# Patient Record
Sex: FEMALE | Race: WHITE | Employment: FULL TIME | URBAN - METROPOLITAN AREA
[De-identification: names, ages, dates, MRNs, and addresses within clinical notes are randomized per-mention and may not be internally consistent; named-entity substitution may affect disease eponyms.]

---

## 2017-01-03 ENCOUNTER — OFFICE VISIT (OUTPATIENT)
Dept: FAMILY MEDICINE | Facility: CLINIC | Age: 52
End: 2017-01-03
Payer: COMMERCIAL

## 2017-01-03 VITALS
TEMPERATURE: 97.9 F | HEIGHT: 66 IN | BODY MASS INDEX: 35.68 KG/M2 | DIASTOLIC BLOOD PRESSURE: 86 MMHG | RESPIRATION RATE: 16 BRPM | HEART RATE: 68 BPM | SYSTOLIC BLOOD PRESSURE: 135 MMHG | WEIGHT: 222 LBS

## 2017-01-03 DIAGNOSIS — M54.50 ACUTE LEFT-SIDED LOW BACK PAIN WITHOUT SCIATICA: Primary | ICD-10-CM

## 2017-01-03 PROCEDURE — 99214 OFFICE O/P EST MOD 30 MIN: CPT | Performed by: FAMILY MEDICINE

## 2017-01-03 RX ORDER — METHYLPREDNISOLONE 4 MG
TABLET, DOSE PACK ORAL
Qty: 21 TABLET | Refills: 0 | Status: SHIPPED | OUTPATIENT
Start: 2017-01-03 | End: 2018-02-20

## 2017-01-03 RX ORDER — NABUMETONE 500 MG/1
500-1000 TABLET, FILM COATED ORAL 2 TIMES DAILY PRN
Qty: 30 TABLET | Refills: 1 | Status: SHIPPED | OUTPATIENT
Start: 2017-01-03 | End: 2018-02-20

## 2017-01-03 NOTE — NURSING NOTE
"Chief Complaint   Patient presents with     Back Pain     lower left back pain x1 week, fell on ice today and now radiates down left leg     Initial /86 mmHg  Pulse 68  Temp(Src) 97.9  F (36.6  C) (Oral)  Resp 16  Ht 5' 5.5\" (1.664 m)  Wt 222 lb (100.699 kg)  BMI 36.37 kg/m2  LMP 09/09/2010  Breastfeeding? No Estimated body mass index is 36.37 kg/(m^2) as calculated from the following:    Height as of this encounter: 5' 5.5\" (1.664 m).    Weight as of this encounter: 222 lb (100.699 kg)..  BP completed using cuff size large.            Venessa Quigley/MARCIA    "

## 2017-01-03 NOTE — PROGRESS NOTES
SUBJECTIVE:                                                    Alison Barfield is a 51 year old female who presents to clinic today for the following health issues:      Back Pain      Duration: x1 week           Description:   Location of pain: low back left,  hip left  Character of pain: sharp  Pain radiation:radiates into the left knee cap after this morning's fall  New numbness or weakness in legs, not attributed to pain:  no     Intensity: Currently 7/10, moderate    History:   Pain interferes with job: yes  History of back problems: no prior back problems  Any previous MRI or X-rays: None  Sees a specialist for back pain:  No  Therapies tried without relief: acetaminophen (Tylenol), chiropractor, cold and heat    Alleviating factors:   Improved by: chiropractor      Precipitating factors:  Worsened by: Lifting, Bending, Standing, Sitting, Lying Flat and Walking    Functional and Psychosocial Screen (Trudy STarT Back):      Not performed today       Accompanying Signs & Symptoms:  Risk of Fracture:  None  Risk of Cauda Equina:  None  Risk of Infection:  None  Risk of Cancer:  None  Risk of Ankylosing Spondylitis:  Onset at age <35, male, AND morning back stiffness. no            Per patient pain initially started last Wednesday and was seen by Chiropractor on Friday for adjustment with helped some with her pain.   Unable to sleep because she can't roll on her side.   Slipped on ice this morning on her way to work which has made pain even worse.   Pain worse with being in one place for too long.   Has been using ice, ibuprofen/aleve, hot soaks without improvement.         Problem list and histories reviewed & adjusted, as indicated.  Additional history: as documented    Problem list, Medication list, Allergies, and Medical/Social/Surgical histories reviewed in EPIC and updated as appropriate.    ROS:  Constitutional, msk, neuro systems are negative, except as otherwise noted.    OBJECTIVE:                        "                             /86 mmHg  Pulse 68  Temp(Src) 97.9  F (36.6  C) (Oral)  Resp 16  Ht 5' 5.5\" (1.664 m)  Wt 222 lb (100.699 kg)  BMI 36.37 kg/m2  LMP 09/09/2010  Breastfeeding? No  Body mass index is 36.37 kg/(m^2).  GENERAL: healthy, alert and no distress  MS: back- no asymmetry, limited flexion and extension, left paralumbar muscles TTP, normal foot plantar flexion, negative pelvic compression   NEURO: sensory exam grossly normal, antalgic gait     Diagnostic Test Results:  none      ASSESSMENT/PLAN:                                                        1. Acute left-sided low back pain without sciatica  - will start on medrol dose pack to help with acute inflammation. Supportive care reviewed.   - methylPREDNISolone (MEDROL DOSEPAK) 4 MG tablet; Follow package instructions  Dispense: 21 tablet; Refill: 0  - tiZANidine (ZANAFLEX) 4 MG tablet; Take 1 tablet (4 mg) by mouth 3 times daily as needed for muscle spasms  Dispense: 60 tablet; Refill: 0  - nabumetone (RELAFEN) 500 MG tablet; Take 1-2 tablets (500-1,000 mg) by mouth 2 times daily as needed for moderate pain  Dispense: 30 tablet; Refill: 1  - [facility], [facility] CHIR[facility]RACT[facility] REFERRAL    See Patient Instructions    Iza Quinn MD  [facility]    "

## 2017-01-03 NOTE — PATIENT INSTRUCTIONS
Follow up in 2 weeks or sooner if needed  Back Contusion    You have a contusion to your back. A contusion is also called a bruise. There is swelling and some bleeding under the skin. The skin may be purplish. You may have muscle aching and stiffness in the area of the bruise. There are no broken bones.  Contusions heal on their own, without further treatment. However, pain and skin discoloration may take weeks to months to go away.   Home care    Rest. Avoid heavy lifting, strenuous exertion, or any activity that causes pain.    Ice the area to reduce pain and swelling. Put ice cubes in a plastic bag or use a cold pack. (Wrap the cold source in a thin towel. Do not place it directly on your skin.) Ice the injured area for 20 minutes every 1-2 hours the first day. Continue with ice packs 3-4 times a day for the next two days, then as needed for the relief of pain and swelling.    Take any prescribed pain medication. If none was prescribed, take acetaminophen, ibuprofen, or naproxen to control pain.  Follow-up care  Follow up with your healthcare provider, or as directed. Call if you are not better in 1-2 weeks.  When to seek medical advice  Call your healthcare provider for any of the following:    New or worsening pain    Increased swelling around the bruise    Pain spreads to one or both legs    Weakness or numbness in one or both legs     Loss of bowel or bladder control    Numbness in the groin or genital area    Fever of 100.4 F (38 C) or higher, or as directed by your healthcare provider    8190-2604 [employer]. [address] [place] [address], [place] [address]. All rights reserved. This information is not intended as a substitute for professional medical care. Always follow your healthcare professional's instructions.        Exercises To Strengthen Your Lower Back  Strong lower-back and abdominal muscles work together to support your spine. The exercises below will help strengthen the muscles of the  lower back. It is important that you begin exercising slowly and increase levels gradually.  Always begin any exercise program with stretching. If you feel pain while doing any of these exercises, stop and talk to your doctor about a more specific exercise program that better suits your condition.   Low back stretch  The point of stretching is to make you more flexible and increase your range of motion. Stretch only as much as you are able. Stretch slowly. Do not push your stretch to the limit. If at any point you feel pain while stretching, this is your (temporary) limit.    Lie on your back with your knees bent and both feet on the ground.    Slowly raise your left knee to your chest as you flatten your lower back against the floor. Hold for 5 seconds.    Relax and repeat the exercise with your right knee.    Do 10 of these exercises for each leg.    Repeat hugging both knees to your chest at the same time.  Building lower back strength  Start your exercise routine with 10 to 30 minutes a day, 1 to 3 times a day.  Initial exercises  Lying on your back:    Ankle pumps: Move your foot up and down, towards your head, and then away. Repeat 10 times with each foot.    Heel slides: Slowly bend your knee, drawing the heel of your foot towards you. Then slide your heel/foot from you, straightening your knee. Do not lift your foot off the floor (this is not a leg lift).    Abdominal contraction: Bend your knees and put your hands on your stomach. Tighten your stomach muscles. Hold for 5 seconds, then relax. Repeat 10 times.    Straight leg raise: Bend one leg at the knee and keep the other leg straight. Tighten your stomach muscles. Slowly lift your straight leg 6 to 12 inches off the floor and hold for up to 5 seconds. Repeat 10 times on each side.  Standing:    Wall squats: Stand with your back against the wall. Move your feet about 12 inches away from the wall. Tighten your stomach muscles, and slowly bend your knees  until they are at about a 45 degree angle. Do not go down too far. Hold about 5 seconds. Then slowly return to your starting position. Repeat 10 times.    Heel raises: Stand facing the wall. Slowly raise the heels of your feet up and down, while keeping your toes on the floor. If you have trouble balancing, you can touch the wall with your hands. Repeat 10 times.  More advanced exercises  When you feel comfortable enough, try these exercises.    Kneeling lumbar extension: Begin on your hands and knees. At the same time, raise and straighten your right arm and left leg until they are parallel to the ground. Hold for 2 seconds and come back slowly to a starting position. Repeat with left arm and right leg, alternating 10 times.    Prone lumbar extension: Lie face down, arms extended overhead, palms on the floor. At the same time, raise your right arm and left leg as high as comfortably possible. Hold for 10 seconds and slowly return to start. Repeat with left arm and right leg, alternating 10 times. Gradually build up to 20 times. (Advanced: Repeat this exercise raising both arms and both legs a few inches off the floor at the same time. Hold for 5 seconds and release.)    Pelvic tilt: Lie on the floor on your back with your knees bent at 90 degrees. Your feet should be flat on the floor. Inhale, exhale, then slowly contract your abdominal muscles bringing your navel toward your spine. Let your pelvis rock back until your lower back is flat on the floor. Hold for 10 seconds while breathing smoothly.    Abdominal crunch: Perform a pelvic tilt (above) flattening your lower back against the floor. Holding the tension in your abdominal muscles, take another breath and raise your shoulder blades off the ground (this is not a full sit-up). Keep your head in line with your body (don t bend your neck forward). Hold for 2 seconds, then slowly lower.    5560-2357 [employer]. [address] [place] [address], [place]  [address]. All rights reserved. This information is not intended as a substitute for professional medical care. Always follow your healthcare professional's instructions.        Back Care Tips    Caring for your back  These are things you can do to prevent a recurrence of acute back pain and to reduce symptoms from chronic back pain:    Maintain a healthy weight. If you are overweight, losing weight will help most types of back pain.    Exercise is an important part of recovery from most types of back pain. The back is supported by the muscles behind and in front of the spine. This means both the back muscles and the abdominal muscles must be strengthened to provide better support for your spine.     Swimming and brisk walking are good overall exercises to improve your fitness level.    Practice safe lifting methods (below).    Practice good posture when sitting, standing and walking. Avoid prolonged sitting. This puts more stress on the lower back than standing or walking.    Wear quality shoes with sufficient arch support. Foot and ankle alignment can affect back symptoms. Women should avoid high heels.    Therapeutic massage can help  relax the back muscles without stretching them.    During the first 24 to 72 hours after an acute injury or flare-up of chronic back pain, apply an ice pack to the painful area for 20 minutes and then remove it for 20 minutes over a period of 60 to 90 minutes or several times a day. As a safety precaution, do not use a heating pad at bedtime. Sleeping on a heating pad can lead to skin burns or tissue damage.    Ice and heat therapies can be alternated.  Medications  Talk to your doctor before using medications, especially if you have other medical problems or are taking other medicines.    You may use acetaminophen or ibuprofen to control pain, unless other pain medicine was prescribed. If you have chronic conditions like diabetes, liver or kidney disease, stomach ulcers or  gastrointestinal bleeding, or are taking blood thinners, talk with your doctor before taking any meidcations.    Be careful if you are given prescription pain medicines, narcotics, or medication for muscle spasm. They can cause drowsiness, affect your coordination, reflexes and judgment. Do not drive or operate heavy machinery.  Lumbar stretch  Here is a simple stretching exercise that will help relax muscle spasm and keep your back more limber. If exercise makes your back pain worse, don t do it.    Lie on your back with your knees bent and both feet on the ground.    Slowly raise your left knee to your chest as you flatten your lower back against the floor. Hold for 5 seconds.    Relax and repeat the exercise with your right knee.    Do 10 of these exercises for each leg.  Safe lifting method    Don t bend over at the waist to lift an object off the floor.  Instead, bend your knees and hips in a squat.     Keep your back and head upright    Hold the object close to your body, directly in front of you.    Straighten your legs to lift the object.     Lower the object to the floor in the reverse fashion.    If you must slide something across the floor, push it.  Posture tips  Sitting  Sit in chairs with straight backs or low-back support. rKeep your k nees lower than your hip, with your feet flat on the floor.  When driving, sit up straight. Adjust the seat forward so you are not leaning toward the steering wheel.  A small pillow or rolled towel behind your lower back may help if you are driving long distances.   Standing  When standing for long periods, shift most of your weight to one leg at a time. Alternate legs every few minutes.   Sleeping  The best way to sleep is on your side with your knees bent. Put a low pillow under your head to support your neck in a neutral spine position. Avoid thick pillows that bend your neck to one side. Put a pillow between your legs to further relax your lower back. If you sleep  on your back, put pillows under your knees to support your legs in a slightly flexed position. Use a firm mattress. If your mattress sags, replace it, or use a 1/2-inch plywood board under the mattress to add support.  Follow-up care  Follow up with your health care provider or as directed by our staff.  If X-rays, a CT scan or an MRI scan were taken, they will be reviewed by a radiologist. You will be notified of any new findings that may affect your care.  Call 911  Seek emergency medical care if any of the following occur:    Trouble breathing    Confusion    Very drowsy or trouble breathing    Fainting or loss of consciousness    Rapid or very slow heart rate    Loss of  bwel or bladder control  When to seek medical care  Call your health care provider if any of the following occur:    Pain becomes worse or spreads to your arms or legs    Weakness or numbness in one or both arms or legs    Numbness in the groin area    8011-4612 [employer]. [address] [place] [address], [place] [address]. All rights reserved. This information is not intended as a substitute for professional medical care. Always follow your healthcare professional's instructions.

## 2017-01-03 NOTE — MR AVS SNAPSHOT
After Visit Summary   1/3/2017    Alison Barfield    MRN: [id number]           Patient Information     Date Of Birth          1965        Visit Information        Provider Department      1/3/2017 3:45 PM Iza Quinn MD [facility]        Today's Diagnoses     Acute left-sided low back pain without sciatica    -  1       Care Instructions      Follow up in 2 weeks or sooner if needed  Back Contusion    You have a contusion to your back. A contusion is also called a bruise. There is swelling and some bleeding under the skin. The skin may be purplish. You may have muscle aching and stiffness in the area of the bruise. There are no broken bones.  Contusions heal on their own, without further treatment. However, pain and skin discoloration may take weeks to months to go away.   Home care    Rest. Avoid heavy lifting, strenuous exertion, or any activity that causes pain.    Ice the area to reduce pain and swelling. Put ice cubes in a plastic bag or use a cold pack. (Wrap the cold source in a thin towel. Do not place it directly on your skin.) Ice the injured area for 20 minutes every 1-2 hours the first day. Continue with ice packs 3-4 times a day for the next two days, then as needed for the relief of pain and swelling.    Take any prescribed pain medication. If none was prescribed, take acetaminophen, ibuprofen, or naproxen to control pain.  Follow-up care  Follow up with your healthcare provider, or as directed. Call if you are not better in 1-2 weeks.  When to seek medical advice  Call your healthcare provider for any of the following:    New or worsening pain    Increased swelling around the bruise    Pain spreads to one or both legs    Weakness or numbness in one or both legs     Loss of bowel or bladder control    Numbness in the groin or genital area    Fever of 100.4 F (38 C) or higher, or as directed by your healthcare provider    3751-6626 [employer]. [address] [place] [address], [place] [address]. All rights reserved. This information is not intended as a substitute for professional medical care. Always follow your healthcare professional's instructions.        Exercises To Strengthen Your Lower Back  Strong lower-back and abdominal muscles work together to support your spine. The exercises below will help strengthen the muscles of the lower back. It is important that you begin exercising slowly and increase levels gradually.  Always begin any exercise program with stretching. If you feel pain while doing any of these exercises, stop and talk to your doctor about a more specific exercise program that better suits your condition.   Low back stretch  The point of stretching is to make you more flexible and increase your range of motion. Stretch only as much as you are able. Stretch slowly. Do not push your stretch to the limit. If at any point you feel pain while stretching, this is your (temporary) limit.    Lie on your back with your knees bent and both feet on the ground.    Slowly raise your left knee to your chest as you flatten your lower back against the floor. Hold for 5 seconds.    Relax and repeat the exercise with your right knee.    Do 10 of these exercises for each leg.    Repeat hugging both knees to your chest at the same time.  Building lower back strength  Start your exercise routine with 10 to 30 minutes a day, 1 to 3 times a day.  Initial exercises  Lying on your back:    Ankle pumps: Move your foot up and down, towards your head, and then away. Repeat 10 times with each foot.    Heel slides: Slowly bend your knee, drawing the heel of your foot towards you. Then slide your heel/foot from you, straightening your knee. Do not lift your foot off the floor (this is not a leg lift).    Abdominal contraction: Bend your knees and put your hands on your stomach. Tighten your stomach muscles. Hold for 5 seconds, then relax. Repeat 10  times.    Straight leg raise: Bend one leg at the knee and keep the other leg straight. Tighten your stomach muscles. Slowly lift your straight leg 6 to 12 inches off the floor and hold for up to 5 seconds. Repeat 10 times on each side.  Standing:    Wall squats: Stand with your back against the wall. Move your feet about 12 inches away from the wall. Tighten your stomach muscles, and slowly bend your knees until they are at about a 45 degree angle. Do not go down too far. Hold about 5 seconds. Then slowly return to your starting position. Repeat 10 times.    Heel raises: Stand facing the wall. Slowly raise the heels of your feet up and down, while keeping your toes on the floor. If you have trouble balancing, you can touch the wall with your hands. Repeat 10 times.  More advanced exercises  When you feel comfortable enough, try these exercises.    Kneeling lumbar extension: Begin on your hands and knees. At the same time, raise and straighten your right arm and left leg until they are parallel to the ground. Hold for 2 seconds and come back slowly to a starting position. Repeat with left arm and right leg, alternating 10 times.    Prone lumbar extension: Lie face down, arms extended overhead, palms on the floor. At the same time, raise your right arm and left leg as high as comfortably possible. Hold for 10 seconds and slowly return to start. Repeat with left arm and right leg, alternating 10 times. Gradually build up to 20 times. (Advanced: Repeat this exercise raising both arms and both legs a few inches off the floor at the same time. Hold for 5 seconds and release.)    Pelvic tilt: Lie on the floor on your back with your knees bent at 90 degrees. Your feet should be flat on the floor. Inhale, exhale, then slowly contract your abdominal muscles bringing your navel toward your spine. Let your pelvis rock back until your lower back is flat on the floor. Hold for 10 seconds while breathing smoothly.    Abdominal  crunch: Perform a pelvic tilt (above) flattening your lower back against the floor. Holding the tension in your abdominal muscles, take another breath and raise your shoulder blades off the ground (this is not a full sit-up). Keep your head in line with your body (don t bend your neck forward). Hold for 2 seconds, then slowly lower.    3059-6483 [employer]. [address] [place] [address], [place] [address]. All rights reserved. This information is not intended as a substitute for professional medical care. Always follow your healthcare professional's instructions.        Back Care Tips    Caring for your back  These are things you can do to prevent a recurrence of acute back pain and to reduce symptoms from chronic back pain:    Maintain a healthy weight. If you are overweight, losing weight will help most types of back pain.    Exercise is an important part of recovery from most types of back pain. The back is supported by the muscles behind and in front of the spine. This means both the back muscles and the abdominal muscles must be strengthened to provide better support for your spine.     Swimming and brisk walking are good overall exercises to improve your fitness level.    Practice safe lifting methods (below).    Practice good posture when sitting, standing and walking. Avoid prolonged sitting. This puts more stress on the lower back than standing or walking.    Wear quality shoes with sufficient arch support. Foot and ankle alignment can affect back symptoms. Women should avoid high heels.    Therapeutic massage can help  relax the back muscles without stretching them.    During the first 24 to 72 hours after an acute injury or flare-up of chronic back pain, apply an ice pack to the painful area for 20 minutes and then remove it for 20 minutes over a period of 60 to 90 minutes or several times a day. As a safety precaution, do not use a heating pad at bedtime. Sleeping on a heating pad can lead to  skin burns or tissue damage.    Ice and heat therapies can be alternated.  Medications  Talk to your doctor before using medications, especially if you have other medical problems or are taking other medicines.    You may use acetaminophen or ibuprofen to control pain, unless other pain medicine was prescribed. If you have chronic conditions like diabetes, liver or kidney disease, stomach ulcers or gastrointestinal bleeding, or are taking blood thinners, talk with your doctor before taking any meidcations.    Be careful if you are given prescription pain medicines, narcotics, or medication for muscle spasm. They can cause drowsiness, affect your coordination, reflexes and judgment. Do not drive or operate heavy machinery.  Lumbar stretch  Here is a simple stretching exercise that will help relax muscle spasm and keep your back more limber. If exercise makes your back pain worse, don t do it.    Lie on your back with your knees bent and both feet on the ground.    Slowly raise your left knee to your chest as you flatten your lower back against the floor. Hold for 5 seconds.    Relax and repeat the exercise with your right knee.    Do 10 of these exercises for each leg.  Safe lifting method    Don t bend over at the waist to lift an object off the floor.  Instead, bend your knees and hips in a squat.     Keep your back and head upright    Hold the object close to your body, directly in front of you.    Straighten your legs to lift the object.     Lower the object to the floor in the reverse fashion.    If you must slide something across the floor, push it.  Posture tips  Sitting  Sit in chairs with straight backs or low-back support. rKeep your k nees lower than your hip, with your feet flat on the floor.  When driving, sit up straight. Adjust the seat forward so you are not leaning toward the steering wheel.  A small pillow or rolled towel behind your lower back may help if you are driving long  distances.   Standing  When standing for long periods, shift most of your weight to one leg at a time. Alternate legs every few minutes.   Sleeping  The best way to sleep is on your side with your knees bent. Put a low pillow under your head to support your neck in a neutral spine position. Avoid thick pillows that bend your neck to one side. Put a pillow between your legs to further relax your lower back. If you sleep on your back, put pillows under your knees to support your legs in a slightly flexed position. Use a firm mattress. If your mattress sags, replace it, or use a 1/2-inch plywood board under the mattress to add support.  Follow-up care  Follow up with your health care provider or as directed by our staff.  If X-rays, a CT scan or an MRI scan were taken, they will be reviewed by a radiologist. You will be notified of any new findings that may affect your care.  Call 911  Seek emergency medical care if any of the following occur:    Trouble breathing    Confusion    Very drowsy or trouble breathing    Fainting or loss of consciousness    Rapid or very slow heart rate    Loss of  bwel or bladder control  When to seek medical care  Call your health care provider if any of the following occur:    Pain becomes worse or spreads to your arms or legs    Weakness or numbness in one or both arms or legs    Numbness in the groin area    1366-3202 [employer]. [address] [place], [place] [address]. All rights reserved. This information is not intended as a substitute for professional medical care. Always follow your healthcare professional's instructions.              Follow-ups after your visit        Additional Services     [facility] PT, HAND, AND CHIROPRACTIC REFERRAL       **This order will print in the [facility] Scheduling Office**    Physical Therapy, Hand Therapy and Chiropractic Care are available through:    *[facility]  *[facility]  *[facility]    Call one number to schedule at any of the above locations: [phone].    Your provider has referred you to: Physical Therapy at [facility] or [facility]    Indication/Reason for Referral: Low Back Pain  Onset of Illness: a week ago and then fall today   Therapy Orders: Evaluate and Treat  Special Programs: None  Special Request: None    Trudy Yates      Additional Comments for the Therapist or Chiropractor:     Please be aware that coverage of these services is subject to the terms and limitations of your health insurance plan.  Call member services at your health plan with any benefit or coverage questions.      Please bring the following to your appointment:    *Your personal calendar for scheduling future appointments  *Comfortable clothing                  Who to contact     If you have questions or need follow up information about today's clinic visit or your schedule please contact [facility] directly at [phone].  Normal or non-critical lab and imaging results will be communicated to you by [employer], letter or phone within 4 business days after the clinic has received the results. If you do not hear from us within 7 days, please contact the clinic through [employer] or phone. If you have a critical or abnormal lab result, we will notify you by phone as soon as possible.  Submit refill requests through [employer] or call your pharmacy and they will forward the refill request to us. Please allow 3 business days for your refill to be completed.          Additional Information About Your Visit        [employer] Information     [employer] gives you secure access to your electronic health record. If you see a primary care provider, you can also send messages to your care team and make appointments. If you have questions, please call your primary care clinic.  If you do not have a primary care provider, please call [phone] and they will assist you.        Care EveryWhere ID     This is your Care  "EveryWhere ID. This could be used by other organizations to access your [facility] medical records  [id number]        Your Vitals Were     Pulse Temperature Respirations Height BMI (Body Mass Index) Last Period    68 97.9  F (36.6  C) (Oral) 16 5' 5.5\" (1.664 m) 36.37 kg/m2 09/09/2010    Breastfeeding?                   No            Blood Pressure from Last 3 Encounters:   01/03/17 135/86   11/18/16 114/66   11/07/16 114/78    Weight from Last 3 Encounters:   01/03/17 222 lb (100.699 kg)   11/18/16 221 lb (100.245 kg)   11/07/16 220 lb (99.791 kg)              We Performed the Following     [facility] PT, HAND, AND CH[facility]RACTIC REFERRAL          Today's Medication Changes          These changes are accurate as of: 1/3/17  4:28 PM.  If you have any questions, ask your nurse or doctor.               Start taking these medicines.        Dose/Directions    methylPREDNISolone 4 MG tablet   Commonly known as:  MEDROL DOSEPAK   Used for:  Acute left-sided low back pain without sciatica   Started by:  Iza Quinn MD        Follow package instructions   Quantity:  21 tablet   Refills:  0       nabumetone 500 MG tablet   Commonly known as:  RELAFEN   Used for:  Acute left-sided low back pain without sciatica   Started by:  Iza Quinn MD        Dose:  500-1000 mg   Take 1-2 tablets (500-1,000 mg) by mouth 2 times daily as needed for moderate pain   Quantity:  30 tablet   Refills:  1       tiZANidine 4 MG tablet   Commonly known as:  ZANAFLEX   Used for:  Acute left-sided low back pain without sciatica   Started by:  Iza Quinn MD        Dose:  4 mg   Take 1 tablet (4 mg) by mouth 3 times daily as needed for muscle spasms   Quantity:  60 tablet   Refills:  0            Where to get your medicines      These medications were sent to [facility] [address]  [address] [facility] [address]     Phone:  [phone]    - " methylPREDNISolone 4 MG tablet  - nabumetone 500 MG tablet  - tiZANidine 4 MG tablet             Primary Care Provider Office Phone # Fax #    Susan Rachele Haase, APRN [phone]       [facility] [address]  [facility] [address]        Thank you!     Thank you for choosing [facility]  for your care. Our goal is always to provide you with excellent care. Hearing back from our patients is one way we can continue to improve our services. Please take a few minutes to complete the written survey that you may receive in the mail after your visit with us. Thank you!             Your Updated Medication List - Protect others around you: Learn how to safely use, store and throw away your medicines at www.disposemymeds.org.          This list is accurate as of: 1/3/17  4:28 PM.  Always use your most recent med list.                   Brand Name Dispense Instructions for use    albuterol 108 (90 BASE) MCG/ACT Inhaler    PROAIR HFA/PROVENTIL HFA/VENTOLIN HFA    1 Inhaler    Inhale 2 puffs into the lungs every 4 hours as needed for shortness of breath / dyspnea or wheezing       atenolol 50 MG tablet    TENORMIN    90 tablet    Take 1 tablet (50 mg) by mouth daily       losartan-hydrochlorothiazide 100-25 MG per tablet    HYZAAR    90 tablet    Take 1 tablet by mouth daily       methylPREDNISolone 4 MG tablet    MEDROL DOSEPAK    21 tablet    Follow package instructions       nabumetone 500 MG tablet    RELAFEN    30 tablet    Take 1-2 tablets (500-1,000 mg) by mouth 2 times daily as needed for moderate pain       order for DME     1 each    Equipment being ordered: Blood pressure kit for home monitoring       SUMAtriptan 100 MG tablet    IMITREX    6 tablet    Take 1 tablet by mouth. May repeat in 2 hours if needed: max 2/day; average number of headaches monthly 1       tiZANidine 4 MG tablet    ZANAFLEX    60 tablet    Take 1 tablet (4 mg) by mouth 3 times daily as  needed for muscle spasms       TYLENOL PO      Take 1,000 mg by mouth as needed.

## 2017-01-25 DIAGNOSIS — Z20.828 EXPOSURE TO INFLUENZA: Primary | ICD-10-CM

## 2017-01-25 RX ORDER — OSELTAMIVIR PHOSPHATE 75 MG/1
75 CAPSULE ORAL DAILY
Qty: 10 CAPSULE | Refills: 0 | Status: SHIPPED | OUTPATIENT
Start: 2017-01-25 | End: 2018-02-20

## 2017-03-07 ENCOUNTER — TELEPHONE (OUTPATIENT)
Dept: FAMILY MEDICINE | Facility: CLINIC | Age: 52
End: 2017-03-07

## 2017-03-07 NOTE — TELEPHONE ENCOUNTER
Panel Management Review      Patient has the following on her problem list:     Depression / Dysthymia review  PHQ-9 SCORE 1/11/2016 7/1/2016 11/18/2016   Total Score - - -   Total Score 1 2 2      Patient is due for:  None    Hypertension   Last three blood pressure readings:  BP Readings from Last 3 Encounters:   01/03/17 135/86   11/18/16 114/66   11/07/16 114/78     Blood pressure: Passed    HTN Guidelines:  Age 18-59 BP range:  Less than 140/90  Age 60-85 with Diabetes:  Less than 140/90  Age 60-85 without Diabetes:  less than 150/90      Composite cancer screening  Chart review shows that this patient is due/due soon for the following Colonoscopy  Summary:    Patient is due/failing the following:   COLONOSCOPY    Action needed:   Patient needs referral/order: colonoscopy    Type of outreach:    Sent [employer] message.    Questions for provider review:    None                                                                                                                                    Lilibeth Johnston [facility]       Chart routed to Care Team .

## 2017-03-07 NOTE — LETTER
[facility]  [address] [facility] [id number] [phone]  March 21, 2017    Alison Barfield  [address] [facility] [address]    Dear Alison,    I care about your health and have reviewed your health plan. I have reviewed your medical conditions, medication list, and lab results and am making recommendations based on this review, to better manage your health.    You are in particular need of attention regarding:  -Colon Cancer Screening    I am recommending that you:  :-schedule a COLONOSCOPY to look for colon cancer (due every 10 years or 5 years in higher risk situations.)   Colon cancer is now the second leading cause of death in the [place] for both men and women and there are over 130,000 new cases and 50,000 deaths per year from colon cancer.  Colonoscopies can prevent 90-95% of these deaths.  Problem lesions can be removed before they ever become cancer.  This test is not only looking for cancer, but also getting rid of precancerious lesions.  If you do not wish to do a colonoscopy or cannot afford to do one, at this time, there is another option. It is called a FIT test or Fecal Immunochemical Occult Blood Test (take home stool sample kit).  It does not replace the colonoscopy for colorectal cancer screening, but it can detect hidden bleeding in the lower colon.  It does need to be repeated every year and if a positive result is obtained, you would be referred for a colonoscopy.  If you have completed either one of these tests at another facility, please have the records sent to our clinic so that we can best coordinate your care.    Here is a list of Health Maintenance topics that are due now or due soon:  Health Maintenance Due   Topic Date Due     HEPATITIS C SCREENING  09/30/1983     COLON CANCER SCREEN (SYSTEM ASSIGNED)  09/30/2015       Please call us at [phone] (or use [employer]) to address the above recommendations.      Thank you for trusting [facility] and we appreciate the opportunity to serve you.  We look forward to supporting your healthcare needs in the future.    Healthy Regards,    Susan Haase CNP

## 2017-04-01 ENCOUNTER — TELEPHONE (OUTPATIENT)
Dept: FAMILY MEDICINE | Facility: CLINIC | Age: 52
End: 2017-04-01

## 2017-04-01 NOTE — TELEPHONE ENCOUNTER
Pt calling her granddaughter testing positive for influenza A    She did take care of granddaughter this past wednesday.     Pt c/o of sore throat, body aches, and fever.      Advised as was treated in Jan prophylactic with Tamiflu would recommend testing this time to see if positive for influenza.      Pt to be seen at [facility]-  Will don mask when enters clinic.     Pt expressed understanding and acceptance of the plan.  Pt had no further questions at this time.  Advised can call back to clinic at any time with concerns.       Lilliam Schwarz RN

## 2017-04-03 DIAGNOSIS — I10 ESSENTIAL HYPERTENSION, BENIGN: ICD-10-CM

## 2017-04-03 DIAGNOSIS — J10.1 INFLUENZA B: Primary | ICD-10-CM

## 2017-04-03 RX ORDER — OSELTAMIVIR PHOSPHATE 75 MG/1
75 CAPSULE ORAL DAILY
Qty: 10 CAPSULE | Refills: 0 | Status: CANCELLED | OUTPATIENT
Start: 2017-04-03

## 2017-04-03 RX ORDER — OSELTAMIVIR PHOSPHATE 75 MG/1
75 CAPSULE ORAL 2 TIMES DAILY
Qty: 10 CAPSULE | Refills: 0 | Status: SHIPPED | OUTPATIENT
Start: 2017-04-03 | End: 2018-02-20

## 2017-04-03 NOTE — TELEPHONE ENCOUNTER
atenolol (TENORMIN) 50 MG tablet    Last Written Prescription Date: 06/10/16  Last Fill Quantity: 90, # refills: 2    Last Office Visit with FMJUDI, YEMI or [facility] prescribing provider:  01/03/17 with BAIRON and 11/18/16 with Soila Stahl Office Visit:        BP Readings from Last 3 Encounters:   01/03/17 135/86   11/18/16 114/66   11/07/16 114/78

## 2017-04-03 NOTE — TELEPHONE ENCOUNTER
Can you let Alison know that a prescription for Tamiflu was sent in, will prescribe the the dosage to treat.  Thanks,  Susan Haase, CNP

## 2017-04-03 NOTE — TELEPHONE ENCOUNTER
"Pt calls,  and grandchild has influenza B,  went to ER yesterday and diagnosed with influenza, told to call pcp for tamiflu, pt does have \"cold sxs-nasal congestion\" x 1 days, denies fever, cough or body aches, (t'd up both, select one) routed to , please advise, route to inform pt when sent on cell, may LM    Telephone Information:   Mobile [phone]     Jenae Silva RN, BSN  Message handled by Nurse Triage.    "

## 2017-04-04 RX ORDER — ATENOLOL 50 MG/1
50 TABLET ORAL DAILY
Qty: 90 TABLET | Refills: 1 | Status: SHIPPED | OUTPATIENT
Start: 2017-04-04 | End: 2017-07-31

## 2017-06-13 ENCOUNTER — TELEPHONE (OUTPATIENT)
Dept: FAMILY MEDICINE | Facility: CLINIC | Age: 52
End: 2017-06-13

## 2017-06-13 DIAGNOSIS — R91.8 PULMONARY NODULES: Primary | ICD-10-CM

## 2017-06-13 NOTE — TELEPHONE ENCOUNTER
Can you please call Alison and let her know it is time to have her CT of her chest repeated, the one in 1/2016 was abnormal and suggested follow up in 1 year to ensure the area is stable.  An order has been placed, please ask her to call radiology (give her the #) to set this up.  Thanks,  Susan Haase, CNP

## 2017-06-29 DIAGNOSIS — I10 ESSENTIAL HYPERTENSION, BENIGN: ICD-10-CM

## 2017-06-29 RX ORDER — LOSARTAN POTASSIUM AND HYDROCHLOROTHIAZIDE 25; 100 MG/1; MG/1
1 TABLET ORAL DAILY
Qty: 90 TABLET | Refills: 0 | Status: SHIPPED | OUTPATIENT
Start: 2017-06-29 | End: 2017-09-28

## 2017-06-29 NOTE — TELEPHONE ENCOUNTER
Losartan 100/25mg     Last Written Prescription Date: 06/03/2016  Last Fill Quantity: 90,04/02/2017 # refills: 3  Last Office Visit with G, P or [facility] prescribing provider: 01/03/2017-Dr Deysi Villagran       Potassium   Date Value Ref Range Status   11/18/2016 3.4 3.4 - 5.3 mmol/L Final     Creatinine   Date Value Ref Range Status   11/18/2016 0.65 0.52 - 1.04 mg/dL Final     BP Readings from Last 3 Encounters:   01/03/17 135/86   11/18/16 114/66   11/07/16 114/78

## 2017-06-29 NOTE — TELEPHONE ENCOUNTER
Prescription approved per [facility] Refill Protocol.  Jenae Silva RN, BSN

## 2017-07-31 ENCOUNTER — TELEPHONE (OUTPATIENT)
Dept: NURSING | Facility: CLINIC | Age: 52
End: 2017-07-31

## 2017-07-31 DIAGNOSIS — I10 ESSENTIAL HYPERTENSION WITH GOAL BLOOD PRESSURE LESS THAN 140/90: Primary | ICD-10-CM

## 2017-07-31 RX ORDER — METOPROLOL SUCCINATE 25 MG/1
25 TABLET, EXTENDED RELEASE ORAL DAILY
Qty: 90 TABLET | Refills: 1 | Status: SHIPPED | OUTPATIENT
Start: 2017-07-31 | End: 2018-02-12

## 2017-07-31 NOTE — TELEPHONE ENCOUNTER
Fax from [facility], ATENOLOL ON BACK ORDER, requesting rx change to metoprolol or another alternative, LM for pt to call, fax says last refill 7/2 for #90, back order temporary, confirm that pt needs rx replacement or if has supply    Jenae Silva RN, BSN  Message handled by Nurse Triage.

## 2017-07-31 NOTE — TELEPHONE ENCOUNTER
The last 3 times I went to pharmacy I have had to fight to get #30.   They would not dispense #90.   Has 4 days atenolol left on hand.   We checked with our pharmacy, they are out now too.   Please advise alternate for atenolol 50 mg one tablet daily?   We will call pt back with Soila's recommendation.   Gustabo Machado RN

## 2017-08-01 NOTE — TELEPHONE ENCOUNTER
left message informing below, call for certified letter returned, MARCIA Abdalla sent letter on 6/20/17, inform pt of CT scan f/u recommendations, routed to Lilibeth Stanley CMA        12:12 PM   Note      Certified letter sent to pt with scheduling information to inform pt         Melody 15, 2017     Jenae Silva RN

## 2017-08-01 NOTE — TELEPHONE ENCOUNTER
1. Pt informed and agrees to metoprolol as alternate for back ordered atenolol.   2. Pt informed and call transferred to  radiology scheduling  line for CT chest - this is follow up to 1/29/16 CT.    Message handled by Nurse Triage.  Gustabo Machado RN

## 2017-08-04 ENCOUNTER — HOSPITAL ENCOUNTER (OUTPATIENT)
Dept: CT IMAGING | Facility: CLINIC | Age: 52
Discharge: HOME | End: 2017-08-04
Attending: NURSE PRACTITIONER | Admitting: NURSE PRACTITIONER
Payer: COMMERCIAL

## 2017-08-04 DIAGNOSIS — R91.8 PULMONARY NODULES: ICD-10-CM

## 2017-08-04 PROCEDURE — 71250 CT THORAX DX C-: CPT

## 2017-08-05 DIAGNOSIS — R91.8 PULMONARY NODULES: Primary | ICD-10-CM

## 2017-08-06 NOTE — PROGRESS NOTES
Candelario Shane,  Your CT showed the following:    The two indeterminate right lung nodules are stable. No new or  enlarging lung lesions are appreciated. Lungs are otherwise clear.  additional 6 month interval follow-up to document one year of  stability.  I will place the order for you to have another CT in 6 months (2/2018).  Sincerely,     Susan Haase, CNP

## 2017-09-28 DIAGNOSIS — I10 ESSENTIAL HYPERTENSION, BENIGN: ICD-10-CM

## 2017-09-29 RX ORDER — LOSARTAN POTASSIUM AND HYDROCHLOROTHIAZIDE 25; 100 MG/1; MG/1
TABLET ORAL
Qty: 90 TABLET | Refills: 0 | Status: SHIPPED | OUTPATIENT
Start: 2017-09-29 | End: 2018-02-12

## 2017-09-29 NOTE — TELEPHONE ENCOUNTER
LOSARTAN/HCTZ 100/25MG TABLETS        Last Written Prescription Date: 06/29/17  Last Fill Quantity: 90, # refills: 0  Last Office Visit with G, P or [facility] prescribing provider: 11/18/16 Susan Haase       Potassium   Date Value Ref Range Status   11/18/2016 3.4 3.4 - 5.3 mmol/L Final     Creatinine   Date Value Ref Range Status   11/18/2016 0.65 0.52 - 1.04 mg/dL Final     BP Readings from Last 3 Encounters:   01/03/17 135/86   11/18/16 114/66   11/07/16 114/78

## 2017-09-29 NOTE — TELEPHONE ENCOUNTER
Medication is being filled for 1 time refill only due to:  Patient needs to be seen because due for annual physical with labs.     Prescription approved per [facility] Refill Protocol.    Wilma COLLIER RN, BSN, PHN  [place] Flex RN

## 2017-12-14 ENCOUNTER — TELEPHONE (OUTPATIENT)
Dept: FAMILY MEDICINE | Facility: CLINIC | Age: 52
End: 2017-12-14

## 2017-12-14 NOTE — TELEPHONE ENCOUNTER
Panel Management Review      Patient has the following on her problem list:     Depression / Dysthymia review    Measure:  Needs PHQ-9 score of 4 or less during index window.  Administer PHQ-9 and if score is 5 or more, send encounter to provider for next steps.    5 - 7 month window range:     PHQ-9 SCORE 1/11/2016 7/1/2016 11/18/2016   Total Score - - -   Total Score 1 2 2       If PHQ-9 recheck is 5 or more, route to provider for next steps.    Patient is due for:  PHQ9        Composite cancer screening  Chart review shows that this patient is due/due soon for the following Mammogram and Colonoscopy  Summary:    Patient is due/failing the following:   COLONOSCOPY and MAMMOGRAM    Action needed:   mammo and colonoscopy    Type of outreach:    Phone, left message for patient to call back.     Questions for provider review:    None                                                                                                                                    Venessa Quigley/MARCIA  [place]---[facility]       Chart routed to care team .

## 2018-02-12 DIAGNOSIS — I10 ESSENTIAL HYPERTENSION WITH GOAL BLOOD PRESSURE LESS THAN 140/90: ICD-10-CM

## 2018-02-12 DIAGNOSIS — I10 ESSENTIAL HYPERTENSION, BENIGN: ICD-10-CM

## 2018-02-12 NOTE — LETTER
[facility]  [address] [address] [id number] [phone]          February 15, 2018    Alison Barfield                                                                                                                     [address] [address] [address]            Dear Alison,    We recently received a call from your pharmacy requesting a refill of your medications.    A review of your chart indicates that an appointment is required.  Please contact our office at [phone] to schedule your ANNUAL FASTING doctor's appointment.    We have authorized one refill of your medication to allow time for you to schedule your appointment.    Taking care of your health is important to us and ongoing visits with your provider are vital to your care.  We look forward to seeing you in the near future.    Sincerely,    Susan Haase, CNP/Jenae GUDINO

## 2018-02-13 NOTE — TELEPHONE ENCOUNTER
"Requested Prescriptions   Pending Prescriptions Disp Refills     metoprolol succinate (TOPROL-XL) 25 MG 24 hr tablet [Pharmacy Med Name: METOPROLOL ER SUCCINATE 25MG TABS]  Last Written Prescription Date:  7/31/2017  Last Fill Quantity: 90 tablet,  # refills: 1   Last office visit: 1/3/2017 with prescribing provider:  Kai    90 tablet 0     Sig: TAKE 1 TABLET(25 MG) BY MOUTH DAILY    Beta-Blockers Protocol Failed    2/12/2018  6:41 PM       Failed - Blood pressure under 140/90    BP Readings from Last 3 Encounters:   01/03/17 135/86   11/18/16 114/66   11/07/16 114/78            Failed - Recent or future visit with authorizing provider's specialty    Patient had office visit in the last year or has a visit in the next 30 days with authorizing provider.  See \"Patient Info\" tab in inbasket, or \"Choose Columns\" in Meds & Orders section of the refill encounter.          Passed - Patient is age 6 or older   ________________________________________________________________________________________________________________________________________________________     losartan-hydrochlorothiazide (HYZAAR) 100-25 MG per tablet [Pharmacy Med Name: LOSARTAN/HCTZ 100/25MG TABLETS]  Last Written Prescription Date: 9/29/2017  Last Fill Quantity: 90 tablet,  # refills: 0   Last office visit: 1/3/2017 with prescribing provider:  Kai    90 tablet 0     Sig: TAKE 1 TABLET BY MOUTH DAILY    Angiotensin-II Receptors Failed    2/12/2018  6:41 PM       Failed - Blood pressure under 140/90 in past 12 months.    BP Readings from Last 3 Encounters:   01/03/17 135/86   11/18/16 114/66   11/07/16 114/78          Failed - Recent or future visit with authorizing provider's specialty    Patient had office visit in the last year or has a visit in the next 30 days with authorizing provider.  See \"Patient Info\" tab in inbasket, or \"Choose Columns\" in Meds & Orders section of the refill encounter.          Failed - Normal serum " creatinine on file in past 12 months    Recent Labs   Lab Test  11/18/16   1402   CR  0.65            Failed - Normal serum potassium on file in past 12 months    Recent Labs   Lab Test  11/18/16   1402   POTASSIUM  3.4          Passed - Patient is age 18 or older       Passed - No active pregnancy on record       Passed - No positive pregnancy test in past 12 months

## 2018-02-15 RX ORDER — METOPROLOL SUCCINATE 25 MG/1
TABLET, EXTENDED RELEASE ORAL
Qty: 30 TABLET | Refills: 0 | Status: SHIPPED | OUTPATIENT
Start: 2018-02-15 | End: 2018-05-02

## 2018-02-15 RX ORDER — LOSARTAN POTASSIUM AND HYDROCHLOROTHIAZIDE 25; 100 MG/1; MG/1
TABLET ORAL
Qty: 30 TABLET | Refills: 0 | Status: SHIPPED | OUTPATIENT
Start: 2018-02-15 | End: 2018-05-02

## 2018-02-15 NOTE — TELEPHONE ENCOUNTER
Needs visit, appointment letter sent, one month extended  Prescription approved per [facility] Refill Protocol.  Jenae Silva RN, BSN

## 2018-02-20 ENCOUNTER — OFFICE VISIT (OUTPATIENT)
Dept: FAMILY MEDICINE | Facility: CLINIC | Age: 53
End: 2018-02-20
Payer: COMMERCIAL

## 2018-02-20 VITALS
RESPIRATION RATE: 16 BRPM | DIASTOLIC BLOOD PRESSURE: 90 MMHG | HEIGHT: 66 IN | SYSTOLIC BLOOD PRESSURE: 150 MMHG | BODY MASS INDEX: 35.84 KG/M2 | HEART RATE: 84 BPM | TEMPERATURE: 98.7 F | WEIGHT: 223 LBS

## 2018-02-20 DIAGNOSIS — D05.10 DUCTAL CARCINOMA IN SITU (DCIS) OF BREAST, UNSPECIFIED LATERALITY: ICD-10-CM

## 2018-02-20 DIAGNOSIS — M54.42 ACUTE RIGHT-SIDED LOW BACK PAIN WITH LEFT-SIDED SCIATICA: Primary | ICD-10-CM

## 2018-02-20 PROCEDURE — 99213 OFFICE O/P EST LOW 20 MIN: CPT | Performed by: PHYSICIAN ASSISTANT

## 2018-02-20 RX ORDER — NABUMETONE 500 MG/1
500-1000 TABLET, FILM COATED ORAL 2 TIMES DAILY PRN
Qty: 60 TABLET | Refills: 1 | Status: SHIPPED | OUTPATIENT
Start: 2018-02-20 | End: 2018-06-06

## 2018-02-20 RX ORDER — CYCLOBENZAPRINE HCL 10 MG
5-10 TABLET ORAL 3 TIMES DAILY PRN
Qty: 30 TABLET | Refills: 1 | Status: SHIPPED | OUTPATIENT
Start: 2018-02-20 | End: 2018-06-06

## 2018-02-20 RX ORDER — METHYLPREDNISOLONE 4 MG
TABLET, DOSE PACK ORAL
Qty: 21 TABLET | Refills: 0 | Status: SHIPPED | OUTPATIENT
Start: 2018-02-20 | End: 2018-07-02

## 2018-02-20 NOTE — MR AVS SNAPSHOT
After Visit Summary   2/20/2018    Alison Barfield    MRN: [id number]           Patient Information     Date Of Birth          1965        Visit Information        Provider Department      2/20/2018 2:15 PM Antwan Wood PA-C [facility]        Today's Diagnoses     Acute right-sided low back pain with left-sided sciatica    -  1    Ductal carcinoma in situ (DCIS) of breast, unspecified laterality          Care Instructions      Exercises to Strengthen Your Lower Back  Strong lower back and abdominal muscles work together to support your spine. The exercises below will help strengthen the lower back. It is important that you begin exercising slowly and increase levels gradually.  Always begin any exercise program with stretching. If you feel pain while doing any of these exercises, stop and talk to your doctor about a more specific exercise program that better suits your condition.   Low back stretch  The point of stretching is to make you more flexible and increase your range of motion. Stretch only as much as you are able. Stretch slowly. Do not push your stretch to the limit. If at any point you feel pain while stretching, this is your (temporary) limit.    Lie on your back with your knees bent and both feet on the ground.    Slowly raise your left knee to your chest as you flatten your lower back against the floor. Hold for 5 seconds.    Relax and repeat the exercise with your right knee.    Do 10 of these exercises for each leg.    Repeat hugging both knees to your chest at the same time.  Building lower back strength  Start your exercise routine with 10 to 30 minutes a day, 1 to 3 times a day.  Initial exercises  Lying on your back:  1. Ankle pumps: Move your foot up and down, towards your head, and then away. Repeat 10 times with each foot.  2. Heel slides: Slowly bend your knee, drawing the heel of your foot towards you. Then slide your heel/foot from you,  straightening your knee. Do not lift your foot off the floor (this is not a leg lift).  3. Abdominal contraction: Bend your knees and put your hands on your stomach. Tighten your stomach muscles. Hold for 5 seconds, then relax. Repeat 10 times.  4. Straight leg raise: Bend one leg at the knee and keep the other leg straight. Tighten your stomach muscles. Slowly lift your straight leg 6 to 12 inches off the floor and hold for up to 5 seconds. Repeat 10 times on each side.  Standin. Wall squats: Stand with your back against the wall. Move your feet about 12 inches away from the wall. Tighten your stomach muscles, and slowly bend your knees until they are at about a 45 degree angle. Do not go down too far. Hold about 5 seconds. Then slowly return to your starting position. Repeat 10 times.  2. Heel raises: Stand facing the wall. Slowly raise the heels of your feet up and down, while keeping your toes on the floor. If you have trouble balancing, you can touch the wall with your hands. Repeat 10 times.  More advanced exercises  When you feel comfortable enough, try these exercises.  1. Kneeling lumbar extension: Begin on your hands and knees. At the same time, raise and straighten your right arm and left leg until they are parallel to the ground. Hold for 2 seconds and come back slowly to a starting position. Repeat with left arm and right leg, alternating 10 times.  2. Prone lumbar extension: Lie face down, arms extended overhead, palms on the floor. At the same time, raise your right arm and left leg as high as comfortably possible. Hold for 10 seconds and slowly return to start. Repeat with left arm and right leg, alternating 10 times. Gradually build up to 20 times. (Advanced: Repeat this exercise raising both arms and both legs a few inches off the floor at the same time. Hold for 5 seconds and release.)  3. Pelvic tilt: Lie on the floor on your back with your knees bent at 90 degrees. Your feet should be flat  "on the floor. Inhale, exhale, then slowly contract your abdominal muscles bringing your navel toward your spine. Let your pelvis rock back until your lower back is flat on the floor. Hold for 10 seconds while breathing smoothly.  4. Abdominal crunch: Perform a pelvic tilt (above) flattening your lower back against the floor. Holding the tension in your abdominal muscles, take another breath and raise your shoulder blades off the ground (this is not a full sit-up). Keep your head in line with your body (don t bend your neck forward). Hold for 2 seconds, then slowly lower.  Date Last Reviewed: 6/1/2016 2000-2017 [employer] [address] [place] [address], [place] [address]. All rights reserved. This information is not intended as a substitute for professional medical care. Always follow your healthcare professional's instructions.          * Sciatica    Sciatica (\"Lumbar Radiculopathy\") causes a pain that spreads from the lower back down into the buttock, hip and leg. Sometimes leg pain can occur without any back pain. Sciatica is due to irritation or pressure on a spinal nerve as it comes out of the spinal canal. This is most often due to a bulge or rupture of a nearby spinal disk (the cartilage cushion between each spinal bone), which presses on a nearby nerve. Other causes include spinal stenosis (narrowing of the spinal canal) and spasm of the piriformis muscle (a muscle in the buttocks that the sciatic nerve passes through).  Sciatica may begin after a sudden twisting/bending force (such as in a car accident), or sometimes after a simple awkward movement. In either case, muscle spasm is commonly present and contributes to the pain.  The diagnosis of sciatica is made from the symptoms and physical exam. Unless you had a physical injury (such as a car accident or fall), X-rays are usually not ordered for the initial evaluation of sciatica because the nerves and disks cannot be seen on an X-ray. Most " sciatica (80-90%) gets better with time.  What can I do about my low back pain?  There are three main things you can do to ease low back pain and help it go away.    Use heat or cold packs.    Take medicine as directed.    Use positions, movements and exercises. Stay active! Too much rest can make your symptoms worse.  Using heat or cold packs  Try cold packs or gentle heat to ease your pain. Use whichever gives the most relief. Apply the cold pack or heat for 15 minutes at a time, as often as needed.  Taking medicine  If taking over-the-counter medicine:    Take ibuprofen (Advil, Motrin) 600 mg. three times a day as needed for pain.  OR    Take Aleve (naproxen sodium) 220 to 440 mg. two times a day as needed for pain  If your doctor prescribed a muscle relaxant (cyclobenzapine 10 mg.):    Take one half ( ) to 1 tablet at bedtime    Do not drive when taking this medicine. This drug may make you sleepy.  Using positions, movements and exercises  Research tells us that moving your joints and muscles can help you recover from back pain. Such activity should be simple and gentle.  Use the positions below as well as walking to help relieve your discomfort. Try taking a short walk every 3 to 4 hours during the day. Walk for a few minutes inside your home or take longer walks outside, on a treadmill or at a mall. Slowly increase the amount of time you walk. Expect discomfort when you begin, but it should lessen as your back starts to recover.  Finding a position that is comfortable  When your back pain is new, you may find that certain positions will ease your pain. Gently try each of the following positions until you find one that eases your pain. Once you find a position of comfort, use it as often as you like while you recover. Return to your daily routine as soon as possible.     Lie on your back with your legs bent. You can do this by placing a pillow under your knees or lie on the floor and rest your lower legs on  the seat of a chair.    Lie on your side with your knees bent and place a pillow between your knees.    Lie on your stomach over pillows.  When should I call my doctor?  Your back pain should improve over the first couple of weeks. As it improves, you should be able to return to your normal activities. But call your doctor if:    You have a sudden change in your ability to control? your bladder or bowels.    You begin to feel tingling in your groin or legs.    The pain spreads down your leg and into your foot.    Your toes, feet or leg muscles begin to feel weak.    You feel generally unwell or sick.    Your pain gets worse.    8620-9362 [employer]. [address] [place] [address], [place] [address]. All rights reserved. This information is not intended as a substitute for professional medical care. Always follow your healthcare professional's instructions.  This information has been modified by your health care provider with permission from the publisher.                Follow-ups after your visit        Additional Services     ONC/HEME ADULT REFERRAL       Your provider has referred you to: [facility]: Cancer Care/Hematology (All Cancer Related Services) [place] [phone]   [email or web address]    Please be aware that coverage of these services is subject to the terms and limitations of your health insurance plan.  Call member services at your health plan with any benefit or coverage questions.      Please bring the following with you to your appointment:    (1) Any X-Rays, CTs or MRIs which have been performed.  Contact the facility where they were done to arrange for  prior to your scheduled appointment.   (2) List of current medications  (3) This referral request   (4) Any documents/labs given to you for this referral                  Who to contact     If you have questions or need follow up information about today's clinic visit or your schedule  "please contact [facility] directly at [phone].  Normal or non-critical lab and imaging results will be communicated to you by MyChart, letter or phone within 4 business days after the clinic has received the results. If you do not hear from us within 7 days, please contact the clinic through [employer] or phone. If you have a critical or abnormal lab result, we will notify you by phone as soon as possible.  Submit refill requests through [employer] or call your pharmacy and they will forward the refill request to us. Please allow 3 business days for your refill to be completed.          Additional Information About Your Visit        [employer] Information     [employer] gives you secure access to your electronic health record. If you see a primary care provider, you can also send messages to your care team and make appointments. If you have questions, please call your primary care clinic.  If you do not have a primary care provider, please call [phone] and they will assist you.        Care EveryWhere ID     This is your Care EveryWhere ID. This could be used by other organizations to access your [facility] medical records  [id number]        Your Vitals Were     Pulse Temperature Respirations Height Last Period BMI (Body Mass Index)    84 98.7  F (37.1  C) (Oral) 16 5' 5.5\" (1.664 m) 09/09/2010 36.54 kg/m2       Blood Pressure from Last 3 Encounters:   02/20/18 150/90   01/03/17 135/86   11/18/16 114/66    Weight from Last 3 Encounters:   02/20/18 223 lb (101.2 kg)   01/03/17 222 lb (100.7 kg)   11/18/16 221 lb (100.2 kg)              We Performed the Following     ONC/HEME ADULT REFERRAL          Today's Medication Changes          These changes are accurate as of 2/20/18  2:54 PM.  If you have any questions, ask your nurse or doctor.               Start taking these medicines.        Dose/Directions    cyclobenzaprine 10 MG tablet   Commonly known as:  FLEXERIL   Used for:  Acute right-sided low " back pain with left-sided sciatica   Started by:  Antwan Wood PA-C        Dose:  5-10 mg   Take 0.5-1 tablets (5-10 mg) by mouth 3 times daily as needed for muscle spasms   Quantity:  30 tablet   Refills:  1       nabumetone 500 MG tablet   Commonly known as:  RELAFEN   Used for:  Acute right-sided low back pain with left-sided sciatica   Started by:  Antwan Wood PA-C        Dose:  500-1000 mg   Take 1-2 tablets (500-1,000 mg) by mouth 2 times daily as needed for moderate pain   Quantity:  60 tablet   Refills:  1         Stop taking these medicines if you haven't already. Please contact your care team if you have questions.     oseltamivir 75 MG capsule   Commonly known as:  TAMIFLU   Stopped by:  Antwan Wood PA-C           tiZANidine 4 MG tablet   Commonly known as:  ZANAFLEX   Stopped by:  Antwan Wood PA-C                Where to get your medicines      These medications were sent to [facility] [id number] [place] [address]  [address] [facility] [address]     Phone:  [phone]     cyclobenzaprine 10 MG tablet    methylPREDNISolone 4 MG tablet    nabumetone 500 MG tablet                Primary Care Provider Office Phone # Fax #    Susan Rachele Haase, APRN -[phone] [address] [facility] [address]        Equal Access to Services     [facility] CenterHO[facility] : Hadii aad ku hadasho Soomaali, waaxda luqadaha, qaybta kaalmada adeegyada, darrian swanson haysangeeta mesa . So [facility] [phone].    ATENCIÓN: Si habla español, tiene a mehta disposición servicios gratuitos de asistencia lingüística. Siri al [phone].    We comply with applicable federal civil rights laws and [place] laws. We do not discriminate on the basis of race, color, national origin, age, disability, sex, sexual orientation, or gender identity.            Thank you!     Thank you for choosing [facility]  for your care.  Our goal is always to provide you with excellent care. Hearing back from our patients is one way we can continue to improve our services. Please take a few minutes to complete the written survey that you may receive in the mail after your visit with us. Thank you!             Your Updated Medication List - Protect others around you: Learn how to safely use, store and throw away your medicines at www.disposemymeds.org.          This list is accurate as of 2/20/18  2:54 PM.  Always use your most recent med list.                   Brand Name Dispense Instructions for use Diagnosis    albuterol 108 (90 BASE) MCG/ACT Inhaler    PROAIR HFA/PROVENTIL HFA/VENTOLIN HFA    1 Inhaler    Inhale 2 puffs into the lungs every 4 hours as needed for shortness of breath / dyspnea or wheezing    CAP (community acquired pneumonia)       cyclobenzaprine 10 MG tablet    FLEXERIL    30 tablet    Take 0.5-1 tablets (5-10 mg) by mouth 3 times daily as needed for muscle spasms    Acute right-sided low back pain with left-sided sciatica       losartan-hydrochlorothiazide 100-25 MG per tablet    HYZAAR    30 tablet    TAKE 1 TABLET BY MOUTH DAILY    Essential hypertension, benign       methylPREDNISolone 4 MG tablet    MEDROL DOSEPAK    21 tablet    Follow package instructions    Acute right-sided low back pain with left-sided sciatica       metoprolol succinate 25 MG 24 hr tablet    TOPROL-XL    30 tablet    TAKE 1 TABLET(25 MG) BY MOUTH DAILY    Essential hypertension with goal blood pressure less than 140/90       nabumetone 500 MG tablet    RELAFEN    60 tablet    Take 1-2 tablets (500-1,000 mg) by mouth 2 times daily as needed for moderate pain    Acute right-sided low back pain with left-sided sciatica       SUMAtriptan 100 MG tablet    IMITREX    6 tablet    Take 1 tablet by mouth. May repeat in 2 hours if needed: max 2/day; average number of headaches monthly 1    Cervicalgia, Migraine       TYLENOL PO      Take 1,000 mg by mouth as  needed.

## 2018-02-20 NOTE — PROGRESS NOTES
SUBJECTIVE:   Alison Barfield is a 52 year old female who presents to clinic today for the following health issues:      Back Pain       Duration: Saturday        Specific cause: slipped on ice    Description:   Location of pain: low back-worse on right  Character of pain: constant; cramping, shooting at times-legs will give out  Pain radiation:radiates right leg  New numbness or weakness in legs, not attributed to pain:  YES    Intensity: Currently 5-6/10, At its worst 8-9/10    History:   Pain interferes with job: YES  History of back problems: no prior back problems  Any previous MRI or X-rays: None  Sees a specialist for back pain:  No  Therapies tried without relief: aleve    Alleviating factors:   Improved by: ice      Precipitating factors:  Worsened by: bending, sitting,     Functional and Psychosocial Screen (Trudy STarT Back):      Not performed today      Accompanying Signs & Symptoms:  Risk of Fracture:  Recent history of trauma or blunt force  Risk of Cauda Equina:  None  Risk of Infection:  None  Risk of Cancer:  None  Risk of Ankylosing Spondylitis:  Onset at age <35, male, AND morning back stiffness. no      History of DCIS s/p mastectomy. She is not aware of her follow up plan. States dicussed with pcp in past about this and it was brought up establishing with [facility] oncology for further consultation. However does not believe anything was pursued with this.     Problem list and histories reviewed & adjusted, as indicated.  Additional history: as documented    Patient Active Problem List   Diagnosis     Malaise and fatigue     Mild major depression (H)     Breast cyst     High triglycerides     Ductal carcinoma in situ of breast     CARDIOVASCULAR SCREENING; LDL GOAL LESS THAN 130     Breast cancer (H)     Health Care Home     Elevated rheumatoid factor     Migraine     S/P abdominal supracervical subtotal hysterectomy     Pain of left thumb     Pulmonary nodules     Essential hypertension with  goal blood pressure less than 140/90     Past Surgical History:   Procedure Laterality Date     BREAST SURGERY      masectomy-double     C NONSPECIFIC PROCEDURE      Child birth x 3     EXCISE MASS NECK  2012    Procedure: EXCISE MASS NECK;  Excision of Left Deep Neck Cyst;  Surgeon: Elmer Hernandez MD;  Location: [facility] OR     HYSTERECTOMY, PAP NO LONGER INDICATED       kidney stone extraction       and      LAPAROSCOPIC ASSISTED HYSTERECTOMY VAGINAL, BILATERAL SALPINGO-OOPHORECTOMY, COMBINED      hysterectomy - 2010 - [facility]       Social History   Substance Use Topics     Smoking status: Former Smoker     Types: Cigarettes     Smokeless tobacco: Never Used      Comment: Quit in      Alcohol use No     Family History   Problem Relation Age of Onset     CANCER Father      lung CA     Other - See Comments Father       from blood clot     CANCER Brother      squamous cell of the jaw     Hypertension Maternal Grandmother      CEREBROVASCULAR DISEASE Maternal Grandmother          Current Outpatient Prescriptions   Medication Sig Dispense Refill     cyclobenzaprine (FLEXERIL) 10 MG tablet Take 0.5-1 tablets (5-10 mg) by mouth 3 times daily as needed for muscle spasms 30 tablet 1     nabumetone (RELAFEN) 500 MG tablet Take 1-2 tablets (500-1,000 mg) by mouth 2 times daily as needed for moderate pain 60 tablet 1     methylPREDNISolone (MEDROL DOSEPAK) 4 MG tablet Follow package instructions 21 tablet 0     metoprolol succinate (TOPROL-XL) 25 MG 24 hr tablet TAKE 1 TABLET(25 MG) BY MOUTH DAILY 30 tablet 0     losartan-hydrochlorothiazide (HYZAAR) 100-25 MG per tablet TAKE 1 TABLET BY MOUTH DAILY 30 tablet 0     albuterol (PROAIR HFA, PROVENTIL HFA, VENTOLIN HFA) 108 (90 BASE) MCG/ACT inhaler Inhale 2 puffs into the lungs every 4 hours as needed for shortness of breath / dyspnea or wheezing 1 Inhaler 1     SUMAtriptan (IMITREX) 100 MG tablet Take 1 tablet by mouth. May repeat in 2 hours  "if needed: max 2/day; average number of headaches monthly 1 6 tablet 0     Acetaminophen (TYLENOL PO) Take 1,000 mg by mouth as needed.       [DISCONTINUED] nabumetone (RELAFEN) 500 MG tablet Take 1-2 tablets (500-1,000 mg) by mouth 2 times daily as needed for moderate pain (Patient not taking: Reported on 2/20/2018) 30 tablet 1     Allergies   Allergen Reactions     Cefprozil      Cefzil rash and facial swelling     Erythromycin      GI upset     Sulfa Drugs      BP Readings from Last 3 Encounters:   02/20/18 150/90   01/03/17 135/86   11/18/16 114/66    Wt Readings from Last 3 Encounters:   02/20/18 223 lb (101.2 kg)   01/03/17 222 lb (100.7 kg)   11/18/16 221 lb (100.2 kg)                    Reviewed and updated as needed this visit by clinical staff  Tobacco  Allergies  Meds  Problems  Med Hx  Surg Hx  Fam Hx  Soc Hx        Reviewed and updated as needed this visit by Provider  Allergies  Meds  Problems         ROS:  Constitutional, msk, neuro, cardiovascular, pulmonary, gi and gu systems are negative, except as otherwise noted.    OBJECTIVE:     /90 (BP Location: Right arm, Patient Position: Chair, Cuff Size: Adult Large)  Pulse 84  Temp 98.7  F (37.1  C) (Oral)  Resp 16  Ht 5' 5.5\" (1.664 m)  Wt 223 lb (101.2 kg)  LMP 09/09/2010  BMI 36.54 kg/m2  Body mass index is 36.54 kg/(m^2).  GENERAL: alert, moderate distress and in pain  Comprehensive back pain exam:  Tenderness of right paralumbar musculature and sciatic notch. no spinal process ttp, Pain limits the following motions: flexion and bilateral rotation and bending. , Demonstrates weakness in right great toe extensors, indication possible L5 nerve involvement, Diminished paterllar reflex on  right side relative to contralateral side; possible L4 spinal nerve root involvement, Lower extremity sensation normal and equal on both sides and Straight leg positive on  right, indicating possible ipsilateral radiculopathy  PSYCH: mentation " appears normal, affect normal/bright    Diagnostic Test Results:  none     ASSESSMENT/PLAN:     (M54.42) Acute right-sided low back pain with left-sided sciatica  (primary encounter diagnosis)  Comment: evident on history and exam. No spinal tenderness to palpation making fracture less likely. However, evidence of radiculopathy on history and exam. Discussed etiology and management. Recommending medrol with prn nsaids and flexeril with scheduled ice. Home stretching given to initiate as symptoms improve. If no improvement in 1 week, will have see therapy. If worsening, will consider MRI.   Plan: cyclobenzaprine (FLEXERIL) 10 MG tablet,         nabumetone (RELAFEN) 500 MG tablet,         methylPREDNISolone (MEDROL DOSEPAK) 4 MG tablet        -Medication use and side effects discussed with the patient. Patient is in complete understanding and agreement with plan.       (D05.10) Ductal carcinoma in situ (DCIS) of breast, unspecified laterality  Comment: history of this. Stable, but follow up plan unclear. Will have see oncology for consultation.   Plan: ONC/HEME ADULT REFERRAL              Follow up: as above     Antwan Wood PA-C  [facility]

## 2018-02-20 NOTE — PATIENT INSTRUCTIONS
Exercises to Strengthen Your Lower Back  Strong lower back and abdominal muscles work together to support your spine. The exercises below will help strengthen the lower back. It is important that you begin exercising slowly and increase levels gradually.  Always begin any exercise program with stretching. If you feel pain while doing any of these exercises, stop and talk to your doctor about a more specific exercise program that better suits your condition.   Low back stretch  The point of stretching is to make you more flexible and increase your range of motion. Stretch only as much as you are able. Stretch slowly. Do not push your stretch to the limit. If at any point you feel pain while stretching, this is your (temporary) limit.    Lie on your back with your knees bent and both feet on the ground.    Slowly raise your left knee to your chest as you flatten your lower back against the floor. Hold for 5 seconds.    Relax and repeat the exercise with your right knee.    Do 10 of these exercises for each leg.    Repeat hugging both knees to your chest at the same time.  Building lower back strength  Start your exercise routine with 10 to 30 minutes a day, 1 to 3 times a day.  Initial exercises  Lying on your back:  1. Ankle pumps: Move your foot up and down, towards your head, and then away. Repeat 10 times with each foot.  2. Heel slides: Slowly bend your knee, drawing the heel of your foot towards you. Then slide your heel/foot from you, straightening your knee. Do not lift your foot off the floor (this is not a leg lift).  3. Abdominal contraction: Bend your knees and put your hands on your stomach. Tighten your stomach muscles. Hold for 5 seconds, then relax. Repeat 10 times.  4. Straight leg raise: Bend one leg at the knee and keep the other leg straight. Tighten your stomach muscles. Slowly lift your straight leg 6 to 12 inches off the floor and hold for up to 5 seconds. Repeat 10 times on each  side.  Standin. Wall squats: Stand with your back against the wall. Move your feet about 12 inches away from the wall. Tighten your stomach muscles, and slowly bend your knees until they are at about a 45 degree angle. Do not go down too far. Hold about 5 seconds. Then slowly return to your starting position. Repeat 10 times.  2. Heel raises: Stand facing the wall. Slowly raise the heels of your feet up and down, while keeping your toes on the floor. If you have trouble balancing, you can touch the wall with your hands. Repeat 10 times.  More advanced exercises  When you feel comfortable enough, try these exercises.  1. Kneeling lumbar extension: Begin on your hands and knees. At the same time, raise and straighten your right arm and left leg until they are parallel to the ground. Hold for 2 seconds and come back slowly to a starting position. Repeat with left arm and right leg, alternating 10 times.  2. Prone lumbar extension: Lie face down, arms extended overhead, palms on the floor. At the same time, raise your right arm and left leg as high as comfortably possible. Hold for 10 seconds and slowly return to start. Repeat with left arm and right leg, alternating 10 times. Gradually build up to 20 times. (Advanced: Repeat this exercise raising both arms and both legs a few inches off the floor at the same time. Hold for 5 seconds and release.)  3. Pelvic tilt: Lie on the floor on your back with your knees bent at 90 degrees. Your feet should be flat on the floor. Inhale, exhale, then slowly contract your abdominal muscles bringing your navel toward your spine. Let your pelvis rock back until your lower back is flat on the floor. Hold for 10 seconds while breathing smoothly.  4. Abdominal crunch: Perform a pelvic tilt (above) flattening your lower back against the floor. Holding the tension in your abdominal muscles, take another breath and raise your shoulder blades off the ground (this is not a full sit-up).  "Keep your head in line with your body (don t bend your neck forward). Hold for 2 seconds, then slowly lower.  Date Last Reviewed: 6/1/2016 2000-2017 [employer]. [address] [place] [address], [place] [address]. All rights reserved. This information is not intended as a substitute for professional medical care. Always follow your healthcare professional's instructions.          * Sciatica    Sciatica (\"Lumbar Radiculopathy\") causes a pain that spreads from the lower back down into the buttock, hip and leg. Sometimes leg pain can occur without any back pain. Sciatica is due to irritation or pressure on a spinal nerve as it comes out of the spinal canal. This is most often due to a bulge or rupture of a nearby spinal disk (the cartilage cushion between each spinal bone), which presses on a nearby nerve. Other causes include spinal stenosis (narrowing of the spinal canal) and spasm of the piriformis muscle (a muscle in the buttocks that the sciatic nerve passes through).  Sciatica may begin after a sudden twisting/bending force (such as in a car accident), or sometimes after a simple awkward movement. In either case, muscle spasm is commonly present and contributes to the pain.  The diagnosis of sciatica is made from the symptoms and physical exam. Unless you had a physical injury (such as a car accident or fall), X-rays are usually not ordered for the initial evaluation of sciatica because the nerves and disks cannot be seen on an X-ray. Most sciatica (80-90%) gets better with time.  What can I do about my low back pain?  There are three main things you can do to ease low back pain and help it go away.    Use heat or cold packs.    Take medicine as directed.    Use positions, movements and exercises. Stay active! Too much rest can make your symptoms worse.  Using heat or cold packs  Try cold packs or gentle heat to ease your pain. Use whichever gives the most relief. Apply the cold pack or heat for 15 " minutes at a time, as often as needed.  Taking medicine  If taking over-the-counter medicine:    Take ibuprofen (Advil, Motrin) 600 mg. three times a day as needed for pain.  OR    Take Aleve (naproxen sodium) 220 to 440 mg. two times a day as needed for pain  If your doctor prescribed a muscle relaxant (cyclobenzapine 10 mg.):    Take one half ( ) to 1 tablet at bedtime    Do not drive when taking this medicine. This drug may make you sleepy.  Using positions, movements and exercises  Research tells us that moving your joints and muscles can help you recover from back pain. Such activity should be simple and gentle.  Use the positions below as well as walking to help relieve your discomfort. Try taking a short walk every 3 to 4 hours during the day. Walk for a few minutes inside your home or take longer walks outside, on a treadmill or at a mall. Slowly increase the amount of time you walk. Expect discomfort when you begin, but it should lessen as your back starts to recover.  Finding a position that is comfortable  When your back pain is new, you may find that certain positions will ease your pain. Gently try each of the following positions until you find one that eases your pain. Once you find a position of comfort, use it as often as you like while you recover. Return to your daily routine as soon as possible.     Lie on your back with your legs bent. You can do this by placing a pillow under your knees or lie on the floor and rest your lower legs on the seat of a chair.    Lie on your side with your knees bent and place a pillow between your knees.    Lie on your stomach over pillows.  When should I call my doctor?  Your back pain should improve over the first couple of weeks. As it improves, you should be able to return to your normal activities. But call your doctor if:    You have a sudden change in your ability to control? your bladder or bowels.    You begin to feel tingling in your groin or legs.    The  pain spreads down your leg and into your foot.    Your toes, feet or leg muscles begin to feel weak.    You feel generally unwell or sick.    Your pain gets worse.    6728-2453 [employer]. [address] [place] [address], [place] [address]. All rights reserved. This information is not intended as a substitute for professional medical care. Always follow your healthcare professional's instructions.  This information has been modified by your health care provider with permission from the publisher.

## 2018-02-20 NOTE — LETTER
[facility]  [address] [address]  Phone: [phone]    February 20, 2018        Alison Barfield  [address] [address]          To whom it may concern:    RE: Alison Barfield    Patient was seen and treated today at our clinic and missed work due to non work related injury. I recommend taking 3 days off for rest and recovery.     Please contact me for questions or concerns.      Sincerely,        Antwan Wood PA-C

## 2018-02-21 ASSESSMENT — PATIENT HEALTH QUESTIONNAIRE - PHQ9: SUM OF ALL RESPONSES TO PHQ QUESTIONS 1-9: 2

## 2018-03-29 ENCOUNTER — TELEPHONE (OUTPATIENT)
Dept: FAMILY MEDICINE | Facility: CLINIC | Age: 53
End: 2018-03-29

## 2018-03-29 NOTE — LETTER
[facility]  [address] [address] [phone]  March 29, 2018    Alison Barfield  [address] [address]    Dear Alison,    I care about your health and have reviewed your health plan. I have reviewed your medical conditions, medication list, and lab results and am making recommendations based on this review, to better manage your health.    You are in particular need of attention regarding:  -Colon Cancer Screening    I am recommending that you:  {recommendations:-schedule a COLONOSCOPY to look for colon cancer (due every 10 years or 5 years in higher risk situations.)   Colon cancer is now the second leading cause of death in the [place] for both men and women and there are over 130,000 new cases and 50,000 deaths per year from colon cancer.  Colonoscopies can prevent 90-95% of these deaths.  Problem lesions can be removed before they ever become cancer.  This test is not only looking for cancer, but also getting rid of precancerious lesions.  If you do not wish to do a colonoscopy or cannot afford to do one, at this time, there is another option. It is called a FIT test or Fecal Immunochemical Occult Blood Test (take home stool sample kit).  It does not replace the colonoscopy for colorectal cancer screening, but it can detect hidden bleeding in the lower colon.  It does need to be repeated every year and if a positive result is obtained, you would be referred for a colonoscopy.  If you have completed either one of these tests at another facility, please have the records sent to our clinic so that we can best coordinate your care.    Here is a list of Health Maintenance topics that are due now or due soon:  Health Maintenance Due   Topic Date Due     HEPATITIS C SCREENING  09/30/1983     COLON CANCER SCREEN (SYSTEM ASSIGNED)  09/30/2015     BMP Q6 MOS  05/18/2017     DEPRESSION ACTION PLAN Q1 YR  07/01/2017      LIPID MONITORING Q1 YEAR  08/05/2017       Please call us at [phone] (or use [employer]) to address the above recommendations.     Thank you for trusting [facility] and we appreciate the opportunity to serve you.  We look forward to supporting your healthcare needs in the future.    Healthy Regards,    Susan Haase CNP

## 2018-03-29 NOTE — TELEPHONE ENCOUNTER
Panel Management Review      Patient has the following on her problem list: None      Composite cancer screening  Chart review shows that this patient is due/due soon for the following Colonoscopy  Summary:    Patient is due/failing the following:   COLONOSCOPY    Action needed:   Patient needs referral/order: Colonoscopy     Type of outreach:    Sent letter.    Questions for provider review:    Deandre Natarajan      Chart routed to none .

## 2018-05-02 DIAGNOSIS — I10 ESSENTIAL HYPERTENSION WITH GOAL BLOOD PRESSURE LESS THAN 140/90: ICD-10-CM

## 2018-05-02 DIAGNOSIS — I10 ESSENTIAL HYPERTENSION, BENIGN: ICD-10-CM

## 2018-05-02 RX ORDER — METOPROLOL SUCCINATE 25 MG/1
25 TABLET, EXTENDED RELEASE ORAL DAILY
Qty: 30 TABLET | Refills: 0 | Status: SHIPPED | OUTPATIENT
Start: 2018-05-02 | End: 2018-06-06

## 2018-05-02 RX ORDER — LOSARTAN POTASSIUM AND HYDROCHLOROTHIAZIDE 25; 100 MG/1; MG/1
1 TABLET ORAL DAILY
Qty: 30 TABLET | Refills: 0 | Status: SHIPPED | OUTPATIENT
Start: 2018-05-02 | End: 2018-06-06

## 2018-05-02 NOTE — LETTER
May 2, 2018      Alison Barfield  [address] [address]        Dear Alison,     We recently received a call from your pharmacy requesting a refill of your medications.    A review of your chart indicates that an appointment is required.  Please contact our office at [phone] to schedule your doctor's appointment for your annual hypertension visit and BP labs.     We have authorized one refill of your medication to allow time for you to schedule your appointment.    Taking care of your health is important to us and ongoing visits with your provider are vital to your care.  We look forward to seeing you in the near future.    Sincerely,    Susan Haase, CNP/Jenae GUDINO

## 2018-05-02 NOTE — TELEPHONE ENCOUNTER
Due for HYPERTENSION visit, letter sent  Prescription approved per [facility] Refill Protocol.  Jenae Silva RN, BSN

## 2018-05-02 NOTE — TELEPHONE ENCOUNTER
"Requested Prescriptions   Pending Prescriptions Disp Refills     losartan-hydrochlorothiazide (HYZAAR) 100-25 MG per tablet [Pharmacy Med Name: LOSARTAN/HCTZ 100/25MG TABLETS] 30 tablet 0     Sig: TAKE 1 TABLET BY MOUTH DAILY    Angiotensin-II Receptors Failed    5/2/2018 10:41 AM       Failed - Blood pressure under 140/90 in past 12 months    BP Readings from Last 3 Encounters:   02/20/18 150/90   01/03/17 135/86   11/18/16 114/66                Failed - Normal serum creatinine on file in past 12 months    Recent Labs   Lab Test  11/18/16   1402   CR  0.65            Failed - Normal serum potassium on file in past 12 months    Recent LabsTopril   Lab Test  11/18/16   1402   POTASSIUM  3.4                   Passed - Recent (12 mo) or future (30 days) visit within the authorizing provider's specialty    Patient had office visit in the last 12 months or has a visit in the next 30 days with authorizing provider or within the authorizing provider's specialty.  See \"Patient Info\" tab in inbasket, or \"Choose Columns\" in Meds & Orders section of the refill encounter.           Passed - Patient is age 18 or older       Passed - No active pregnancy on record       Passed - No positive pregnancy test in past 12 months     Last Written Prescription Date:  2/15/18  Last Fill Quantity: 30 tablet,  # refills: 0   Last office visit: 2/20/2018  with prescribing provider:  [place]   Future Office Visit:      Notes to Pharmacy: Needs fasting visit, may have one month if needed, please inform       metoprolol succinate (TOPROL-XL) 25 MG 24 hr tablet [Pharmacy Med Name: METOPROLOL ER SUCCINATE 25MG TABS] 30 tablet 0     Sig: TAKE 1 TABLET BY MOUTH EVERY DAY    Beta-Blockers Protocol Failed    5/2/2018 10:41 AM       Failed - Blood pressure under 140/90 in past 12 months    BP Readings from Last 3 Encounters:   02/20/18 150/90   01/03/17 135/86   11/18/16 114/66                Passed - Patient is age 6 or older       Passed - Recent (12 " "mo) or future (30 days) visit within the authorizing provider's specialty    Patient had office visit in the last 12 months or has a visit in the next 30 days with authorizing provider or within the authorizing provider's specialty.  See \"Patient Info\" tab in inbasket, or \"Choose Columns\" in Meds & Orders section of the refill encounter.         Last Written Prescription Date:  2/15/18  Last Fill Quantity: 30 tablet,  # refills: 0   Last office visit: 2/20/2018 with prescribing provider:  [place]   Future Office Visit:      Notes to Pharmacy: Needs fasting visit, may have one month if needed, please inform         "

## 2018-05-29 ENCOUNTER — TELEPHONE (OUTPATIENT)
Dept: FAMILY MEDICINE | Facility: CLINIC | Age: 53
End: 2018-05-29

## 2018-05-29 NOTE — TELEPHONE ENCOUNTER
Panel Management Review      Patient has the following on her problem list:     Depression / Dysthymia review      Patient is due for:  None    Hypertension   Last three blood pressure readings:  BP Readings from Last 3 Encounters:   02/20/18 150/90   01/03/17 135/86   11/18/16 114/66     Blood pressure: FAILED    HTN Guidelines:  Age 18-59 BP range:  Less than 140/90  Age 60-85 with Diabetes:  Less than 140/90  Age 60-85 without Diabetes:  less than 150/90      Composite cancer screening  Chart review shows that this patient is due/due soon for the following Mammogram  Summary:    Patient is due/failing the following:   BP CHECK and MAMMOGRAM-per pts chart douhble mastecetomy-chart updated no longer failing mammogram    Action needed:   Patient needs office visit for BP med check.    Type of outreach:    routed to panel pool    Questions for provider review:    None                                                                                                                                    ALEX Perez       Chart routed to Care Team .

## 2018-06-06 ENCOUNTER — OFFICE VISIT (OUTPATIENT)
Dept: FAMILY MEDICINE | Facility: CLINIC | Age: 53
End: 2018-06-06
Payer: COMMERCIAL

## 2018-06-06 VITALS
SYSTOLIC BLOOD PRESSURE: 136 MMHG | OXYGEN SATURATION: 96 % | HEART RATE: 86 BPM | WEIGHT: 213 LBS | BODY MASS INDEX: 34.23 KG/M2 | TEMPERATURE: 98 F | HEIGHT: 66 IN | DIASTOLIC BLOOD PRESSURE: 82 MMHG

## 2018-06-06 DIAGNOSIS — J20.9 ACUTE BRONCHITIS, UNSPECIFIED ORGANISM: ICD-10-CM

## 2018-06-06 DIAGNOSIS — Z12.11 SPECIAL SCREENING FOR MALIGNANT NEOPLASMS, COLON: ICD-10-CM

## 2018-06-06 DIAGNOSIS — R91.8 PULMONARY NODULES: ICD-10-CM

## 2018-06-06 DIAGNOSIS — R76.8 ELEVATED RHEUMATOID FACTOR: ICD-10-CM

## 2018-06-06 DIAGNOSIS — G43.009 MIGRAINE WITHOUT AURA AND WITHOUT STATUS MIGRAINOSUS, NOT INTRACTABLE: ICD-10-CM

## 2018-06-06 DIAGNOSIS — C50.911 MALIGNANT NEOPLASM OF RIGHT FEMALE BREAST, UNSPECIFIED ESTROGEN RECEPTOR STATUS, UNSPECIFIED SITE OF BREAST (H): ICD-10-CM

## 2018-06-06 DIAGNOSIS — F32.0 MILD MAJOR DEPRESSION (H): ICD-10-CM

## 2018-06-06 DIAGNOSIS — I10 ESSENTIAL HYPERTENSION WITH GOAL BLOOD PRESSURE LESS THAN 140/90: Primary | ICD-10-CM

## 2018-06-06 LAB
ALBUMIN SERPL-MCNC: 4.3 G/DL (ref 3.4–5)
ALP SERPL-CCNC: 73 U/L (ref 40–150)
ALT SERPL W P-5'-P-CCNC: 69 U/L (ref 0–50)
ANION GAP SERPL CALCULATED.3IONS-SCNC: 11 MMOL/L (ref 3–14)
AST SERPL W P-5'-P-CCNC: 46 U/L (ref 0–45)
BASOPHILS # BLD AUTO: 0 10E9/L (ref 0–0.2)
BASOPHILS NFR BLD AUTO: 0.4 %
BILIRUB SERPL-MCNC: 0.4 MG/DL (ref 0.2–1.3)
BUN SERPL-MCNC: 12 MG/DL (ref 7–30)
CALCIUM SERPL-MCNC: 9.4 MG/DL (ref 8.5–10.1)
CHLORIDE SERPL-SCNC: 100 MMOL/L (ref 94–109)
CO2 SERPL-SCNC: 26 MMOL/L (ref 20–32)
CREAT SERPL-MCNC: 0.62 MG/DL (ref 0.52–1.04)
CRP SERPL-MCNC: 6.9 MG/L (ref 0–8)
DIFFERENTIAL METHOD BLD: NORMAL
EOSINOPHIL # BLD AUTO: 0.1 10E9/L (ref 0–0.7)
EOSINOPHIL NFR BLD AUTO: 1.6 %
ERYTHROCYTE [DISTWIDTH] IN BLOOD BY AUTOMATED COUNT: 12.3 % (ref 10–15)
ERYTHROCYTE [SEDIMENTATION RATE] IN BLOOD BY WESTERGREN METHOD: 19 MM/H (ref 0–30)
GFR SERPL CREATININE-BSD FRML MDRD: >90 ML/MIN/1.7M2
GLUCOSE SERPL-MCNC: 102 MG/DL (ref 70–99)
HCT VFR BLD AUTO: 39.8 % (ref 35–47)
HGB BLD-MCNC: 13.5 G/DL (ref 11.7–15.7)
LDLC SERPL DIRECT ASSAY-MCNC: 147 MG/DL
LYMPHOCYTES # BLD AUTO: 2.4 10E9/L (ref 0.8–5.3)
LYMPHOCYTES NFR BLD AUTO: 28.5 %
MCH RBC QN AUTO: 31.3 PG (ref 26.5–33)
MCHC RBC AUTO-ENTMCNC: 33.9 G/DL (ref 31.5–36.5)
MCV RBC AUTO: 92 FL (ref 78–100)
MONOCYTES # BLD AUTO: 0.9 10E9/L (ref 0–1.3)
MONOCYTES NFR BLD AUTO: 10.4 %
NEUTROPHILS # BLD AUTO: 4.9 10E9/L (ref 1.6–8.3)
NEUTROPHILS NFR BLD AUTO: 59.1 %
PLATELET # BLD AUTO: 384 10E9/L (ref 150–450)
POTASSIUM SERPL-SCNC: 3.4 MMOL/L (ref 3.4–5.3)
PROT SERPL-MCNC: 8.5 G/DL (ref 6.8–8.8)
RBC # BLD AUTO: 4.32 10E12/L (ref 3.8–5.2)
SODIUM SERPL-SCNC: 137 MMOL/L (ref 133–144)
TSH SERPL DL<=0.005 MIU/L-ACNC: 1.32 MU/L (ref 0.4–4)
WBC # BLD AUTO: 8.3 10E9/L (ref 4–11)

## 2018-06-06 PROCEDURE — 85652 RBC SED RATE AUTOMATED: CPT | Performed by: NURSE PRACTITIONER

## 2018-06-06 PROCEDURE — 99214 OFFICE O/P EST MOD 30 MIN: CPT | Performed by: NURSE PRACTITIONER

## 2018-06-06 PROCEDURE — 84443 ASSAY THYROID STIM HORMONE: CPT | Performed by: NURSE PRACTITIONER

## 2018-06-06 PROCEDURE — 86140 C-REACTIVE PROTEIN: CPT | Performed by: NURSE PRACTITIONER

## 2018-06-06 PROCEDURE — 85025 COMPLETE CBC W/AUTO DIFF WBC: CPT | Performed by: NURSE PRACTITIONER

## 2018-06-06 PROCEDURE — 36415 COLL VENOUS BLD VENIPUNCTURE: CPT | Performed by: NURSE PRACTITIONER

## 2018-06-06 PROCEDURE — 80053 COMPREHEN METABOLIC PANEL: CPT | Performed by: NURSE PRACTITIONER

## 2018-06-06 PROCEDURE — 83721 ASSAY OF BLOOD LIPOPROTEIN: CPT | Performed by: NURSE PRACTITIONER

## 2018-06-06 RX ORDER — SUMATRIPTAN 100 MG/1
TABLET, FILM COATED ORAL
Qty: 6 TABLET | Refills: 1 | Status: SHIPPED | OUTPATIENT
Start: 2018-06-06 | End: 2019-08-22

## 2018-06-06 RX ORDER — ALBUTEROL SULFATE 90 UG/1
2 AEROSOL, METERED RESPIRATORY (INHALATION) EVERY 4 HOURS PRN
Qty: 1 INHALER | Refills: 11 | Status: SHIPPED | OUTPATIENT
Start: 2018-06-06 | End: 2018-09-05

## 2018-06-06 RX ORDER — METOPROLOL SUCCINATE 25 MG/1
25 TABLET, EXTENDED RELEASE ORAL DAILY
Qty: 90 TABLET | Refills: 3 | Status: SHIPPED | OUTPATIENT
Start: 2018-06-06 | End: 2019-07-05

## 2018-06-06 RX ORDER — LOSARTAN POTASSIUM AND HYDROCHLOROTHIAZIDE 25; 100 MG/1; MG/1
1 TABLET ORAL DAILY
Qty: 90 TABLET | Refills: 3 | Status: SHIPPED | OUTPATIENT
Start: 2018-06-06 | End: 2019-07-05

## 2018-06-06 ASSESSMENT — PATIENT HEALTH QUESTIONNAIRE - PHQ9
SUM OF ALL RESPONSES TO PHQ QUESTIONS 1-9: 1
SUM OF ALL RESPONSES TO PHQ QUESTIONS 1-9: 1

## 2018-06-06 NOTE — MR AVS SNAPSHOT
After Visit Summary   6/6/2018    Alison Barfield    MRN: [id number]           Patient Information     Date Of Birth          1965        Visit Information        Provider Department      6/6/2018 11:15 AM Haase, Susan Rachele, APRN CNP [facility]        Today's Diagnoses     Essential hypertension with goal blood pressure less than 140/90    -  1    Elevated rheumatoid factor        Mild major depression (H)        Acute bronchitis, unspecified organism        BENIGN HYPERTENSION        Cervicalgia        Migraine without aura and without status migrainosus, not intractable        Malignant neoplasm of right female breast, unspecified estrogen receptor status, unspecified site of breast (H)        Pulmonary nodules           Follow-ups after your visit        Additional Services     BREAST CENTER REFERRAL       Your provider has referred you to: [place]: [facility] [place] [phone]   [email or web address]    Please be aware that coverage of these services is subject to the terms and limitations of your health insurance plan.  Call member services at your health plan with any benefit or coverage questions.      Please bring the following with you to your appointment:    (1) Any X-Rays, CTs or MRIs which have been performed.  Contact the facility where they were done to arrange for  prior to your scheduled appointment.    (2) List of current medications   (3) This referral request   (4) Any documents/labs given to you for this referral                  Follow-up notes from your care team     Return in about 6 months (around 12/6/2018).      Who to contact     If you have questions or need follow up information about today's clinic visit or your schedule please contact [facility] directly at [phone].  Normal or non-critical lab and imaging results will be communicated to you by Zachary  "letter or phone within 4 business days after the clinic has received the results. If you do not hear from us within 7 days, please contact the clinic through Imagiin. or phone. If you have a critical or abnormal lab result, we will notify you by phone as soon as possible.  Submit refill requests through Imagiin. or call your pharmacy and they will forward the refill request to us. Please allow 3 business days for your refill to be completed.          Additional Information About Your Visit        Imagiin. Information     Imagiin. gives you secure access to your electronic health record. If you see a primary care provider, you can also send messages to your care team and make appointments. If you have questions, please call your primary care clinic.  If you do not have a primary care provider, please call [phone] and they will assist you.        Care EveryWhere ID     This is your Care EveryWhere ID. This could be used by other organizations to access your [place] medical records  [id number]        Your Vitals Were     Pulse Temperature Height Last Period Pulse Oximetry BMI (Body Mass Index)    86 98  F (36.7  C) (Oral) 5' 5.5\" (1.664 m) 09/09/2010 96% 34.91 kg/m2       Blood Pressure from Last 3 Encounters:   06/06/18 136/82   02/20/18 150/90   01/03/17 135/86    Weight from Last 3 Encounters:   06/06/18 213 lb (96.6 kg)   02/20/18 223 lb (101.2 kg)   01/03/17 222 lb (100.7 kg)              We Performed the Following     BREAST CENTER REFERRAL     CBC with platelets differential     Comprehensive metabolic panel     CRP inflammation     CT Chest w/o Contrast     DEPRESSION ACTION PLAN (DAP)     Erythrocyte sedimentation rate auto     LDL cholesterol direct     TSH with free T4 reflex          Today's Medication Changes          These changes are accurate as of 6/6/18 11:35 AM.  If you have any questions, ask your nurse or doctor.               These medicines have changed or have updated prescriptions.        " Dose/Directions    losartan-hydrochlorothiazide 100-25 MG per tablet   Commonly known as:  HYZAAR   This may have changed:  additional instructions   Used for:  Essential hypertension, benign   Changed by:  Haase, Susan Rachele, APRN CNP        Dose:  1 tablet   Take 1 tablet by mouth daily   Quantity:  90 tablet   Refills:  3       metoprolol succinate 25 MG 24 hr tablet   Commonly known as:  TOPROL-XL   This may have changed:  additional instructions   Used for:  Essential hypertension with goal blood pressure less than 140/90   Changed by:  Haase, Susan Rachele, APRN CNP        Dose:  25 mg   Take 1 tablet (25 mg) by mouth daily   Quantity:  90 tablet   Refills:  3            Where to get your medicines      These medications were sent to [facility] [address] [place] [address]  [id number] [place] [address]     Phone:  [phone]     albuterol 108 (90 Base) MCG/ACT Inhaler    losartan-hydrochlorothiazide 100-25 MG per tablet    metoprolol succinate 25 MG 24 hr tablet    SUMAtriptan 100 MG tablet                Primary Care Provider Office Phone # Fax #    Susan Rachele Haase, APRN [phone] [address] [facility] [address]        Equal Access to Services     JAYME IRELAND AH: Hadii shefali ku hadasho Soomaali, waaxda luqadaha, qaybta kaalmada adeegyada, waxay idiin haysangeeta morrissey. So [facility] [phone].    ATENCIÓN: Si habla español, tiene a mehta disposición servicios gratuitos de asistencia lingüística. ame al [phone].    We comply with applicable federal civil rights laws and [place] laws. We do not discriminate on the basis of race, color, national origin, age, disability, sex, sexual orientation, or gender identity.            Thank you!     Thank you for choosing [facility]  for your care. Our goal is always to provide you with excellent care. Hearing back from our  patients is one way we can continue to improve our services. Please take a few minutes to complete the written survey that you may receive in the mail after your visit with us. Thank you!             Your Updated Medication List - Protect others around you: Learn how to safely use, store and throw away your medicines at www.disposemymeds.org.          This list is accurate as of 6/6/18 11:35 AM.  Always use your most recent med list.                   Brand Name Dispense Instructions for use Diagnosis    albuterol 108 (90 Base) MCG/ACT Inhaler    PROAIR HFA/PROVENTIL HFA/VENTOLIN HFA    1 Inhaler    Inhale 2 puffs into the lungs every 4 hours as needed for shortness of breath / dyspnea or wheezing    Acute bronchitis, unspecified organism       losartan-hydrochlorothiazide 100-25 MG per tablet    HYZAAR    90 tablet    Take 1 tablet by mouth daily    Essential hypertension, benign       methylPREDNISolone 4 MG tablet    MEDROL DOSEPAK    21 tablet    Follow package instructions    Acute right-sided low back pain with left-sided sciatica       metoprolol succinate 25 MG 24 hr tablet    TOPROL-XL    90 tablet    Take 1 tablet (25 mg) by mouth daily    Essential hypertension with goal blood pressure less than 140/90       SUMAtriptan 100 MG tablet    IMITREX    6 tablet    Take 1 tablet by mouth. May repeat in 2 hours if needed: max 2/day; average number of headaches monthly 1    Cervicalgia, Migraine without aura and without status migrainosus, not intractable       TYLENOL PO      Take 1,000 mg by mouth as needed.

## 2018-06-06 NOTE — PROGRESS NOTES
Candelario Rosas,  Your CBC (checks for anemia and infection) was normal.  Your sed rate (test for inflammation) was normal at 19.  Sincerely,  Susan Haase, CNP

## 2018-06-07 ASSESSMENT — PATIENT HEALTH QUESTIONNAIRE - PHQ9: SUM OF ALL RESPONSES TO PHQ QUESTIONS 1-9: 1

## 2018-06-07 NOTE — PROGRESS NOTES
Candelario Rosas,  Your lab results are as below:  1)  TSH (thyroid level) 1.32 which is normal (range 0.4-4)  2)  Your LDL (bad cholesterol) was normal at 147. Continue to follow a low cholesterol diet and we will recheck this in 1 year.  3)  Glucose was normal at 102 (normal fasting is <100).  4)  CRP (test for inflammation) was normal at 6.9.     If you have any questions do not hesitate to call the clinic to discuss the results with me further.     Sincerely,    Susan Haase, CNP

## 2018-07-02 ENCOUNTER — RADIANT APPOINTMENT (OUTPATIENT)
Dept: GENERAL RADIOLOGY | Facility: CLINIC | Age: 53
End: 2018-07-02
Attending: NURSE PRACTITIONER
Payer: COMMERCIAL

## 2018-07-02 ENCOUNTER — OFFICE VISIT (OUTPATIENT)
Dept: FAMILY MEDICINE | Facility: CLINIC | Age: 53
End: 2018-07-02
Payer: COMMERCIAL

## 2018-07-02 VITALS
HEART RATE: 66 BPM | SYSTOLIC BLOOD PRESSURE: 120 MMHG | OXYGEN SATURATION: 98 % | DIASTOLIC BLOOD PRESSURE: 60 MMHG | BODY MASS INDEX: 34.91 KG/M2 | RESPIRATION RATE: 10 BRPM | TEMPERATURE: 98.4 F | WEIGHT: 213 LBS

## 2018-07-02 DIAGNOSIS — M79.644 THUMB PAIN, RIGHT: ICD-10-CM

## 2018-07-02 DIAGNOSIS — M79.644 THUMB PAIN, RIGHT: Primary | ICD-10-CM

## 2018-07-02 PROCEDURE — 99213 OFFICE O/P EST LOW 20 MIN: CPT | Performed by: NURSE PRACTITIONER

## 2018-07-02 PROCEDURE — 73140 X-RAY EXAM OF FINGER(S): CPT | Mod: RT

## 2018-07-02 NOTE — PROGRESS NOTES
SUBJECTIVE:   Alison Barfield is a 52 year old female who presents to clinic today for the following health issues:    Musculoskeletal problem/pain      Duration: 1 month    Description  Location: R thumb    Intensity:  Severe at times    Accompanying signs and symptoms: numbness, tingling and swelling    History  Previous similar problem: no   Previous evaluation:  none    Precipitating or alleviating factors:  Trauma or overuse: no   Aggravating factors include: none    Therapies tried and outcome: ice and support wrap    Right thumb pain and swelling began 1 month ago. Associated tingling in thumb, depending on activity. She will wake up to her thumb locked, unable to move it without significant pain. She must assist with left hand to regain mobility. When she bends finger medially, thumb shakes. She reports dropping objects due to weakness. Tried icing, applying heat pad, BioFreeze and taking ibuprofen, all with little relief. Has been wearing brace at work daily for 3 weeks. Symptoms cause difficulty to complete her job.       Problem list and histories reviewed & adjusted, as indicated.  Additional history: as documented    Patient Active Problem List   Diagnosis     Malaise and fatigue     Mild major depression (H)     Breast cyst     High triglycerides     Ductal carcinoma in situ of breast     CARDIOVASCULAR SCREENING; LDL GOAL LESS THAN 130     Breast cancer (H)     Health Care Home     Elevated rheumatoid factor     Migraine     S/P abdominal supracervical subtotal hysterectomy     Pain of left thumb     Pulmonary nodules     Essential hypertension with goal blood pressure less than 140/90     Past Surgical History:   Procedure Laterality Date     BREAST SURGERY      masectomy-double     C NONSPECIFIC PROCEDURE      Child birth x 3     EXCISE MASS NECK  11/21/2012    Procedure: EXCISE MASS NECK;  Excision of Left Deep Neck Cyst;  Surgeon: Elmer Hernandez MD;  Location: [facility] OR     HYSTERECTOMY, PAP NO  LONGER INDICATED       kidney stone extraction       and      LAPAROSCOPIC ASSISTED HYSTERECTOMY VAGINAL, BILATERAL SALPINGO-OOPHORECTOMY, COMBINED      hysterectomy - 2010 - [facility]     MASTECTOMY, BILATERAL         Social History   Substance Use Topics     Smoking status: Former Smoker     Types: Cigarettes     Smokeless tobacco: Never Used      Comment: Quit in      Alcohol use No     Family History   Problem Relation Age of Onset     Cancer Father      lung CA     Other - See Comments Father       from blood clot     Cancer Brother      squamous cell of the jaw     Hypertension Maternal Grandmother      Cerebrovascular Disease Maternal Grandmother          Current Outpatient Prescriptions   Medication Sig Dispense Refill     Acetaminophen (TYLENOL PO) Take 1,000 mg by mouth as needed.       albuterol (PROAIR HFA/PROVENTIL HFA/VENTOLIN HFA) 108 (90 Base) MCG/ACT Inhaler Inhale 2 puffs into the lungs every 4 hours as needed for shortness of breath / dyspnea or wheezing 1 Inhaler 11     losartan-hydrochlorothiazide (HYZAAR) 100-25 MG per tablet Take 1 tablet by mouth daily 90 tablet 3     metoprolol succinate (TOPROL-XL) 25 MG 24 hr tablet Take 1 tablet (25 mg) by mouth daily 90 tablet 3     SUMAtriptan (IMITREX) 100 MG tablet Take 1 tablet by mouth. May repeat in 2 hours if needed: max 2/day; average number of headaches monthly 1 6 tablet 1     Allergies   Allergen Reactions     Cefprozil      Cefzil rash and facial swelling     Erythromycin      GI upset     Sulfa Drugs        Reviewed and updated as needed this visit by clinical staff       Reviewed and updated as needed this visit by Provider         ROS:  Positive for arthralgia   Constitutional, musculoskeletal, neuro, systems are negative, except as otherwise noted.    This document serves as a record of the services and decisions personally performed and made by Susan Haase, CNP. It was created on her behalf by Surekha Kim,  a trained medical scribe. The creation of this document is based on the provider's statements to the medical scribe.  Surekha Kim 2:55 PM July 2, 2018  OBJECTIVE:   /60 (BP Location: Left arm, Patient Position: Chair, Cuff Size: Adult Regular)  Pulse 66  Temp 98.4  F (36.9  C) (Oral)  Resp 10  Wt 96.6 kg (213 lb)  LMP 09/09/2010  SpO2 98%  BMI 34.91 kg/m2  Body mass index is 34.91 kg/(m^2).  GENERAL: healthy, alert and no distress  MS: tenderness and edema along the thenar eminence and MCP joint of right thumb. Full   NEURO: slight weakness with pinch grasp, CMS intact.   PSYCH: mentation appears normal, affect normal/bright    ASSESSMENT/PLAN:   Alison was seen today for musculoskeletal problem.    Diagnoses and all orders for this visit:    Thumb pain, right:  Xray appears without avulsion fracture, radiologist reading pending, patient informed. With MCP joint pain that hasn't been relieved with brace, ice will refer to ortho for further eval.  -     XR Finger Right G/E 2 Views; Future  -     ORTHO  REFERRAL    Follow up in 1-2 months for physical exam    The information in this document, created by the medical scribe for me, accurately reflects the services I personally performed and the decisions made by me. I have reviewed and approved this document for accuracy prior to leaving the patient care area.  July 2, 2018 3:00 PM  Susan Haase, APRN [facility] [place]

## 2018-07-02 NOTE — MR AVS SNAPSHOT
After Visit Summary   7/2/2018    Alison Barfield    MRN: [id number]           Patient Information     Date Of Birth          1965        Visit Information        Provider Department      7/2/2018 2:45 PM Haase, Susan Rachele, APRN CNP [facility]        Today's Diagnoses     Thumb pain, right    -  1       Follow-ups after your visit        Additional Services     ORTHO  REFERRAL       [facility] is referring you to the Orthopedic  Services at [facility].       The  Representative will assist you in the coordination of your Orthopedic and Musculoskeletal Care as prescribed by your physician.    The  Representative will call you within 1 business day to help schedule your appointment, or you may contact the  Representative at:    All areas ~ [phone]     Type of Referral : Non Surgical       Timeframe requested: 3 - 5 days    Coverage of these services is subject to the terms and limitations of your health insurance plan.  Please call member services at your health plan with any benefit or coverage questions.      If X-rays, CT or MRI's have been performed, please contact the facility where they were done to arrange for , prior to your scheduled appointment.  Please bring this referral request to your appointment and present it to your specialist.                  Who to contact     If you have questions or need follow up information about today's clinic visit or your schedule please contact [facility] directly at [phone].  Normal or non-critical lab and imaging results will be communicated to you by MyChart, letter or phone within 4 business days after the clinic has received the results. If you do not hear from us within 7 days, please contact the clinic through MyChart or phone. If you have a critical or abnormal lab result, we will notify you by phone as  soon as possible.  Submit refill requests through DrawQuest or call your pharmacy and they will forward the refill request to us. Please allow 3 business days for your refill to be completed.          Additional Information About Your Visit        AtmailharHigher One Information     DrawQuest gives you secure access to your electronic health record. If you see a primary care provider, you can also send messages to your care team and make appointments. If you have questions, please call your primary care clinic.  If you do not have a primary care provider, please call [phone] and they will assist you.        Care EveryWhere ID     This is your Care EveryWhere ID. This could be used by other organizations to access your [place] medical records  [id number]        Your Vitals Were     Pulse Temperature Respirations Last Period Pulse Oximetry BMI (Body Mass Index)    66 98.4  F (36.9  C) (Oral) 10 09/09/2010 98% 34.91 kg/m2       Blood Pressure from Last 3 Encounters:   07/02/18 120/60   06/06/18 136/82   02/20/18 150/90    Weight from Last 3 Encounters:   07/02/18 213 lb (96.6 kg)   06/06/18 213 lb (96.6 kg)   02/20/18 223 lb (101.2 kg)              We Performed the Following     ORTHO  REFERRAL          Today's Medication Changes          These changes are accurate as of 7/2/18  3:24 PM.  If you have any questions, ask your nurse or doctor.               Stop taking these medicines if you haven't already. Please contact your care team if you have questions.     methylPREDNISolone 4 MG tablet   Commonly known as:  MEDROL DOSEPAK   Stopped by:  Haase, Susan Rachele, APRN CNP                    Primary Care Provider Office Phone # Fax #    Susan Rachele Haase, APRN [phone] [address] [facility] [address]        Equal Access to Services     JAYME IRELAND : Pankaj Izaguirre, rae gil, qaybta kaaldarrian white. So [facility]  [phone].    ATENCIÓN: Si renaldo almeida, tiene a mehta disposición servicios gratuitos de asistencia lingüística. Srii jiménez [phone].    We comply with applicable federal civil rights laws and [place] laws. We do not discriminate on the basis of race, color, national origin, age, disability, sex, sexual orientation, or gender identity.            Thank you!     Thank you for choosing [facility]  for your care. Our goal is always to provide you with excellent care. Hearing back from our patients is one way we can continue to improve our services. Please take a few minutes to complete the written survey that you may receive in the mail after your visit with us. Thank you!             Your Updated Medication List - Protect others around you: Learn how to safely use, store and throw away your medicines at www.disposemymeds.org.          This list is accurate as of 7/2/18  3:24 PM.  Always use your most recent med list.                   Brand Name Dispense Instructions for use Diagnosis    albuterol 108 (90 Base) MCG/ACT Inhaler    PROAIR HFA/PROVENTIL HFA/VENTOLIN HFA    1 Inhaler    Inhale 2 puffs into the lungs every 4 hours as needed for shortness of breath / dyspnea or wheezing    Acute bronchitis, unspecified organism       losartan-hydrochlorothiazide 100-25 MG per tablet    HYZAAR    90 tablet    Take 1 tablet by mouth daily    Essential hypertension with goal blood pressure less than 140/90       metoprolol succinate 25 MG 24 hr tablet    TOPROL-XL    90 tablet    Take 1 tablet (25 mg) by mouth daily    Essential hypertension with goal blood pressure less than 140/90       SUMAtriptan 100 MG tablet    IMITREX    6 tablet    Take 1 tablet by mouth. May repeat in 2 hours if needed: max 2/day; average number of headaches monthly 1    Migraine without aura and without status migrainosus, not intractable       TYLENOL PO      Take 1,000 mg by mouth as needed.

## 2018-07-05 ENCOUNTER — OFFICE VISIT (OUTPATIENT)
Dept: ORTHOPEDICS | Facility: CLINIC | Age: 53
End: 2018-07-05
Payer: COMMERCIAL

## 2018-07-05 VITALS
BODY MASS INDEX: 34.23 KG/M2 | WEIGHT: 213 LBS | DIASTOLIC BLOOD PRESSURE: 72 MMHG | HEIGHT: 66 IN | SYSTOLIC BLOOD PRESSURE: 123 MMHG

## 2018-07-05 DIAGNOSIS — M65.311 TRIGGER THUMB OF RIGHT HAND: Primary | ICD-10-CM

## 2018-07-05 PROCEDURE — 99203 OFFICE O/P NEW LOW 30 MIN: CPT | Mod: 25 | Performed by: FAMILY MEDICINE

## 2018-07-05 PROCEDURE — 20550 NJX 1 TENDON SHEATH/LIGAMENT: CPT | Mod: F5 | Performed by: FAMILY MEDICINE

## 2018-07-05 RX ORDER — LIDOCAINE HYDROCHLORIDE 10 MG/ML
0.5 INJECTION, SOLUTION INFILTRATION; PERINEURAL ONCE
Qty: 0.5 ML | Refills: 0 | OUTPATIENT
Start: 2018-07-05 | End: 2018-07-05

## 2018-07-05 RX ORDER — METHYLPREDNISOLONE ACETATE 40 MG/ML
20 INJECTION, SUSPENSION INTRA-ARTICULAR; INTRALESIONAL; INTRAMUSCULAR; SOFT TISSUE ONCE
Qty: 0.5 ML | Refills: 0 | OUTPATIENT
Start: 2018-07-05 | End: 2018-07-05

## 2018-07-05 NOTE — MR AVS SNAPSHOT
"              After Visit Summary   7/5/2018    Alison Barfield    MRN: [id number]           Patient Information     Date Of Birth          1965        Visit Information        Provider Department      7/5/2018 2:00 PM Jose Barrett MD [facility]        Today's Diagnoses     Trigger thumb of right hand    -  1       Follow-ups after your visit        Who to contact     If you have questions or need follow up information about today's clinic visit or your schedule please contact [facility] directly at [phone].  Normal or non-critical lab and imaging results will be communicated to you by B[employer]vy[employer], letter or phone within 4 business days after the clinic has received the results. If you do not hear from us within 7 days, please contact the clinic through [employer] or phone. If you have a critical or abnormal lab result, we will notify you by phone as soon as possible.  Submit refill requests through [facility] or call your pharmacy and they will forward the refill request to us. Please allow 3 business days for your refill to be completed.          Additional Information About Your Visit        MyChart Information     [facility] gives you secure access to your electronic health record. If you see a primary care provider, you can also send messages to your care team and make appointments. If you have questions, please call your primary care clinic.  If you do not have a primary care provider, please call [phone] and they will assist you.        Care EveryWhere ID     This is your Care EveryWhere ID. This could be used by other organizations to access your [facility] medical records  [id number]        Your Vitals Were     Height Last Period BMI (Body Mass Index)             5' 5.5\" (1.664 m) 09/09/2010 34.91 kg/m2          Blood Pressure from Last 3 Encounters:   07/05/18 123/72   07/02/18 120/60   06/06/18 136/82    Weight from Last 3 Encounters:   07/05/18 213 " lb (96.6 kg)   07/02/18 213 lb (96.6 kg)   06/06/18 213 lb (96.6 kg)              We Performed the Following     INJECTION SINGLE TENDON SHEATH/LIGAMENT     METHYLPREDNISOLONE 40 MG INJ          Today's Medication Changes          These changes are accurate as of 7/5/18  2:58 PM.  If you have any questions, ask your nurse or doctor.               Start taking these medicines.        Dose/Directions    lidocaine 1 % injection   Used for:  Trigger thumb of right hand   Started by:  Jose Barrett MD        Dose:  0.5 mL   0.5 mLs by INTRA-ARTICULAR route once for 1 dose   Quantity:  0.5 mL   Refills:  0       methylPREDNISolone acetate 40 MG/ML injection   Commonly known as:  DEPO-MEDROL   Used for:  Trigger thumb of right hand   Started by:  Jose Barrett MD        Dose:  20 mg   0.5 mLs (20 mg) by INTRA-ARTICULAR route once for 1 dose MEDICATION: Depo Medrol 40mg/ml ROUTE: Intra-articular joint injection  SITE: thumb  DOSE: 20 LOT #: [id number] : Pfizer EXPIRATION DATE:  4/2020 NDC: 4345-3621-92   Quantity:  0.5 mL   Refills:  0            Where to get your medicines      Some of these will need a paper prescription and others can be bought over the counter.  Ask your nurse if you have questions.     You don't need a prescription for these medications     lidocaine 1 % injection    methylPREDNISolone acetate 40 MG/ML injection                Primary Care Provider Office Phone # Fax #    Soila Moranhele Haase, APRN [phone] [address] [id number]        Equal Access to Services     [facility] AH: Hadii shefali ku hadasho Soyaneali, waaxda luqadaha, qaybta kaalmada adeegyada, darrian morrissey. So [facility] [phone].    ATENCIÓN: Si habla español, tiene a mehta disposición servicios gratuitos de asistencia lingüística. Llame al [phone].    We comply with applicable federal civil rights laws and [place] laws. We do not  discriminate on the basis of race, color, national origin, age, disability, sex, sexual orientation, or gender identity.            Thank you!     Thank you for choosing [facility]  for your care. Our goal is always to provide you with excellent care. Hearing back from our patients is one way we can continue to improve our services. Please take a few minutes to complete the written survey that you may receive in the mail after your visit with us. Thank you!             Your Updated Medication List - Protect others around you: Learn how to safely use, store and throw away your medicines at www.disposemymeds.org.          This list is accurate as of 7/5/18  2:58 PM.  Always use your most recent med list.                   Brand Name Dispense Instructions for use Diagnosis    albuterol 108 (90 Base) MCG/ACT Inhaler    PROAIR HFA/PROVENTIL HFA/VENTOLIN HFA    1 Inhaler    Inhale 2 puffs into the lungs every 4 hours as needed for shortness of breath / dyspnea or wheezing    Acute bronchitis, unspecified organism       lidocaine 1 % injection     0.5 mL    0.5 mLs by INTRA-ARTICULAR route once for 1 dose    Trigger thumb of right hand       losartan-hydrochlorothiazide 100-25 MG per tablet    HYZAAR    90 tablet    Take 1 tablet by mouth daily    Essential hypertension with goal blood pressure less than 140/90       methylPREDNISolone acetate 40 MG/ML injection    DEPO-MEDROL    0.5 mL    0.5 mLs (20 mg) by INTRA-ARTICULAR route once for 1 dose MEDICATION: Depo Medrol 40mg/ml ROUTE: Intra-articular joint injection  SITE: thumb  DOSE: 20 LOT #: [id number] : Pfizer EXPIRATION DATE:  4/2020 NDC: 0765-8754-62    Trigger thumb of right hand       metoprolol succinate 25 MG 24 hr tablet    TOPROL-XL    90 tablet    Take 1 tablet (25 mg) by mouth daily    Essential hypertension with goal blood pressure less than 140/90       SUMAtriptan 100 MG tablet    IMITREX    6 tablet    Take 1 tablet by mouth.  May repeat in 2 hours if needed: max 2/day; average number of headaches monthly 1    Migraine without aura and without status migrainosus, not intractable       TYLENOL PO      Take 1,000 mg by mouth as needed.

## 2018-07-05 NOTE — LETTER
7/5/2018         RE: Alison Barfield  [address]  [place] [address]        Dear Colleague,    Thank you for referring your patient, Alison Barfield, to the [facility]. Please see a copy of my visit note below.    HPI   [facility]   Clinic Visit s Jul 5, 2018    PCP: Haase, Susan Rachele      Alison is a 52 year old female who is seen in consultation at the request of Dr. Haase for   Chief Complaint   Patient presents with     Right Hand - Pain       Injury: Reports insidious onset without acute precipitating event.     right-hand dominant    Location of Pain: right thumb, metacarpophalangeal, IP joint  Duration of Pain: 4 week(s)  Rating of Pain at worst: 10/10  Rating of Pain Currently: 2/10  Pain is better with: massage, movement  Pain is worse with: at night, mornings  Treatment so far consists of: activity avoidance and  brace  Associated symptoms:  triggering,   Recent imaging completed: X-rays completed 7/2/18.  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     Past Medical History:   Diagnosis Date     Allergic rhinitis, cause unspecified      Anxiety     pt requests relaxant prior to surgery     BENIGN HYPERTENSION 11/2/2004     Calculus of kidney      Elevated rheumatoid factor 12/2/2011     HTN, goal below 140/90 6/13/2013     Infectious mononucleosis      Malignant neoplasm (H)      Migraine 2/20/2012       Patient Active Problem List    Diagnosis Date Noted     Essential hypertension with goal blood pressure less than 140/90 07/01/2016     Priority: Medium     Pulmonary nodules 01/30/2016     Priority: Medium     1/29/2016:  On CT, recommend follow up in 1 year,  Follow up completed in 8/2017 with no changes in the lung nodule.  Recommend 6 month follow up due in 2/2018       Pain of left thumb 01/17/2014     Priority: Medium     S/P abdominal supracervical subtotal  hysterectomy 2013     Priority: Medium     Migraine 2012     Priority: Medium     Elevated rheumatoid factor 2011     Priority: Medium     Health Care Home 2011     Priority: Medium     x  DX V65.8 REPLACED WITH [facility] (2013)       Breast cancer (H) 2010     Priority: Medium     CARDIOVASCULAR SCREENING; LDL GOAL LESS THAN 130 10/31/2010     Priority: Medium     Ductal carcinoma in situ of breast 2010     Priority: Medium     Mild major depression (H) 2010     Priority: Medium     Breast cyst 2010     Priority: Medium     High triglycerides 2010     Priority: Medium     Malaise and fatigue 08/15/2006     Priority: Medium     Problem list name updated by automated process. Provider to review         Family History   Problem Relation Age of Onset     Cancer Father      lung CA     Other - See Comments Father       from blood clot     Cancer Brother      squamous cell of the jaw     Hypertension Maternal Grandmother      Cerebrovascular Disease Maternal Grandmother        Social History     Social History     Marital status:      Spouse name: Kurt     Number of children: 3     Years of education: N/A     Occupational History     clerical None      Social History Main Topics     Smoking status: Former Smoker     Types: Cigarettes     Smokeless tobacco: Never Used      Comment: Quit in      Alcohol use No     Drug use: No     Sexual activity: Yes       Past Surgical History:   Procedure Laterality Date     BREAST SURGERY      masectomy-double     C NONSPECIFIC PROCEDURE      Child birth x 3     EXCISE MASS NECK  2012    Procedure: EXCISE MASS NECK;  Excision of Left Deep Neck Cyst;  Surgeon: Elmer Hernandez MD;  Location: [facility] OR     HYSTERECTOMY, PAP NO LONGER INDICATED       kidney stone extraction       and      LAPAROSCOPIC ASSISTED HYSTERECTOMY VAGINAL, BILATERAL SALPINGO-OOPHORECTOMY, COMBINED      hysterectomy -  "11/8/2010 - [facility]     MASTECTOMY, BILATERAL           Review of Systems   Musculoskeletal: Positive for joint pain.   All other systems reviewed and are negative.        Physical Exam  /72  Ht 5' 5.5\" (1.664 m)  Wt 213 lb (96.6 kg)  LMP 09/09/2010  BMI 34.91 kg/m2  Constitutional:well-developed, well-nourished, and in no distress.   Cardiovascular: Intact distal pulses.    Neurological: alert. Gait Normal:   Gait, station, stance, and balance appear normal for age  Skin: Skin is warm and dry.   Psychiatric: Mood and affect normal.   Respiratory: unlabored, speaks in full sentences  Lymph: no LAD, no lymphangitis          Right Hand/Wrist Exam   Sensation: Normal    Tenderness   The patient is experiencing tenderness in the palmar aspect of thumb MCP.    Range of Motion   Wrist  Pronation:  Normal  Supination: Normal  Flexion:      Normal  Extension:  Normal  Hand  DIP Thumb: Normal  DIP Index:    Normal  DIP Middle:  Normal  DIP Ring:     Normal  DIP Little:     Normal  MP Thumb:  20  MP Index:     Normal  MP Middle:   Normal  MP Ring:      Normal  MP Little:      Normal  PIP Index:    Normal  PIP Middle:  Normal  PIP Ring:     Normal  PIP Little:     Normal    Muscle Strength   Wrist Extension:   5/5  Wrist Flexion:       5/5  :                      5/5    Tests   Phalen s Sign: n/t  Tinel s Sign (Medial Nerve): n/t  Finkelstein: n/t    Comments:  Triggering noted        Recent Results (from the past 744 hour(s))   XR Finger Right G/E 2 Views    Narrative    XR RIGHT FINGER TWO OR MORE VIEWS   7/2/2018 3:15 PM     HISTORY: Right thumb pain.    COMPARISON: None.      Impression    IMPRESSION: No evidence fracture. Bony alignment within normal limits.  No significant degenerative changes.    DELGADO CONTRERAS MD     ASSESSMENT/PLAN    ICD-10-CM    1. Trigger thumb of right hand M65.311 INJECTION SINGLE TENDON SHEATH/LIGAMENT     METHYLPREDNISOLONE 40 MG INJ     methylPREDNISolone acetate " (DEPO-MEDROL) 40 MG/ML injection     lidocaine 1 % injection       nature of trigger finger pathology discussed. Reviewed options including cortisone injections, hand therapy, or surgical management.    Risks, benefits and complications of trigger finger injection with cortisone discussed with the patient. Common cosmetic effects associated with cortisone noted such as skin hypopigmentation and fragility and subcutaneous atrophy.  Generalized systemic effects of cortisone noted as well, including but not limited to anxiety, agitation, hypertension, hyperglycemia, bone density loss.  She elected to proceed.  Using sterile technique, the area was first prepped with Chloroprep.  A 25 gauge, 1 1/2 inch needle was used to inject 20 mg depomedrol and 1/2cc 1% lidocaine injected at the tendon sheath using the tendon nodule as a landmark. Pt tolerated will. Dressed with a bandaid. Discussed s/s of common adverse effects.  Await 1 week to determine full effect of injection. If recurrent could reinject, try hand therapy, or see hand surgery.      Again, thank you for allowing me to participate in the care of your patient.        Sincerely,        Jose Barrett MD

## 2018-07-05 NOTE — PROGRESS NOTES
[facility]   Clinic Visit s Jul 5, 2018    PCP: Haase, Susan Fanny Rosas is a 52 year old female who is seen in consultation at the request of Dr. Haase for   Chief Complaint   Patient presents with     Right Hand - Pain       Injury: Reports insidious onset without acute precipitating event.     right-hand dominant    Location of Pain: right thumb, metacarpophalangeal, IP joint  Duration of Pain: 4 week(s)  Rating of Pain at worst: 10/10  Rating of Pain Currently: 2/10  Pain is better with: massage, movement  Pain is worse with: at night, mornings  Treatment so far consists of: activity avoidance and  brace  Associated symptoms:  triggering,   Recent imaging completed: X-rays completed 7/2/18.  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     Past Medical History:   Diagnosis Date     Allergic rhinitis, cause unspecified      Anxiety     pt requests relaxant prior to surgery     BENIGN HYPERTENSION 11/2/2004     Calculus of kidney      Elevated rheumatoid factor 12/2/2011     HTN, goal below 140/90 6/13/2013     Infectious mononucleosis      Malignant neoplasm (H)      Migraine 2/20/2012       Patient Active Problem List    Diagnosis Date Noted     Essential hypertension with goal blood pressure less than 140/90 07/01/2016     Priority: Medium     Pulmonary nodules 01/30/2016     Priority: Medium     1/29/2016:  On CT, recommend follow up in 1 year,  Follow up completed in 8/2017 with no changes in the lung nodule.  Recommend 6 month follow up due in 2/2018       Pain of left thumb 01/17/2014     Priority: Medium     S/P abdominal supracervical subtotal hysterectomy 08/20/2013     Priority: Medium     Migraine 02/20/2012     Priority: Medium     Elevated rheumatoid factor 12/02/2011     Priority: Medium     Health Care Home 06/02/2011     Priority: Medium     x  DX V65.8 REPLACED WITH [facility]  "HOME (2013)       Breast cancer (H) 2010     Priority: Medium     CARDIOVASCULAR SCREENING; LDL GOAL LESS THAN 130 10/31/2010     Priority: Medium     Ductal carcinoma in situ of breast 2010     Priority: Medium     Mild major depression (H) 2010     Priority: Medium     Breast cyst 2010     Priority: Medium     High triglycerides 2010     Priority: Medium     Malaise and fatigue 08/15/2006     Priority: Medium     Problem list name updated by automated process. Provider to review         Family History   Problem Relation Age of Onset     Cancer Father      lung CA     Other - See Comments Father       from blood clot     Cancer Brother      squamous cell of the jaw     Hypertension Maternal Grandmother      Cerebrovascular Disease Maternal Grandmother        Social History     Social History     Marital status:      Spouse name: Kurt     Number of children: 3     Years of education: N/A     Occupational History     clerical None      Social History Main Topics     Smoking status: Former Smoker     Types: Cigarettes     Smokeless tobacco: Never Used      Comment: Quit in      Alcohol use No     Drug use: No     Sexual activity: Yes       Past Surgical History:   Procedure Laterality Date     BREAST SURGERY      masectomy-double     C NONSPECIFIC PROCEDURE      Child birth x 3     EXCISE MASS NECK  2012    Procedure: EXCISE MASS NECK;  Excision of Left Deep Neck Cyst;  Surgeon: Elmer Hernandez MD;  Location: [facility] OR     HYSTERECTOMY, PAP NO LONGER INDICATED       kidney stone extraction       and      LAPAROSCOPIC ASSISTED HYSTERECTOMY VAGINAL, BILATERAL SALPINGO-OOPHORECTOMY, COMBINED      hysterectomy - 2010 - [facility]     MASTECTOMY, BILATERAL           Review of Systems   Musculoskeletal: Positive for joint pain.   All other systems reviewed and are negative.        Physical Exam  /72  Ht 5' 5.5\" (1.664 m)  Wt 213 lb (96.6 kg) "  LMP 09/09/2010  BMI 34.91 kg/m2  Constitutional:well-developed, well-nourished, and in no distress.   Cardiovascular: Intact distal pulses.    Neurological: alert. Gait Normal:   Gait, station, stance, and balance appear normal for age  Skin: Skin is warm and dry.   Psychiatric: Mood and affect normal.   Respiratory: unlabored, speaks in full sentences  Lymph: no LAD, no lymphangitis          Right Hand/Wrist Exam   Sensation: Normal    Tenderness   The patient is experiencing tenderness in the palmar aspect of thumb MCP.    Range of Motion   Wrist  Pronation:  Normal  Supination: Normal  Flexion:      Normal  Extension:  Normal  Hand  DIP Thumb: Normal  DIP Index:    Normal  DIP Middle:  Normal  DIP Ring:     Normal  DIP Little:     Normal  MP Thumb:  20  MP Index:     Normal  MP Middle:   Normal  MP Ring:      Normal  MP Little:      Normal  PIP Index:    Normal  PIP Middle:  Normal  PIP Ring:     Normal  PIP Little:     Normal    Muscle Strength   Wrist Extension:   5/5  Wrist Flexion:       5/5  :                      5/5    Tests   Phalen s Sign: n/t  Tinel s Sign (Medial Nerve): n/t  Finkelstein: n/t    Comments:  Triggering noted        Recent Results (from the past 744 hour(s))   XR Finger Right G/E 2 Views    Narrative    XR RIGHT FINGER TWO OR MORE VIEWS   7/2/2018 3:15 PM     HISTORY: Right thumb pain.    COMPARISON: None.      Impression    IMPRESSION: No evidence fracture. Bony alignment within normal limits.  No significant degenerative changes.    DELGADO CONTRERAS MD     ASSESSMENT/PLAN    ICD-10-CM    1. Trigger thumb of right hand M65.311 INJECTION SINGLE TENDON SHEATH/LIGAMENT     METHYLPREDNISOLONE 40 MG INJ     methylPREDNISolone acetate (DEPO-MEDROL) 40 MG/ML injection     lidocaine 1 % injection       nature of trigger finger pathology discussed. Reviewed options including cortisone injections, hand therapy, or surgical management.    Risks, benefits and complications of trigger finger  injection with cortisone discussed with the patient. Common cosmetic effects associated with cortisone noted such as skin hypopigmentation and fragility and subcutaneous atrophy.  Generalized systemic effects of cortisone noted as well, including but not limited to anxiety, agitation, hypertension, hyperglycemia, bone density loss.  She elected to proceed.  Using sterile technique, the area was first prepped with Chloroprep.  A 25 gauge, 1 1/2 inch needle was used to inject 20 mg depomedrol and 1/2cc 1% lidocaine injected at the tendon sheath using the tendon nodule as a landmark. Pt tolerated will. Dressed with a bandaid. Discussed s/s of common adverse effects.  Await 1 week to determine full effect of injection. If recurrent could reinject, try hand therapy, or see hand surgery.

## 2018-08-24 ENCOUNTER — TELEPHONE (OUTPATIENT)
Dept: FAMILY MEDICINE | Facility: CLINIC | Age: 53
End: 2018-08-24

## 2018-08-24 NOTE — TELEPHONE ENCOUNTER
Panel Management Review      Patient has the following on her problem list:     Depression / Dysthymia review    Measure:  Needs PHQ-9 score of 4 or less during index window.  Administer PHQ-9 and if score is 5 or more, send encounter to provider for next steps.    PHQ-9 SCORE 11/18/2016 2/20/2018 6/6/2018   Total Score - - -   Total Score MyChart - - 1 (Minimal depression)   Total Score 2 2 1       If PHQ-9 recheck is 5 or more, route to provider for next steps.    Patient is due for:  None    Hypertension   Last three blood pressure readings:  BP Readings from Last 3 Encounters:   07/05/18 123/72   07/02/18 120/60   06/06/18 136/82     Blood pressure: Passed    HTN Guidelines:  Age 18-59 BP range:  Less than 140/90  Age 60-85 with Diabetes:  Less than 140/90  Age 60-85 without Diabetes:  less than 150/90      Composite cancer screening  Chart review shows that this patient is due/due soon for the following Fecal Colorectal (FIT)  Summary:    Patient is due/failing the following:   FIT    Action needed:   Patient needs referral/order: FIT test    Type of outreach:    routed to panel pool for outreach    Questions for provider review:    None                                                                                                                                    Lilibeth Johnston [facility]       Chart routed to Care Team .

## 2018-09-05 ENCOUNTER — OFFICE VISIT (OUTPATIENT)
Dept: FAMILY MEDICINE | Facility: CLINIC | Age: 53
End: 2018-09-05
Payer: COMMERCIAL

## 2018-09-05 VITALS
RESPIRATION RATE: 12 BRPM | HEART RATE: 79 BPM | SYSTOLIC BLOOD PRESSURE: 134 MMHG | WEIGHT: 198 LBS | DIASTOLIC BLOOD PRESSURE: 88 MMHG | OXYGEN SATURATION: 98 % | TEMPERATURE: 98 F | BODY MASS INDEX: 32.45 KG/M2

## 2018-09-05 DIAGNOSIS — J20.9 ACUTE BRONCHITIS, UNSPECIFIED ORGANISM: ICD-10-CM

## 2018-09-05 DIAGNOSIS — B37.31 YEAST INFECTION OF THE VAGINA: ICD-10-CM

## 2018-09-05 DIAGNOSIS — J01.01 ACUTE RECURRENT MAXILLARY SINUSITIS: Primary | ICD-10-CM

## 2018-09-05 PROCEDURE — 99213 OFFICE O/P EST LOW 20 MIN: CPT | Performed by: NURSE PRACTITIONER

## 2018-09-05 RX ORDER — ALBUTEROL SULFATE 90 UG/1
2 AEROSOL, METERED RESPIRATORY (INHALATION) EVERY 4 HOURS PRN
Qty: 1 INHALER | Refills: 11 | Status: SHIPPED | OUTPATIENT
Start: 2018-09-05 | End: 2019-08-22

## 2018-09-05 RX ORDER — PREDNISONE 20 MG/1
40 TABLET ORAL DAILY
Qty: 10 TABLET | Refills: 0 | Status: SHIPPED | OUTPATIENT
Start: 2018-09-05 | End: 2018-09-10

## 2018-09-05 RX ORDER — FLUCONAZOLE 150 MG/1
TABLET ORAL
Qty: 2 TABLET | Refills: 0 | Status: SHIPPED | OUTPATIENT
Start: 2018-09-05 | End: 2019-05-17

## 2018-09-05 ASSESSMENT — PATIENT HEALTH QUESTIONNAIRE - PHQ9
SUM OF ALL RESPONSES TO PHQ QUESTIONS 1-9: 1
SUM OF ALL RESPONSES TO PHQ QUESTIONS 1-9: 1
10. IF YOU CHECKED OFF ANY PROBLEMS, HOW DIFFICULT HAVE THESE PROBLEMS MADE IT FOR YOU TO DO YOUR WORK, TAKE CARE OF THINGS AT HOME, OR GET ALONG WITH OTHER PEOPLE: NOT DIFFICULT AT ALL

## 2018-09-05 NOTE — MR AVS SNAPSHOT
After Visit Summary   9/5/2018    Alison Barfield    MRN: [id number]           Patient Information     Date Of Birth          1965        Visit Information        Provider Department      9/5/2018 10:30 AM Haase, Susan Rachele, APRN CNP [facility]        Today's Diagnoses     Acute recurrent maxillary sinusitis    -  1    Yeast infection of the vagina        Acute bronchitis, unspecified organism           Follow-ups after your visit        Who to contact     If you have questions or need follow up information about today's clinic visit or your schedule please contact [facility] directly at [phone].  Normal or non-critical lab and imaging results will be communicated to you by MyChart, letter or phone within 4 business days after the clinic has received the results. If you do not hear from us within 7 days, please contact the clinic through [employer] or phone. If you have a critical or abnormal lab result, we will notify you by phone as soon as possible.  Submit refill requests through [employer] or call your pharmacy and they will forward the refill request to us. Please allow 3 business days for your refill to be completed.          Additional Information About Your Visit        MyChart Information     [employer] gives you secure access to your electronic health record. If you see a primary care provider, you can also send messages to your care team and make appointments. If you have questions, please call your primary care clinic.  If you do not have a primary care provider, please call [phone] and they will assist you.        Care EveryWhere ID     This is your Care EveryWhere ID. This could be used by other organizations to access your [place] medical records  [id number]        Your Vitals Were     Pulse Temperature Respirations Last Period Pulse Oximetry Breastfeeding?    79 98  F (36.7  C) (Oral) 12 09/09/2010 98% No    BMI (Body Mass Index)                    32.45 kg/m2            Blood Pressure from Last 3 Encounters:   09/05/18 134/88   07/05/18 123/72   07/02/18 120/60    Weight from Last 3 Encounters:   09/05/18 198 lb (89.8 kg)   07/05/18 213 lb (96.6 kg)   07/02/18 213 lb (96.6 kg)              Today, you had the following     No orders found for display         Today's Medication Changes          These changes are accurate as of 9/5/18 11:04 AM.  If you have any questions, ask your nurse or doctor.               Start taking these medicines.        Dose/Directions    amoxicillin-clavulanate 875-125 MG per tablet   Commonly known as:  AUGMENTIN   Used for:  Acute recurrent maxillary sinusitis   Started by:  Haase, Susan Rachele, APRN CNP        Dose:  1 tablet   Take 1 tablet by mouth 2 times daily   Quantity:  20 tablet   Refills:  0       fluconazole 150 MG tablet   Commonly known as:  DIFLUCAN   Used for:  Yeast infection of the vagina   Started by:  Haase, Susan Rachele, APRN CNP        Take 1 tablet today, repeat in 1 week if needed.   Quantity:  2 tablet   Refills:  0       predniSONE 20 MG tablet   Commonly known as:  DELTASONE   Used for:  Acute recurrent maxillary sinusitis   Started by:  Haase, Susan Rachele, APRN CNP        Dose:  40 mg   Take 2 tablets (40 mg) by mouth daily for 5 days   Quantity:  10 tablet   Refills:  0            Where to get your medicines      These medications were sent to [facility] [address] [place] [address]  [id number] [place] [address]     Phone:  [phone]     albuterol 108 (90 Base) MCG/ACT inhaler    amoxicillin-clavulanate 875-125 MG per tablet    fluconazole 150 MG tablet    predniSONE 20 MG tablet                Primary Care Provider Office Phone # Fax #    Susan Rachele Haase, APRN [phone] [address] [facility] [address]        Equal Access to Services     JAYME IRELAND AH: Pankaj marina  Sogena, wasanfordda luqadaha, qaybta kaaljeff jaffe, darrian jeffersonck ghanshyam. So [facility] [phone].    ATENCIÓN: Si renaldo lameida, tiene a mehta disposición servicios gratuitos de asistencia lingüística. Siri al [phone].    We comply with applicable federal civil rights laws and [place] laws. We do not discriminate on the basis of race, color, national origin, age, disability, sex, sexual orientation, or gender identity.            Thank you!     Thank you for choosing [facility]  for your care. Our goal is always to provide you with excellent care. Hearing back from our patients is one way we can continue to improve our services. Please take a few minutes to complete the written survey that you may receive in the mail after your visit with us. Thank you!             Your Updated Medication List - Protect others around you: Learn how to safely use, store and throw away your medicines at www.disposemymeds.org.          This list is accurate as of 9/5/18 11:04 AM.  Always use your most recent med list.                   Brand Name Dispense Instructions for use Diagnosis    albuterol 108 (90 Base) MCG/ACT inhaler    PROAIR HFA/PROVENTIL HFA/VENTOLIN HFA    1 Inhaler    Inhale 2 puffs into the lungs every 4 hours as needed for shortness of breath / dyspnea or wheezing    Acute bronchitis, unspecified organism       amoxicillin-clavulanate 875-125 MG per tablet    AUGMENTIN    20 tablet    Take 1 tablet by mouth 2 times daily    Acute recurrent maxillary sinusitis       fluconazole 150 MG tablet    DIFLUCAN    2 tablet    Take 1 tablet today, repeat in 1 week if needed.    Yeast infection of the vagina       losartan-hydrochlorothiazide 100-25 MG per tablet    HYZAAR    90 tablet    Take 1 tablet by mouth daily    Essential hypertension with goal blood pressure less than 140/90       metoprolol succinate 25 MG 24 hr tablet    TOPROL-XL    90 tablet    Take 1 tablet (25 mg) by  mouth daily    Essential hypertension with goal blood pressure less than 140/90       predniSONE 20 MG tablet    DELTASONE    10 tablet    Take 2 tablets (40 mg) by mouth daily for 5 days    Acute recurrent maxillary sinusitis       SUMAtriptan 100 MG tablet    IMITREX    6 tablet    Take 1 tablet by mouth. May repeat in 2 hours if needed: max 2/day; average number of headaches monthly 1    Migraine without aura and without status migrainosus, not intractable       TYLENOL PO      Take 1,000 mg by mouth as needed.

## 2018-09-05 NOTE — PROGRESS NOTES
SUBJECTIVE:   Alison Barfield is a 52 year old female who presents to clinic today for the following health issues:    Concern - sinus problem  Onset: 1 week    Description:   Sinus pressure, sore throat and chest congestion    Intensity: moderate, severe at night    Progression of Symptoms:  worsening    Accompanying Signs & Symptoms:  Sore throat , sinus and chest congestion, fever and tinny taste in mouth    Previous history of similar problem:   yes    Precipitating factors:   Worsened by: laying down     Alleviating factors:  Improved by: sleeping sitting up   Therapies Tried and outcome: tylenol , pm and ibuprofen, mucinex helping a little, using inhaler    Symptoms began 1 week ago, initial onset was sinus pressure. It then developed to productive cough with green-yellow colored sputum, sore throat, chest congestion, low-grade fever. She's tried inhaler twice daily, sitting in steam, Tylenol, ibuprofen, Mucinex all with little relief. Denies wheezing.  previously treated for similar symptoms.     Problem list and histories reviewed & adjusted, as indicated.  Additional history: as documented    Patient Active Problem List   Diagnosis     Malaise and fatigue     Mild major depression (H)     Breast cyst     High triglycerides     Ductal carcinoma in situ of breast     CARDIOVASCULAR SCREENING; LDL GOAL LESS THAN 130     Breast cancer (H)     Health Care Home     Elevated rheumatoid factor     Migraine     S/P abdominal supracervical subtotal hysterectomy     Pain of left thumb     Pulmonary nodules     Essential hypertension with goal blood pressure less than 140/90     Past Surgical History:   Procedure Laterality Date     BREAST SURGERY      masectomy-double     C NONSPECIFIC PROCEDURE      Child birth x 3     EXCISE MASS NECK  11/21/2012    Procedure: EXCISE MASS NECK;  Excision of Left Deep Neck Cyst;  Surgeon: Elmer Hernandez MD;  Location: [facility] OR     HYSTERECTOMY, PAP NO LONGER INDICATED        kidney stone extraction       and      LAPAROSCOPIC ASSISTED HYSTERECTOMY VAGINAL, BILATERAL SALPINGO-OOPHORECTOMY, COMBINED      hysterectomy - 2010 - [facility]     MASTECTOMY, BILATERAL         Social History   Substance Use Topics     Smoking status: Former Smoker     Types: Cigarettes     Smokeless tobacco: Never Used      Comment: Quit in      Alcohol use No     Family History   Problem Relation Age of Onset     Cancer Father      lung CA     Other - See Comments Father       from blood clot     Cancer Brother      squamous cell of the jaw     Hypertension Maternal Grandmother      Cerebrovascular Disease Maternal Grandmother          Current Outpatient Prescriptions   Medication Sig Dispense Refill     Acetaminophen (TYLENOL PO) Take 1,000 mg by mouth as needed.       albuterol (PROAIR HFA/PROVENTIL HFA/VENTOLIN HFA) 108 (90 Base) MCG/ACT Inhaler Inhale 2 puffs into the lungs every 4 hours as needed for shortness of breath / dyspnea or wheezing 1 Inhaler 11     losartan-hydrochlorothiazide (HYZAAR) 100-25 MG per tablet Take 1 tablet by mouth daily 90 tablet 3     metoprolol succinate (TOPROL-XL) 25 MG 24 hr tablet Take 1 tablet (25 mg) by mouth daily 90 tablet 3     SUMAtriptan (IMITREX) 100 MG tablet Take 1 tablet by mouth. May repeat in 2 hours if needed: max 2/day; average number of headaches monthly 1 6 tablet 1     Allergies   Allergen Reactions     Cefprozil      Cefzil rash and facial swelling     Erythromycin      GI upset     Sulfa Drugs        Reviewed and updated as needed this visit by clinical staff  Tobacco  Allergies  Meds  Med Hx  Surg Hx  Fam Hx  Soc Hx      Reviewed and updated as needed this visit by Provider         ROS:  Constitutional, HEENT, cardiovascular, pulmonary, psych systems are negative, except as otherwise noted.    This document serves as a record of the services and decisions personally performed and made by Susan Haase, CNP. It was created  on her behalf by Surekha Kim, a trained medical scribe. The creation of this document is based on the provider's statements to the medical scribe.  Surekha Kim 10:55 AM September 5, 2018  OBJECTIVE:   /88 (BP Location: Right arm, Patient Position: Sitting, Cuff Size: Adult Regular)  Pulse 79  Temp 98  F (36.7  C) (Oral)  Resp 12  Wt 89.8 kg (198 lb)  LMP 09/09/2010  SpO2 98%  Breastfeeding? No  BMI 32.45 kg/m2  Body mass index is 32.45 kg/(m^2).  GENERAL: healthy, alert and no distress  HENT: ear canals and TM's normal, nose and mouth without ulcers or lesions. Bilateral nares inflamed, frontal and maxillary sinus pressure.   NECK: no adenopathy, no asymmetry, masses, or scars and thyroid normal to palpation. Tenderness upon palpation, specifically underneath ears  RESP: lungs clear to auscultation - no rales, rhonchi or wheezes  CV: regular rate and rhythm, normal S1 S2, no S3 or S4, no murmur, click or rub, no peripheral edema  PSYCH: mentation appears normal, affect normal/bright    ASSESSMENT/PLAN:   Alison was seen today for sinus problem.    Diagnoses and all orders for this visit:    Acute recurrent maxillary sinusitis: continue increase fluid intake, saline nasal spray.  -     amoxicillin-clavulanate (AUGMENTIN) 875-125 MG per tablet; Take 1 tablet by mouth 2 times daily  -     predniSONE (DELTASONE) 20 MG tablet; Take 2 tablets (40 mg) by mouth daily for 5 days    Yeast infection of the vagina: prescribed to use only if needed, has history of yeast infections with antibiotic use.   -     fluconazole (DIFLUCAN) 150 MG tablet; Take 1 tablet today, repeat in 1 week if needed.    Acute bronchitis, unspecified organism  -     albuterol (PROAIR HFA/PROVENTIL HFA/VENTOLIN HFA) 108 (90 Base) MCG/ACT inhaler; Inhale 2 puffs into the lungs every 4 hours as needed for shortness of breath / dyspnea or wheezing    Follow up in 6 months, sooner as needed.    The information in this document, created by the  medical scribe for me, accurately reflects the services I personally performed and the decisions made by me. I have reviewed and approved this document for accuracy prior to leaving the patient care area.  September 5, 2018 10:58 AM  Susan Haase, APRN [facility]

## 2018-09-06 ASSESSMENT — PATIENT HEALTH QUESTIONNAIRE - PHQ9: SUM OF ALL RESPONSES TO PHQ QUESTIONS 1-9: 1

## 2018-10-01 ENCOUNTER — TELEPHONE (OUTPATIENT)
Dept: FAMILY MEDICINE | Facility: CLINIC | Age: 53
End: 2018-10-01

## 2018-10-01 NOTE — TELEPHONE ENCOUNTER
Panel Management Review      Patient has the following on her problem list:     Depression / Dysthymia review    Measure:  Needs PHQ-9 score of 4 or less during index window.  Administer PHQ-9 and if score is 5 or more, send encounter to provider for next steps.      PHQ-9 SCORE 2/20/2018 6/6/2018 9/5/2018   Total Score - - -   Total Score MyChart - 1 (Minimal depression) 1 (Minimal depression)   Total Score 2 1 1       If PHQ-9 recheck is 5 or more, route to provider for next steps.    Patient is due for:  None    Hypertension   Last three blood pressure readings:  BP Readings from Last 3 Encounters:   09/05/18 134/88   07/05/18 123/72   07/02/18 120/60     Blood pressure: Passed    HTN Guidelines:  Age 18-59 BP range:  Less than 140/90  Age 60-85 with Diabetes:  Less than 140/90  Age 60-85 without Diabetes:  less than 150/90      Composite cancer screening  Chart review shows that this patient is due/due soon for the following Fecal Colorectal (FIT)  Summary:    Patient is due/failing the following:   FIT    Action needed:   Patient needs referral/order: FIT    Type of outreach:    routed to panel pool for outreach    Questions for provider review:    None                                                                                                                                    Lilibeth Johnston       Chart routed to Care Team .

## 2018-10-01 NOTE — LETTER
[facility]  [address] [facility] [address] [phone]  October 16, 2018    Alison Barfield  [address] [address] [facility] [address]    Dear Alison,    I care about your health and have reviewed your health plan. I have reviewed your medical conditions, medication list, and lab results and am making recommendations based on this review, to better manage your health.    You are in particular need of attention regarding:  -Colon Cancer Screening    I am recommending that you:  {recommendations:-schedule a COLONOSCOPY to look for colon cancer (due every 10 years or 5 years in higher risk situations.)   Colon cancer is now the second leading cause of death in the [place] for both men and women and there are over 130,000 new cases and 50,000 deaths per year from colon cancer.  Colonoscopies can prevent 90-95% of these deaths.  Problem lesions can be removed before they ever become cancer.  This test is not only looking for cancer, but also getting rid of precancerious lesions.  If you do not wish to do a colonoscopy or cannot afford to do one, at this time, there is another option. It is called a FIT test or Fecal Immunochemical Occult Blood Test (take home stool sample kit).  It does not replace the colonoscopy for colorectal cancer screening, but it can detect hidden bleeding in the lower colon.  It does need to be repeated every year and if a positive result is obtained, you would be referred for a colonoscopy.  If you have completed either one of these tests at another facility, please have the records sent to our clinic so that we can best coordinate your care.    Here is a list of Health Maintenance topics that are due now or due soon:  Health Maintenance Due   Topic Date Due     FIT Q1 YR  09/30/1975     HIV SCREEN (SYSTEM ASSIGNED)  09/30/1983     HEPATITIS C SCREENING  09/30/1983     WELLNESS VISIT Q1 YR  04/03/2016     INFLUENZA  VACCINE (1) 09/01/2018       Please call us at [phone] (or use [employer]) to address the above recommendations.     Thank you for trusting [facility] and we appreciate the opportunity to serve you.  We look forward to supporting your healthcare needs in the future.    Healthy Regards,    Susan Haase CNP/tpd

## 2018-10-08 NOTE — TELEPHONE ENCOUNTER
Type of outreach:  Phone, left message for patient to call back.     Dottie Fernandes MA on 10/8/2018 at 2:07 PM

## 2018-10-16 NOTE — TELEPHONE ENCOUNTER
Type of outreach:  Phone, left message for patient to call back.  and Sent letter.    Dottie Fernandes MA on 10/16/2018 at 2:39 PM

## 2019-03-27 ENCOUNTER — TELEPHONE (OUTPATIENT)
Dept: FAMILY MEDICINE | Facility: CLINIC | Age: 54
End: 2019-03-27

## 2019-03-27 NOTE — TELEPHONE ENCOUNTER
Panel Management Review      Patient has the following on her problem list:     Hypertension   Last three blood pressure readings:  BP Readings from Last 3 Encounters:   09/05/18 134/88   07/05/18 123/72   07/02/18 120/60     Blood pressure: Passed    HTN Guidelines:  Age 18-59 BP range:  Less than 140/90  Age 60-85 with Diabetes:  Less than 140/90  Age 60-85 without Diabetes:  less than 150/90      Composite cancer screening  Chart review shows that this patient is due/due soon for the following Fecal Colorectal (FIT)  Summary:    Patient is due/failing the following:   BP CHECK, PHQ9 and PHYSICAL    Action needed:   Patient needs office visit for physical with blood pressure and depression check. and Patient needs referral/order: FIT    Type of outreach:    routed to panel pool for outreach    Questions for provider review:    None                                                                                                                                    Lilibeth Johnston       Chart routed to Care Team .

## 2019-04-01 ENCOUNTER — TELEPHONE (OUTPATIENT)
Dept: FAMILY MEDICINE | Facility: CLINIC | Age: 54
End: 2019-04-01

## 2019-04-01 NOTE — TELEPHONE ENCOUNTER
Pt calls, pt wonders if has influenza, 102 2 days ago, glands swollen, fatigues, stuffy and congestion, chills, nasal congestion, denies cough or respiratory symptoms, has appointment in am, using nasal spray and OTC ibuprofen etc, stable, discussed viral vs bacterial, appointment if breathing, not doing well, may cancel if feels improving with OTC treatments, pt agrees    Jenae Silva RN, BSN  Message handled by Nurse Triage.

## 2019-04-02 ENCOUNTER — OFFICE VISIT (OUTPATIENT)
Dept: FAMILY MEDICINE | Facility: CLINIC | Age: 54
End: 2019-04-02
Payer: COMMERCIAL

## 2019-04-02 VITALS
HEART RATE: 104 BPM | RESPIRATION RATE: 16 BRPM | TEMPERATURE: 98.2 F | DIASTOLIC BLOOD PRESSURE: 82 MMHG | SYSTOLIC BLOOD PRESSURE: 136 MMHG | OXYGEN SATURATION: 97 % | BODY MASS INDEX: 35.23 KG/M2 | WEIGHT: 215 LBS

## 2019-04-02 DIAGNOSIS — J01.01 ACUTE RECURRENT MAXILLARY SINUSITIS: ICD-10-CM

## 2019-04-02 DIAGNOSIS — R50.9 FEVER, UNSPECIFIED FEVER CAUSE: Primary | ICD-10-CM

## 2019-04-02 LAB
FLUAV+FLUBV AG SPEC QL: NEGATIVE
FLUAV+FLUBV AG SPEC QL: NEGATIVE
SPECIMEN SOURCE: NORMAL

## 2019-04-02 PROCEDURE — 99213 OFFICE O/P EST LOW 20 MIN: CPT | Performed by: PHYSICIAN ASSISTANT

## 2019-04-02 PROCEDURE — 87804 INFLUENZA ASSAY W/OPTIC: CPT | Performed by: PHYSICIAN ASSISTANT

## 2019-04-02 RX ORDER — FLUCONAZOLE 150 MG/1
150 TABLET ORAL ONCE
Qty: 2 TABLET | Refills: 0 | Status: SHIPPED | OUTPATIENT
Start: 2019-04-02 | End: 2019-05-17

## 2019-04-02 NOTE — PATIENT INSTRUCTIONS
Take the complete course of the antibiotic.    Follow-up if not improving in 5 days or sooner if worsening.        Patient Education     Sinusitis (Antibiotic Treatment)    The sinuses are air-filled spaces within the bones of the face. They connect to the inside of the nose. Sinusitis is an inflammation of the tissue that lines the sinuses. Sinusitis can occur during a cold. It can also happen due to allergies to pollens and other particles in the air. Sinusitis can cause symptoms of sinus congestion and a feeling of fullness. A sinus infection causes fever, headache, and facial pain. There is often green or yellow fluid draining from the nose or into the back of the throat (post-nasal drip). You have been given antibiotics to treat this condition.  Home care    Take the full course of antibiotics as instructed. Do not stop taking them, even when you feel better.    Drink plenty of water, hot tea, and other liquids. This may help thin nasal mucus. It also may help your sinuses drain fluids.    Heat may help soothe painful areas of your face. Use a towel soaked in hot water. Or,  the shower and direct the warm spray onto your face. Using a vaporizer along with a menthol rub at night may also help soothe symptoms.     An expectorant with guaifenesin may help thin nasal mucus and help your sinuses drain fluids.    You can use an over-the-counter decongestant, unless a similar medicine was prescribed to you. Nasal sprays work the fastest. Use one that contains phenylephrine or oxymetazoline. First blow your nose gently. Then use the spray. Do not use these medicines more often than directed on the label. If you do, your symptoms may get worse. You may also take pills that contain pseudoephedrine. Don t use products that combine multiple medicines. This is because side effects may be increased. Read labels. You can also ask the pharmacist for help. (People with high blood pressure should not use decongestants.  They can raise blood pressure.)    Over-the-counter antihistamines may help if allergies contributed to your sinusitis.      Do not use nasal rinses or irrigation during an acute sinus infection, unless your healthcare provider tells you to. Rinsing may spread the infection to other areas in your sinuses.    Use acetaminophen or ibuprofen to control pain, unless another pain medicine was prescribed to you. If you have chronic liver or kidney disease or ever had a stomach ulcer, talk with your healthcare provider before using these medicines. (Aspirin should never be taken by anyone under age 18 who is ill with a fever. It may cause severe liver damage.)    Don't smoke. This can make symptoms worse.  Follow-up care  Follow up with your healthcare provider or our staff if you are better in 1 week.  When to seek medical advice  Call your healthcare provider if any of these occur:    Facial pain or headache that gets worse    Stiff neck    Unusual drowsiness or confusion    Swelling of your forehead or eyelids    Vision problems, such as blurred or double vision    Fever of 100.4 F (38 C) or higher, or as directed by your healthcare provider    Seizure    Breathing problems    Symptoms don't go away in 10 days  Prevention  Here are steps you can take to help prevent an infection:    Keep good hand washing habits.    Don t have close contact with people who have sore throats, colds, or other upper respiratory infections.    Don t smoke, and stay away from secondhand smoke.    Stay up to date with of your vaccines.  Date Last Reviewed: 11/1/2017 2000-2018 [employer]. [address] [place] [address], [place] [address]. All rights reserved. This information is not intended as a substitute for professional medical care. Always follow your healthcare professional's instructions.

## 2019-04-02 NOTE — LETTER
April 2, 2019      lAison Barfield  [address] [facility] [address]        To Whom It May Concern:    Alison Barfield  was seen on 4/2/2019.  Please excuse her  until 4/4/19 due to illness.        Sincerely,        Romel Pagan PA-C

## 2019-04-02 NOTE — PROGRESS NOTES
SUBJECTIVE:   Alison Barfield is a 53 year old female who presents to clinic today for the following health issues:      Acute Illness   Acute illness concerns: Sinus and fever   Onset: 4 days     Fever: YES up to 102    Chills/Sweats: YES    Headache (location?): YES    Sinus Pressure:YES    Conjunctivitis:  no    Ear Pain: YES: right    Rhinorrhea: YES    Congestion: no     Sore Throat: YES    Neck and gland swelling. She did get a flu shot.     Cough: YES-productive of yellow sputum    Wheeze: no     Decreased Appetite: no    Nausea: YES    Vomiting: YES    Diarrhea:  no     Dysuria/Freq.: no     Fatigue/Achiness: YES    Sick/Strep Exposure: YES- Mono and possibly influenza     Therapies Tried and outcome: Ibuprofen and tylenol       Problem list and histories reviewed & adjusted, as indicated.  Additional history: as documented    Patient Active Problem List   Diagnosis     Malaise and fatigue     Mild major depression (H)     Breast cyst     High triglycerides     Ductal carcinoma in situ of breast     CARDIOVASCULAR SCREENING; LDL GOAL LESS THAN 130     Breast cancer (H)     Health Care Home     Elevated rheumatoid factor     Migraine     S/P abdominal supracervical subtotal hysterectomy     Pain of left thumb     Pulmonary nodules     Essential hypertension with goal blood pressure less than 140/90     Past Surgical History:   Procedure Laterality Date     BREAST SURGERY      masectomy-double     C NONSPECIFIC PROCEDURE      Child birth x 3     EXCISE MASS NECK  11/21/2012    Procedure: EXCISE MASS NECK;  Excision of Left Deep Neck Cyst;  Surgeon: Elmer Hernandez MD;  Location: [facility] OR     HYSTERECTOMY, PAP NO LONGER INDICATED       kidney stone extraction      1993 and 1994     LAPAROSCOPIC ASSISTED HYSTERECTOMY VAGINAL, BILATERAL SALPINGO-OOPHORECTOMY, COMBINED      hysterectomy - 11/8/2010 - [facility]     MASTECTOMY, BILATERAL         Social History     Tobacco Use     Smoking status: Former  Smoker     Types: Cigarettes     Smokeless tobacco: Never Used     Tobacco comment: Quit in    Substance Use Topics     Alcohol use: No     Family History   Problem Relation Age of Onset     Cancer Father         lung CA     Other - See Comments Father          from blood clot     Cancer Brother         squamous cell of the jaw     Hypertension Maternal Grandmother      Cerebrovascular Disease Maternal Grandmother          Current Outpatient Medications   Medication Sig Dispense Refill     Acetaminophen (TYLENOL PO) Take 1,000 mg by mouth as needed.       albuterol (PROAIR HFA/PROVENTIL HFA/VENTOLIN HFA) 108 (90 Base) MCG/ACT inhaler Inhale 2 puffs into the lungs every 4 hours as needed for shortness of breath / dyspnea or wheezing 1 Inhaler 11     amoxicillin-clavulanate (AUGMENTIN) 875-125 MG per tablet Take 1 tablet by mouth 2 times daily 20 tablet 0     fluconazole (DIFLUCAN) 150 MG tablet Take 1 tablet today, repeat in 1 week if needed. 2 tablet 0     losartan-hydrochlorothiazide (HYZAAR) 100-25 MG per tablet Take 1 tablet by mouth daily 90 tablet 3     metoprolol succinate (TOPROL-XL) 25 MG 24 hr tablet Take 1 tablet (25 mg) by mouth daily 90 tablet 3     SUMAtriptan (IMITREX) 100 MG tablet Take 1 tablet by mouth. May repeat in 2 hours if needed: max 2/day; average number of headaches monthly 1 6 tablet 1     Allergies   Allergen Reactions     Cefprozil      Cefzil rash and facial swelling     Erythromycin      GI upset     Sulfa Drugs        Reviewed and updated as needed this visit by clinical staff       Reviewed and updated as needed this visit by Provider         ROS:  Constitutional, HEENT, cardiovascular, pulmonary, gi and gu systems are negative, except as otherwise noted.    OBJECTIVE:     /82 (BP Location: Right arm, Patient Position: Chair, Cuff Size: Adult Regular)   Pulse 104   Temp 98.2  F (36.8  C) (Oral)   Resp 16   Wt 97.5 kg (215 lb)   LMP 2010   SpO2 97%   BMI  35.23 kg/m    Body mass index is 35.23 kg/m .  GENERAL: healthy, alert and no distress  EYES: Eyes grossly normal to inspection, PERRL and conjunctivae and sclerae normal  HENT: ear canals and TM's normal, nose and mouth without ulcers or lesions  RESP: lungs clear to auscultation - no rales, rhonchi or wheezes  CV: regular rate and rhythm, normal S1 S2, no S3 or S4, no murmur, click or rub, no peripheral edema and peripheral pulses strong  MS: no gross musculoskeletal defects noted, no edema  SKIN: no suspicious lesions or rashes  NEURO: Normal strength and tone, mentation intact and speech normal  PSYCH: mentation appears normal, affect normal/bright  LYMPH: anterior cervical: enlarged tender nodes    Diagnostic Test Results:  Results for orders placed or performed in visit on 04/02/19 (from the past 24 hour(s))   Influenza A/B antigen   Result Value Ref Range    Influenza A/B Agn Specimen Nasal     Influenza A Negative NEG^Negative    Influenza B Negative NEG^Negative       ASSESSMENT/PLAN:       (R50.9) Fever, unspecified fever cause  (primary encounter diagnosis)    Comment: Influenza test is negative. Cannot completely rule out influenza as cause. Patient also worried about mono. Offered testing but also informed her that it is not very accurate early in illness. She will wait and if not improving, return for possible testing.    Plan: Influenza A/B antigen            (J01.01) Acute recurrent maxillary sinusitis    Comment: Will treat with antibiotic since patient has history of sinus infections and pneumonia and current fever. Discussed could also still be viral.    Plan: amoxicillin-clavulanate (AUGMENTIN) 875-125 MG         tablet, fluconazole (DIFLUCAN) 150 MG tablet              Patient Instructions   Take the complete course of the antibiotic.    Follow-up if not improving in 5 days or sooner if worsening.        Patient Education     Sinusitis (Antibiotic Treatment)    The sinuses are air-filled spaces  within the bones of the face. They connect to the inside of the nose. Sinusitis is an inflammation of the tissue that lines the sinuses. Sinusitis can occur during a cold. It can also happen due to allergies to pollens and other particles in the air. Sinusitis can cause symptoms of sinus congestion and a feeling of fullness. A sinus infection causes fever, headache, and facial pain. There is often green or yellow fluid draining from the nose or into the back of the throat (post-nasal drip). You have been given antibiotics to treat this condition.  Home care    Take the full course of antibiotics as instructed. Do not stop taking them, even when you feel better.    Drink plenty of water, hot tea, and other liquids. This may help thin nasal mucus. It also may help your sinuses drain fluids.    Heat may help soothe painful areas of your face. Use a towel soaked in hot water. Or,  the shower and direct the warm spray onto your face. Using a vaporizer along with a menthol rub at night may also help soothe symptoms.     An expectorant with guaifenesin may help thin nasal mucus and help your sinuses drain fluids.    You can use an over-the-counter decongestant, unless a similar medicine was prescribed to you. Nasal sprays work the fastest. Use one that contains phenylephrine or oxymetazoline. First blow your nose gently. Then use the spray. Do not use these medicines more often than directed on the label. If you do, your symptoms may get worse. You may also take pills that contain pseudoephedrine. Don t use products that combine multiple medicines. This is because side effects may be increased. Read labels. You can also ask the pharmacist for help. (People with high blood pressure should not use decongestants. They can raise blood pressure.)    Over-the-counter antihistamines may help if allergies contributed to your sinusitis.      Do not use nasal rinses or irrigation during an acute sinus infection, unless your  healthcare provider tells you to. Rinsing may spread the infection to other areas in your sinuses.    Use acetaminophen or ibuprofen to control pain, unless another pain medicine was prescribed to you. If you have chronic liver or kidney disease or ever had a stomach ulcer, talk with your healthcare provider before using these medicines. (Aspirin should never be taken by anyone under age 18 who is ill with a fever. It may cause severe liver damage.)    Don't smoke. This can make symptoms worse.  Follow-up care  Follow up with your healthcare provider or our staff if you are better in 1 week.  When to seek medical advice  Call your healthcare provider if any of these occur:    Facial pain or headache that gets worse    Stiff neck    Unusual drowsiness or confusion    Swelling of your forehead or eyelids    Vision problems, such as blurred or double vision    Fever of 100.4 F (38 C) or higher, or as directed by your healthcare provider    Seizure    Breathing problems    Symptoms don't go away in 10 days  Prevention  Here are steps you can take to help prevent an infection:    Keep good hand washing habits.    Don t have close contact with people who have sore throats, colds, or other upper respiratory infections.    Don t smoke, and stay away from secondhand smoke.    Stay up to date with of your vaccines.  Date Last Reviewed: 11/1/2017 2000-2018 [employer]. [address] [place] [address], [place] [address]. All rights reserved. This information is not intended as a substitute for professional medical care. Always follow your healthcare professional's instructions.               Romel Pagan PA-C  [facility]

## 2019-05-17 ENCOUNTER — OFFICE VISIT (OUTPATIENT)
Dept: FAMILY MEDICINE | Facility: CLINIC | Age: 54
End: 2019-05-17
Payer: COMMERCIAL

## 2019-05-17 VITALS
RESPIRATION RATE: 14 BRPM | DIASTOLIC BLOOD PRESSURE: 76 MMHG | WEIGHT: 206 LBS | OXYGEN SATURATION: 97 % | SYSTOLIC BLOOD PRESSURE: 136 MMHG | HEART RATE: 66 BPM | TEMPERATURE: 98 F | BODY MASS INDEX: 33.76 KG/M2

## 2019-05-17 DIAGNOSIS — J01.00 ACUTE MAXILLARY SINUSITIS, RECURRENCE NOT SPECIFIED: ICD-10-CM

## 2019-05-17 DIAGNOSIS — B37.31 YEAST INFECTION OF THE VAGINA: ICD-10-CM

## 2019-05-17 DIAGNOSIS — J02.9 SORE THROAT: Primary | ICD-10-CM

## 2019-05-17 LAB
DEPRECATED S PYO AG THROAT QL EIA: NORMAL
SPECIMEN SOURCE: NORMAL

## 2019-05-17 PROCEDURE — 99213 OFFICE O/P EST LOW 20 MIN: CPT | Performed by: PHYSICIAN ASSISTANT

## 2019-05-17 PROCEDURE — 87880 STREP A ASSAY W/OPTIC: CPT | Performed by: PHYSICIAN ASSISTANT

## 2019-05-17 PROCEDURE — 87081 CULTURE SCREEN ONLY: CPT | Performed by: PHYSICIAN ASSISTANT

## 2019-05-17 RX ORDER — FLUCONAZOLE 150 MG/1
TABLET ORAL
Qty: 2 TABLET | Refills: 0 | Status: SHIPPED | OUTPATIENT
Start: 2019-05-17 | End: 2019-08-22

## 2019-05-17 RX ORDER — LEVOFLOXACIN 500 MG/1
500 TABLET, FILM COATED ORAL DAILY
Qty: 10 TABLET | Refills: 0 | Status: SHIPPED | OUTPATIENT
Start: 2019-05-17 | End: 2019-08-22

## 2019-05-17 NOTE — PROGRESS NOTES
SUBJECTIVE:   Alison Barfield is a 53 year old female who presents to clinic today for the following   health issues:      Acute Illness   Acute illness concerns: Sawollen glands, sore throat  Onset: over a week    Fever: YES- Saturday and sunday    Chills/Sweats: no    Headache (location?): YES    Sinus Pressure:YES    Conjunctivitis:  no    Ear Pain: YES- some alternates side depending on sleep    Rhinorrhea: no     Congestion: YES    Sore Throat: YES- feels swollen and worse since last night with painful swallowing     Cough: YES-non-productive    Wheeze: no     Decreased Appetite: YES    Nausea: YES- Monday-tuesday    Vomiting: YES    Diarrhea:  no    Dysuria/Freq.: YES    Fatigue/Achiness: YES- tired    Sick/Strep Exposure: YES-  has had a deep cough     Therapies Tried and outcome: Tylenol, musinex, nasal spray.       Additional history: as documented    Reviewed  and updated as needed this visit by clinical staff         Reviewed and updated as needed this visit by Provider         Patient Active Problem List   Diagnosis     Malaise and fatigue     Mild major depression (H)     Breast cyst     High triglycerides     Ductal carcinoma in situ of breast     CARDIOVASCULAR SCREENING; LDL GOAL LESS THAN 130     Breast cancer (H)     Health Care Home     Elevated rheumatoid factor     Migraine     S/P abdominal supracervical subtotal hysterectomy     Pain of left thumb     Pulmonary nodules     Essential hypertension with goal blood pressure less than 140/90     Past Surgical History:   Procedure Laterality Date     BREAST SURGERY      masectomy-double     C NONSPECIFIC PROCEDURE      Child birth x 3     EXCISE MASS NECK  11/21/2012    Procedure: EXCISE MASS NECK;  Excision of Left Deep Neck Cyst;  Surgeon: Elmer Hernandez MD;  Location: [facility] OR     HYSTERECTOMY, PAP NO LONGER INDICATED       kidney stone extraction      1993 and 1994     LAPAROSCOPIC ASSISTED HYSTERECTOMY VAGINAL, BILATERAL  SALPINGO-OOPHORECTOMY, COMBINED      hysterectomy - 2010 - [facility]     MASTECTOMY, BILATERAL         Social History     Tobacco Use     Smoking status: Former Smoker     Types: Cigarettes     Smokeless tobacco: Never Used     Tobacco comment: Quit in    Substance Use Topics     Alcohol use: No     Comment: none     Family History   Problem Relation Age of Onset     Cancer Father         lung CA     Other - See Comments Father          from blood clot     Cancer Brother         squamous cell of the jaw     Hypertension Maternal Grandmother      Cerebrovascular Disease Maternal Grandmother          Current Outpatient Medications   Medication Sig Dispense Refill     Acetaminophen (TYLENOL PO) Take 1,000 mg by mouth as needed.       albuterol (PROAIR HFA/PROVENTIL HFA/VENTOLIN HFA) 108 (90 Base) MCG/ACT inhaler Inhale 2 puffs into the lungs every 4 hours as needed for shortness of breath / dyspnea or wheezing 1 Inhaler 11     losartan-hydrochlorothiazide (HYZAAR) 100-25 MG per tablet Take 1 tablet by mouth daily 90 tablet 3     metoprolol succinate (TOPROL-XL) 25 MG 24 hr tablet Take 1 tablet (25 mg) by mouth daily 90 tablet 3     SUMAtriptan (IMITREX) 100 MG tablet Take 1 tablet by mouth. May repeat in 2 hours if needed: max 2/day; average number of headaches monthly 1 6 tablet 1     fluconazole (DIFLUCAN) 150 MG tablet Take 1 tablet today, repeat in 1 week if needed. (Patient not taking: Reported on 2019) 2 tablet 0     Allergies   Allergen Reactions     Cefprozil      Cefzil rash and facial swelling     Erythromycin      GI upset     Sulfa Drugs        ROS:  Constitutional, HEENT, cardiovascular, pulmonary, gi and gu systems are negative, except as otherwise noted.    OBJECTIVE:     /76 (BP Location: Right arm, Patient Position: Chair, Cuff Size: Adult Large)   Pulse 66   Temp 98  F (36.7  C) (Oral)   Resp 14   Wt 93.4 kg (206 lb)   LMP 2010   SpO2 97%   Breastfeeding?  No   BMI 33.76 kg/m    Body mass index is 33.76 kg/m .  GENERAL: healthy, alert and no distress  EYES: Eyes grossly normal to inspection, PERRL and conjunctivae and sclerae normal  HENT: ear canals and TM's normal, nose and mouth without ulcers or lesions  RESP: lungs clear to auscultation - no rales, rhonchi or wheezes  CV: regular rate and rhythm, normal S1 S2, no S3 or S4, no murmur, click or rub, no peripheral edema and peripheral pulses strong  MS: no gross musculoskeletal defects noted, no edema  SKIN: no suspicious lesions or rashes  NEURO: Normal strength and tone, mentation intact and speech normal  PSYCH: mentation appears normal, affect normal/bright  LYMPH: anterior cervical: enlarged tender nodes    Diagnostic Test Results:  Results for orders placed or performed in visit on 05/17/19 (from the past 24 hour(s))   Rapid strep screen   Result Value Ref Range    Specimen Description Throat     Rapid Strep A Screen       NEGATIVE: No Group A streptococcal antigen detected by immunoassay, await culture report.       ASSESSMENT/PLAN:       (J02.9) Sore throat  (primary encounter diagnosis)    Comment: Rapid strep is negative. Treating for sinusitis regardless.    Plan: Rapid strep screen, Beta strep group A culture            (J01.00) Acute maxillary sinusitis, recurrence not specified    Comment: Treat with Levaquin. Patient states that primary care provider usually switches between augmentin and levaquin. Last treated with augmentin by me 6 weeks ago.    Plan: levofloxacin (LEVAQUIN) 500 MG tablet            (B37.3) Yeast infection of the vagina  Comment:   Plan: fluconazole (DIFLUCAN) 150 MG tablet              Patient Instructions     Patient Education     Sinusitis (Antibiotic Treatment)    The sinuses are air-filled spaces within the bones of the face. They connect to the inside of the nose. Sinusitis is an inflammation of the tissue that lines the sinuses. Sinusitis can occur during a cold. It can also  happen due to allergies to pollens and other particles in the air. Sinusitis can cause symptoms of sinus congestion and a feeling of fullness. A sinus infection causes fever, headache, and facial pain. There is often green or yellow fluid draining from the nose or into the back of the throat (post-nasal drip). You have been given antibiotics to treat this condition.  Home care    Take the full course of antibiotics as instructed. Do not stop taking them, even when you feel better.    Drink plenty of water, hot tea, and other liquids. This may help thin nasal mucus. It also may help your sinuses drain fluids.    Heat may help soothe painful areas of your face. Use a towel soaked in hot water. Or,  the shower and direct the warm spray onto your face. Using a vaporizer along with a menthol rub at night may also help soothe symptoms.     An expectorant with guaifenesin may help thin nasal mucus and help your sinuses drain fluids.    You can use an over-the-counter decongestant, unless a similar medicine was prescribed to you. Nasal sprays work the fastest. Use one that contains phenylephrine or oxymetazoline. First blow your nose gently. Then use the spray. Do not use these medicines more often than directed on the label. If you do, your symptoms may get worse. You may also take pills that contain pseudoephedrine. Don t use products that combine multiple medicines. This is because side effects may be increased. Read labels. You can also ask the pharmacist for help. (People with high blood pressure should not use decongestants. They can raise blood pressure.)    Over-the-counter antihistamines may help if allergies contributed to your sinusitis.      Do not use nasal rinses or irrigation during an acute sinus infection, unless your healthcare provider tells you to. Rinsing may spread the infection to other areas in your sinuses.    Use acetaminophen or ibuprofen to control pain, unless another pain medicine was  prescribed to you. If you have chronic liver or kidney disease or ever had a stomach ulcer, talk with your healthcare provider before using these medicines. (Aspirin should never be taken by anyone under age 18 who is ill with a fever. It may cause severe liver damage.)    Don't smoke. This can make symptoms worse.  Follow-up care  Follow up with your healthcare provider or our staff if you are not better in 1 week.  When to seek medical advice  Call your healthcare provider if any of these occur:    Facial pain or headache that gets worse    Stiff neck    Unusual drowsiness or confusion    Swelling of your forehead or eyelids    Vision problems, such as blurred or double vision    Fever of 100.4 F (38 C) or higher, or as directed by your healthcare provider    Seizure    Breathing problems    Symptoms don't go away in 10 days  Prevention  Here are steps you can take to help prevent an infection:    Keep good hand washing habits.    Don t have close contact with people who have sore throats, colds, or other upper respiratory infections.    Don t smoke, and stay away from secondhand smoke.    Stay up to date with of your vaccines.  Date Last Reviewed: 11/1/2017 2000-2018 [employer]. [address] [place] [address], [place] [address]. All rights reserved. This information is not intended as a substitute for professional medical care. Always follow your healthcare professional's instructions.               Romel Pagan PA-C  [facility]

## 2019-05-17 NOTE — PATIENT INSTRUCTIONS
Patient Education     Sinusitis (Antibiotic Treatment)    The sinuses are air-filled spaces within the bones of the face. They connect to the inside of the nose. Sinusitis is an inflammation of the tissue that lines the sinuses. Sinusitis can occur during a cold. It can also happen due to allergies to pollens and other particles in the air. Sinusitis can cause symptoms of sinus congestion and a feeling of fullness. A sinus infection causes fever, headache, and facial pain. There is often green or yellow fluid draining from the nose or into the back of the throat (post-nasal drip). You have been given antibiotics to treat this condition.  Home care    Take the full course of antibiotics as instructed. Do not stop taking them, even when you feel better.    Drink plenty of water, hot tea, and other liquids. This may help thin nasal mucus. It also may help your sinuses drain fluids.    Heat may help soothe painful areas of your face. Use a towel soaked in hot water. Or,  the shower and direct the warm spray onto your face. Using a vaporizer along with a menthol rub at night may also help soothe symptoms.     An expectorant with guaifenesin may help thin nasal mucus and help your sinuses drain fluids.    You can use an over-the-counter decongestant, unless a similar medicine was prescribed to you. Nasal sprays work the fastest. Use one that contains phenylephrine or oxymetazoline. First blow your nose gently. Then use the spray. Do not use these medicines more often than directed on the label. If you do, your symptoms may get worse. You may also take pills that contain pseudoephedrine. Don t use products that combine multiple medicines. This is because side effects may be increased. Read labels. You can also ask the pharmacist for help. (People with high blood pressure should not use decongestants. They can raise blood pressure.)    Over-the-counter antihistamines may help if allergies contributed to your  sinusitis.      Do not use nasal rinses or irrigation during an acute sinus infection, unless your healthcare provider tells you to. Rinsing may spread the infection to other areas in your sinuses.    Use acetaminophen or ibuprofen to control pain, unless another pain medicine was prescribed to you. If you have chronic liver or kidney disease or ever had a stomach ulcer, talk with your healthcare provider before using these medicines. (Aspirin should never be taken by anyone under age 18 who is ill with a fever. It may cause severe liver damage.)    Don't smoke. This can make symptoms worse.  Follow-up care  Follow up with your healthcare provider or our staff if you are not better in 1 week.  When to seek medical advice  Call your healthcare provider if any of these occur:    Facial pain or headache that gets worse    Stiff neck    Unusual drowsiness or confusion    Swelling of your forehead or eyelids    Vision problems, such as blurred or double vision    Fever of 100.4 F (38 C) or higher, or as directed by your healthcare provider    Seizure    Breathing problems    Symptoms don't go away in 10 days  Prevention  Here are steps you can take to help prevent an infection:    Keep good hand washing habits.    Don t have close contact with people who have sore throats, colds, or other upper respiratory infections.    Don t smoke, and stay away from secondhand smoke.    Stay up to date with of your vaccines.  Date Last Reviewed: 11/1/2017 2000-2018 [employer]. [address] [place] [address], [place] [address]. All rights reserved. This information is not intended as a substitute for professional medical care. Always follow your healthcare professional's instructions.

## 2019-05-17 NOTE — LETTER
May 17, 2019      Alison Barfield  [address] [place] [id number]        To Whom It May Concern:    Alison Barfield  was seen on 5/17/2019.  Please excuse her  until 5/20/19 due to illness.        Sincerely,        Romel Pagan PA-C

## 2019-05-18 LAB
BACTERIA SPEC CULT: NORMAL
SPECIMEN SOURCE: NORMAL

## 2019-07-06 DIAGNOSIS — I10 ESSENTIAL HYPERTENSION WITH GOAL BLOOD PRESSURE LESS THAN 140/90: ICD-10-CM

## 2019-07-07 NOTE — TELEPHONE ENCOUNTER
"Medication(s) may not be due for refill  Requested Prescriptions   Pending Prescriptions Disp Refills     losartan-hydrochlorothiazide (HYZAAR) 100-25 MG tablet [Pharmacy Med Name: LOSARTAN/HCTZ 100/25MG TABLETS]  Last Written Prescription Date:  7/5/2019  Last Fill Quantity: 30 tablet,  # refills: 0   Last office visit: 5/17/2019 with prescribing provider:  Latasha   Future Office Visit:     30 tablet 0     Sig: TAKE 1 TABLET BY MOUTH DAILY       Angiotensin-II Receptors Failed - 7/6/2019  3:26 PM        Failed - Normal serum creatinine on file in past 12 months     Recent Labs   Lab Test 06/06/18  1139   CR 0.62             Failed - Normal serum potassium on file in past 12 months     Recent Labs   Lab Test 06/06/18  1139   POTASSIUM 3.4                    Passed - Blood pressure under 140/90 in past 12 months     BP Readings from Last 3 Encounters:   05/17/19 136/76   04/02/19 136/82   09/05/18 134/88                 Passed - Recent (12 mo) or future (30 days) visit within the authorizing provider's specialty     Patient had office visit in the last 12 months or has a visit in the next 30 days with authorizing provider or within the authorizing provider's specialty.  See \"Patient Info\" tab in inbasket, or \"Choose Columns\" in Meds & Orders section of the refill encounter.              Passed - Medication is active on med list        Passed - Patient is age 18 or older        Passed - No active pregnancy on record        Passed - No positive pregnancy test in past 12 months        metoprolol succinate ER (TOPROL-XL) 25 MG 24 hr tablet [Pharmacy Med Name: METOPROLOL ER SUCCINATE 25MG TABS]  Last Written Prescription Date:  7/5/2019  Last Fill Quantity: 30 tablet,  # refills: 0   Last office visit: 5/17/2019 with prescribing provider:  Latasha   Future Office Visit:     30 tablet 0     Sig: TAKE 1 TABLET BY MOUTH DAILY       Beta-Blockers Protocol Passed - 7/6/2019  3:26 PM        Passed - Blood pressure under 140/90 in " "past 12 months     BP Readings from Last 3 Encounters:   05/17/19 136/76   04/02/19 136/82   09/05/18 134/88                 Passed - Patient is age 6 or older        Passed - Recent (12 mo) or future (30 days) visit within the authorizing provider's specialty     Patient had office visit in the last 12 months or has a visit in the next 30 days with authorizing provider or within the authorizing provider's specialty.  See \"Patient Info\" tab in inbasket, or \"Choose Columns\" in Meds & Orders section of the refill encounter.              Passed - Medication is active on med list          "

## 2019-07-08 RX ORDER — LOSARTAN POTASSIUM AND HYDROCHLOROTHIAZIDE 25; 100 MG/1; MG/1
1 TABLET ORAL DAILY
Refills: 0 | OUTPATIENT
Start: 2019-07-08

## 2019-07-08 RX ORDER — METOPROLOL SUCCINATE 25 MG/1
TABLET, EXTENDED RELEASE ORAL
Refills: 0 | OUTPATIENT
Start: 2019-07-08

## 2019-08-07 DIAGNOSIS — I10 ESSENTIAL HYPERTENSION WITH GOAL BLOOD PRESSURE LESS THAN 140/90: ICD-10-CM

## 2019-08-07 RX ORDER — LOSARTAN POTASSIUM AND HYDROCHLOROTHIAZIDE 25; 100 MG/1; MG/1
1 TABLET ORAL DAILY
Qty: 30 TABLET | Refills: 0 | Status: SHIPPED | OUTPATIENT
Start: 2019-08-07 | End: 2019-08-22

## 2019-08-07 RX ORDER — METOPROLOL SUCCINATE 25 MG/1
TABLET, EXTENDED RELEASE ORAL
Qty: 30 TABLET | Refills: 0 | Status: SHIPPED | OUTPATIENT
Start: 2019-08-07 | End: 2019-08-22

## 2019-08-07 NOTE — TELEPHONE ENCOUNTER
Last visit for bp 6/6/18.  Overdue for labs.  No upcoming appt.  One month sent previously but did not advise patient of need of visit.  Refill sent.  Please call and schedule visit.  Claudette Peres RN

## 2019-08-07 NOTE — TELEPHONE ENCOUNTER
"Requested Prescriptions   Pending Prescriptions Disp Refills     losartan-hydrochlorothiazide (HYZAAR) 100-25 MG tablet [Pharmacy Med Name: LOSARTAN/HCTZ 100/25MG TABLETS] 30 tablet 0     Sig: TAKE 1 TABLET BY MOUTH DAILY       Last Written Prescription Date: 7/5/19   Last Fill Quantity: 30 tablets,  # refills: 0   Last office visit: 5/17/2019 with prescribing provider:  Latasha   Future Office Visit:        Angiotensin-II Receptors Failed - 8/7/2019  8:30 AM        Failed - Normal serum creatinine on file in past 12 months     Recent Labs   Lab Test 06/06/18  1139   CR 0.62             Failed - Normal serum potassium on file in past 12 months     Recent Labs   Lab Test 06/06/18  1139   POTASSIUM 3.4                    Passed - Last blood pressure under 140/90 in past 12 months     BP Readings from Last 3 Encounters:   05/17/19 136/76   04/02/19 136/82   09/05/18 134/88                 Passed - Recent (12 mo) or future (30 days) visit within the authorizing provider's specialty     Patient had office visit in the last 12 months or has a visit in the next 30 days with authorizing provider or within the authorizing provider's specialty.  See \"Patient Info\" tab in inbasket, or \"Choose Columns\" in Meds & Orders section of the refill encounter.              Passed - Medication is active on med list        Passed - Patient is age 18 or older        Passed - No active pregnancy on record        Passed - No positive pregnancy test in past 12 months        metoprolol succinate ER (TOPROL-XL) 25 MG 24 hr tablet [Pharmacy Med Name: METOPROLOL ER SUCCINATE 25MG TABS] 30 tablet 0     Sig: TAKE 1 TABLET BY MOUTH DAILY       Last Written Prescription Date: 7/5/19   Last Fill Quantity: 30 tablets,  # refills: 0   Last office visit: 5/17/2019 with prescribing provider:   Latasha   Future Office Visit:        Beta-Blockers Protocol Passed - 8/7/2019  8:30 AM        Passed - Blood pressure under 140/90 in past 12 months     BP Readings " "from Last 3 Encounters:   05/17/19 136/76   04/02/19 136/82   09/05/18 134/88                 Passed - Patient is age 6 or older        Passed - Recent (12 mo) or future (30 days) visit within the authorizing provider's specialty     Patient had office visit in the last 12 months or has a visit in the next 30 days with authorizing provider or within the authorizing provider's specialty.  See \"Patient Info\" tab in inbasket, or \"Choose Columns\" in Meds & Orders section of the refill encounter.              Passed - Medication is active on med list          "

## 2019-08-22 ENCOUNTER — OFFICE VISIT (OUTPATIENT)
Dept: FAMILY MEDICINE | Facility: CLINIC | Age: 54
End: 2019-08-22
Payer: COMMERCIAL

## 2019-08-22 VITALS
BODY MASS INDEX: 34.82 KG/M2 | HEIGHT: 65 IN | WEIGHT: 209 LBS | SYSTOLIC BLOOD PRESSURE: 123 MMHG | HEART RATE: 76 BPM | DIASTOLIC BLOOD PRESSURE: 79 MMHG | OXYGEN SATURATION: 98 % | TEMPERATURE: 98.3 F

## 2019-08-22 DIAGNOSIS — R76.8 ELEVATED RHEUMATOID FACTOR: ICD-10-CM

## 2019-08-22 DIAGNOSIS — J20.9 ACUTE BRONCHITIS, UNSPECIFIED ORGANISM: ICD-10-CM

## 2019-08-22 DIAGNOSIS — F32.0 MILD MAJOR DEPRESSION (H): ICD-10-CM

## 2019-08-22 DIAGNOSIS — R91.8 PULMONARY NODULES: ICD-10-CM

## 2019-08-22 DIAGNOSIS — G43.009 MIGRAINE WITHOUT AURA AND WITHOUT STATUS MIGRAINOSUS, NOT INTRACTABLE: ICD-10-CM

## 2019-08-22 DIAGNOSIS — I10 ESSENTIAL HYPERTENSION WITH GOAL BLOOD PRESSURE LESS THAN 140/90: Primary | ICD-10-CM

## 2019-08-22 LAB — ERYTHROCYTE [SEDIMENTATION RATE] IN BLOOD BY WESTERGREN METHOD: 19 MM/H (ref 0–30)

## 2019-08-22 PROCEDURE — 86140 C-REACTIVE PROTEIN: CPT | Performed by: NURSE PRACTITIONER

## 2019-08-22 PROCEDURE — 80053 COMPREHEN METABOLIC PANEL: CPT | Performed by: NURSE PRACTITIONER

## 2019-08-22 PROCEDURE — 99214 OFFICE O/P EST MOD 30 MIN: CPT | Performed by: NURSE PRACTITIONER

## 2019-08-22 PROCEDURE — 85652 RBC SED RATE AUTOMATED: CPT | Performed by: NURSE PRACTITIONER

## 2019-08-22 PROCEDURE — 36415 COLL VENOUS BLD VENIPUNCTURE: CPT | Performed by: NURSE PRACTITIONER

## 2019-08-22 PROCEDURE — 84443 ASSAY THYROID STIM HORMONE: CPT | Performed by: NURSE PRACTITIONER

## 2019-08-22 PROCEDURE — 83721 ASSAY OF BLOOD LIPOPROTEIN: CPT | Performed by: NURSE PRACTITIONER

## 2019-08-22 RX ORDER — LOSARTAN POTASSIUM AND HYDROCHLOROTHIAZIDE 25; 100 MG/1; MG/1
1 TABLET ORAL DAILY
Qty: 90 TABLET | Refills: 3 | Status: SHIPPED | OUTPATIENT
Start: 2019-08-22 | End: 2020-05-12

## 2019-08-22 RX ORDER — ALBUTEROL SULFATE 90 UG/1
2 AEROSOL, METERED RESPIRATORY (INHALATION) EVERY 4 HOURS PRN
Qty: 1 INHALER | Refills: 11 | Status: SHIPPED | OUTPATIENT
Start: 2019-08-22 | End: 2021-10-27

## 2019-08-22 RX ORDER — METOPROLOL SUCCINATE 25 MG/1
25 TABLET, EXTENDED RELEASE ORAL DAILY
Qty: 90 TABLET | Refills: 3 | Status: SHIPPED | OUTPATIENT
Start: 2019-08-22 | End: 2020-09-24

## 2019-08-22 RX ORDER — SUMATRIPTAN 100 MG/1
TABLET, FILM COATED ORAL
Qty: 6 TABLET | Refills: 1 | Status: SHIPPED | OUTPATIENT
Start: 2019-08-22 | End: 2021-11-02

## 2019-08-22 ASSESSMENT — ANXIETY QUESTIONNAIRES
6. BECOMING EASILY ANNOYED OR IRRITABLE: NOT AT ALL
4. TROUBLE RELAXING: NOT AT ALL
5. BEING SO RESTLESS THAT IT IS HARD TO SIT STILL: NOT AT ALL
GAD7 TOTAL SCORE: 0
1. FEELING NERVOUS, ANXIOUS, OR ON EDGE: NOT AT ALL
2. NOT BEING ABLE TO STOP OR CONTROL WORRYING: NOT AT ALL
3. WORRYING TOO MUCH ABOUT DIFFERENT THINGS: NOT AT ALL
7. FEELING AFRAID AS IF SOMETHING AWFUL MIGHT HAPPEN: NOT AT ALL
GAD7 TOTAL SCORE: 0
GAD7 TOTAL SCORE: 0
7. FEELING AFRAID AS IF SOMETHING AWFUL MIGHT HAPPEN: NOT AT ALL

## 2019-08-22 ASSESSMENT — PATIENT HEALTH QUESTIONNAIRE - PHQ9
10. IF YOU CHECKED OFF ANY PROBLEMS, HOW DIFFICULT HAVE THESE PROBLEMS MADE IT FOR YOU TO DO YOUR WORK, TAKE CARE OF THINGS AT HOME, OR GET ALONG WITH OTHER PEOPLE: NOT DIFFICULT AT ALL
SUM OF ALL RESPONSES TO PHQ QUESTIONS 1-9: 0
SUM OF ALL RESPONSES TO PHQ QUESTIONS 1-9: 0

## 2019-08-22 ASSESSMENT — MIFFLIN-ST. JEOR: SCORE: 1553.9

## 2019-08-22 NOTE — PROGRESS NOTES
Subjective     Alison Barfield is a 53 year old female who presents to clinic today for the following health issues:    HPI   Medication Followup     Taking Medication as prescribed: yes    Side Effects:  None    Medication Helping Symptoms:  yes   Hypertension:  BP well controlled on losartan, hydrochlorothiazide, metoprolol.      Migraine:  Last migraine at least 6 months ago, much better when follows improved diet.     Depression:  PhQ 9 score of 0, EDGAR 7 score of 0.    Patient Active Problem List   Diagnosis     Malaise and fatigue     Mild major depression (H)     Breast cyst     High triglycerides     Ductal carcinoma in situ of breast     CARDIOVASCULAR SCREENING; LDL GOAL LESS THAN 130     Breast cancer (H)     Health Care Home     Elevated rheumatoid factor     Migraine     S/P abdominal supracervical subtotal hysterectomy     Pain of left thumb     Pulmonary nodules     Essential hypertension with goal blood pressure less than 140/90     Past Surgical History:   Procedure Laterality Date     BREAST SURGERY      masectomy-double     C NONSPECIFIC PROCEDURE      Child birth x 3     EXCISE MASS NECK  2012    Procedure: EXCISE MASS NECK;  Excision of Left Deep Neck Cyst;  Surgeon: Elmer Hernandez MD;  Location: [facility] OR     HYSTERECTOMY, PAP NO LONGER INDICATED       kidney stone extraction       and      LAPAROSCOPIC ASSISTED HYSTERECTOMY VAGINAL, BILATERAL SALPINGO-OOPHORECTOMY, COMBINED      hysterectomy - 2010 - [facility]     MASTECTOMY, BILATERAL         Social History     Tobacco Use     Smoking status: Former Smoker     Types: Cigarettes     Smokeless tobacco: Never Used     Tobacco comment: Quit in    Substance Use Topics     Alcohol use: No     Comment: none     Family History   Problem Relation Age of Onset     Cancer Father         lung CA     Other - See Comments Father          from blood clot     Cancer Brother         squamous cell of the jaw     Hypertension  "Maternal Grandmother      Cerebrovascular Disease Maternal Grandmother          Current Outpatient Medications   Medication Sig Dispense Refill     albuterol (PROAIR HFA/PROVENTIL HFA/VENTOLIN HFA) 108 (90 Base) MCG/ACT inhaler Inhale 2 puffs into the lungs every 4 hours as needed for shortness of breath / dyspnea or wheezing 1 Inhaler 11     losartan-hydrochlorothiazide (HYZAAR) 100-25 MG tablet Take 1 tablet by mouth daily 90 tablet 3     metoprolol succinate ER (TOPROL-XL) 25 MG 24 hr tablet Take 1 tablet (25 mg) by mouth daily 90 tablet 3     SUMAtriptan (IMITREX) 100 MG tablet Take 1 tablet by mouth. May repeat in 2 hours if needed: max 2/day; average number of headaches monthly 1 6 tablet 1     Acetaminophen (TYLENOL PO) Take 1,000 mg by mouth as needed.       BP Readings from Last 3 Encounters:   08/22/19 123/79   05/17/19 136/76   04/02/19 136/82    Wt Readings from Last 3 Encounters:   08/22/19 94.8 kg (209 lb)   05/17/19 93.4 kg (206 lb)   04/02/19 97.5 kg (215 lb)      Reviewed and updated as needed this visit by Provider         Review of Systems   ROS COMP: CONSTITUTIONAL: NEGATIVE for fever, chills, change in weight  ENT/MOUTH: NEGATIVE for ear, mouth and throat problems  RESP: NEGATIVE for significant cough or SOB  CV: NEGATIVE for chest pain, palpitations or peripheral edema  PSYCHIATRIC: NEGATIVE for changes in mood or affect      Objective    /79 (BP Location: Right arm, Patient Position: Chair, Cuff Size: Adult Large)   Pulse 76   Temp 98.3  F (36.8  C) (Oral)   Ht 1.651 m (5' 5\")   Wt 94.8 kg (209 lb)   LMP 09/09/2010   SpO2 98%   BMI 34.78 kg/m    Body mass index is 34.78 kg/m .  Physical Exam   GENERAL: healthy, alert and no distress  NECK: no adenopathy, no asymmetry, masses, or scars and thyroid normal to palpation  RESP: lungs clear to auscultation - no rales, rhonchi or wheezes  CV: regular rate and rhythm, normal S1 S2, no S3 or S4, no murmur, click or rub, no peripheral edema " "  PSYCH: mentation appears normal, affect normal/bright            Assessment & Plan   Assessment      Plan  1. Essential hypertension with goal blood pressure less than 140/90: /79, well controlled.    - losartan-hydrochlorothiazide (HYZAAR) 100-25 MG tablet; Take 1 tablet by mouth daily  Dispense: 90 tablet; Refill: 3  - metoprolol succinate ER (TOPROL-XL) 25 MG 24 hr tablet; Take 1 tablet (25 mg) by mouth daily  Dispense: 90 tablet; Refill: 3  - Comprehensive metabolic panel  - LDL cholesterol direct    2. Migraine without aura and without status migrainosus, not intractable: well controlled.  - SUMAtriptan (IMITREX) 100 MG tablet; Take 1 tablet by mouth. May repeat in 2 hours if needed: max 2/day; average number of headaches monthly 1  Dispense: 6 tablet; Refill: 1    3. Mild major depression (H):  PHQ 9 of 0    4. Acute bronchitis, unspecified organism: refill.  - albuterol (PROAIR HFA/PROVENTIL HFA/VENTOLIN HFA) 108 (90 Base) MCG/ACT inhaler; Inhale 2 puffs into the lungs every 4 hours as needed for shortness of breath / dyspnea or wheezing  Dispense: 1 Inhaler; Refill: 11    5. Elevated rheumatoid factor  - Erythrocyte sedimentation rate auto  - CRP inflammation  - TSH with free T4 reflex    BMI:   Estimated body mass index is 34.78 kg/m  as calculated from the following:    Height as of this encounter: 1.651 m (5' 5\").    Weight as of this encounter: 94.8 kg (209 lb).   Weight management plan: Discussed healthy diet and exercise guidelines            Return in about 1 year (around 8/22/2020) for Physical Exam, Lab Work.    Susan Haase, APRN [facility]        Answers for HPI/ROS submitted by the patient on 8/22/2019   If you checked off any problems, how difficult have these problems made it for you to do your work, take care of things at home, or get along with other people?: Not difficult at all  PHQ9 TOTAL SCORE: 0  EDGAR 7 TOTAL SCORE: 0    "

## 2019-08-23 LAB
ALBUMIN SERPL-MCNC: 4.3 G/DL (ref 3.4–5)
ALP SERPL-CCNC: 83 U/L (ref 40–150)
ALT SERPL W P-5'-P-CCNC: 29 U/L (ref 0–50)
ANION GAP SERPL CALCULATED.3IONS-SCNC: 6 MMOL/L (ref 3–14)
AST SERPL W P-5'-P-CCNC: 18 U/L (ref 0–45)
BILIRUB SERPL-MCNC: 0.3 MG/DL (ref 0.2–1.3)
BUN SERPL-MCNC: 20 MG/DL (ref 7–30)
CALCIUM SERPL-MCNC: 9.2 MG/DL (ref 8.5–10.1)
CHLORIDE SERPL-SCNC: 103 MMOL/L (ref 94–109)
CO2 SERPL-SCNC: 29 MMOL/L (ref 20–32)
CREAT SERPL-MCNC: 0.68 MG/DL (ref 0.52–1.04)
CRP SERPL-MCNC: 5.1 MG/L (ref 0–8)
GFR SERPL CREATININE-BSD FRML MDRD: >90 ML/MIN/{1.73_M2}
GLUCOSE SERPL-MCNC: 105 MG/DL (ref 70–99)
LDLC SERPL DIRECT ASSAY-MCNC: 155 MG/DL
POTASSIUM SERPL-SCNC: 3.6 MMOL/L (ref 3.4–5.3)
PROT SERPL-MCNC: 8.2 G/DL (ref 6.8–8.8)
SODIUM SERPL-SCNC: 138 MMOL/L (ref 133–144)
TSH SERPL DL<=0.005 MIU/L-ACNC: 1.59 MU/L (ref 0.4–4)

## 2019-08-23 ASSESSMENT — PATIENT HEALTH QUESTIONNAIRE - PHQ9: SUM OF ALL RESPONSES TO PHQ QUESTIONS 1-9: 0

## 2019-08-23 ASSESSMENT — ANXIETY QUESTIONNAIRES: GAD7 TOTAL SCORE: 0

## 2019-08-23 NOTE — RESULT ENCOUNTER NOTE
Candelario Rosas,  Your ESR (checks for inflammation) is normal at 19.  Sincerely,     Susan Haase, CNP

## 2019-08-24 NOTE — RESULT ENCOUNTER NOTE
Candelario Rosas,  Your lab results are as below:  1)  TSH (thyroid level) 1.59 which is normal (range 0.4-4)  2)  Your LDL (bad cholesterol) is slightly elevated at 155. Continue to follow a low cholesterol diet and we will recheck this in 1 year.  3)  Glucose is 105 which is normal for not fasting.   4)  CRP (test for inflammation) is normal at 5.1.    If you have any questions do not hesitate to call the clinic to discuss the results with me further.     Sincerely,    Susan Haase, CNP

## 2019-10-01 ENCOUNTER — HEALTH MAINTENANCE LETTER (OUTPATIENT)
Age: 54
End: 2019-10-01

## 2020-02-21 ENCOUNTER — TELEPHONE (OUTPATIENT)
Dept: FAMILY MEDICINE | Facility: CLINIC | Age: 55
End: 2020-02-21

## 2020-02-21 ENCOUNTER — VIRTUAL VISIT (OUTPATIENT)
Dept: FAMILY MEDICINE | Facility: OTHER | Age: 55
End: 2020-02-21

## 2020-02-21 NOTE — PROGRESS NOTES
"Date: 2020 10:07:06  Clinician: Igor Manley  Clinician NPI: [id number]  Patient: Alison Barfield  Patient : 1965  Patient Address: [address] [place] [address], [place] [address]  Patient Phone: [phone]  Visit Protocol: URI  Patient Summary:  Alison is a 54 year old ( : 1965 ) female who initiated a Visit for cold, sinus infection, or influenza. When asked the question \"Please sign me up to receive news, health information and promotions. \", Alison responded \"No\".    Alison states her symptoms started today.   Her symptoms consist of myalgia, malaise, and nasal congestion. She is experiencing difficulty breathing due to nasal congestion but she is not short of breath.   Symptom details   Nasal secretions: The color of her mucus is yellow.   Alison denies having cough, facial pain or pressure, headache, sore throat, ear pain, chills, fever, rhinitis, wheezing, and teeth pain. She also denies having recent facial or sinus surgery in the past 60 days and taking antibiotic medication for the symptoms.   Precipitating events  She has recently been exposed to someone with influenza. Alison has not been in close contact with any high risk individuals.   Alison has not traveled internationally in the last 14 days before the start of her symptoms.   Pertinent medical history  Alison typically gets a yeast infection when she takes antibiotics. She has used fluconazole (Diflucan) to treat previous yeast infections. 2 doses of fluconazole (Diflucan) has typically been needed for symptoms to resolve in the past.  Weight: 195 lbs   Alison does not smoke or use smokeless tobacco.   Weight: 195 lbs    MEDICATIONS: No current medications, ALLERGIES: NKDA  Clinician Response:  Dear Alison,  Based on the information provided, you have a viral upper respiratory infection, otherwise known as a cold. Symptoms vary from person to person, but can include sneezing, coughing, a runny nose, sore throat, and headache and range " from mild to severe.  Unfortunately, there are no medications that can cure a cold, so treatment is focused on controlling symptoms as much as possible. Most people gradually feel better until symptoms are gone in 1-2 weeks.  Medication information  Because you have a viral infection, antibiotics will not help you get better. Treating a viral infection with antibiotics could actually make you feel worse.  For more information on why I am not prescribing antibiotics, please watch this video: Antibiotics Aren't Always the Answer.  I am prescribing:     Fluticasone 50 mcg/actuation nasal spray. Inhale 2 sprays in each nostril 1 time per day; after 1 week, may adjust to 1 - 2 sprays in each nostril 1 time per day. This medication takes several days to start working, so keep taking it even if it doesn't help right away. There are no refills with this prescription.   Unless you are allergic to the over-the-counter medication(s) below, I recommend using:       Ibuprofen (Advil or store brand) 200 mg oral tablet. Take 1-3 tablets (200-600 mg) by mouth every 8 hours to help with the discomfort. Make sure to take the ibuprofen with food. Do not exceed 2400 mg in 24 hours.      Dextromethorphan (Robitussin DM or store brand). This medication is an expectorant that works by thinning the mucus in your air passages to make it easier to cough up the mucus and clear your airways. Please follow the instructions on the package.     Over-the-counter medications do not require a prescription. Ask the pharmacist if you have any questions.  Self care  The following tips will keep you as comfortable as possible while you recover:     Rest    Drink plenty of water and other liquids    Take a hot shower to loosen congestion     When to seek care  Please be seen in a clinic or urgent care if new symptoms develop, or symptoms become worse.   Diagnosis: Viral URI  Diagnosis ICD: J06.9  Prescription: fluticasone 50 mcg/actuation nasal  spray,suspension 1 120 spray aerosol with adapter (grams), 30 days supply. Inhale 2 sprays in each nostril 1 time per day; after 1 week, may adjust to 1 - 2 sprays in each nostril 1 time per day.. Refills: 0, Refill as needed: no, Allow substitutions: yes

## 2020-02-21 NOTE — TELEPHONE ENCOUNTER
Patient calling and states future daughter-in-law diagnosed with influenza and son also on Tamiflu.  States going out of state for their wedding this weekend.  States she now is having symptoms.  Fever, fatigue and aches since yesterday.  Today feels like getting a cold.  Wondering what to do.  Advised can send E-Visit.  Claudette Peres RN

## 2020-04-08 ENCOUNTER — E-VISIT (OUTPATIENT)
Dept: FAMILY MEDICINE | Facility: CLINIC | Age: 55
End: 2020-04-08
Payer: COMMERCIAL

## 2020-04-08 DIAGNOSIS — J01.00 ACUTE NON-RECURRENT MAXILLARY SINUSITIS: Primary | ICD-10-CM

## 2020-04-08 PROCEDURE — 99421 OL DIG E/M SVC 5-10 MIN: CPT | Performed by: NURSE PRACTITIONER

## 2020-09-24 DIAGNOSIS — I10 ESSENTIAL HYPERTENSION WITH GOAL BLOOD PRESSURE LESS THAN 140/90: ICD-10-CM

## 2020-09-24 RX ORDER — LOSARTAN POTASSIUM AND HYDROCHLOROTHIAZIDE 25; 100 MG/1; MG/1
1 TABLET ORAL DAILY
Qty: 90 TABLET | Refills: 0 | Status: SHIPPED | OUTPATIENT
Start: 2020-09-24 | End: 2020-11-19

## 2020-09-24 RX ORDER — METOPROLOL SUCCINATE 25 MG/1
TABLET, EXTENDED RELEASE ORAL
Qty: 90 TABLET | Refills: 0 | Status: SHIPPED | OUTPATIENT
Start: 2020-09-24 | End: 2020-11-19

## 2020-09-24 NOTE — TELEPHONE ENCOUNTER
Please have Alison schedule a clinic visit with me (she is due for a physical), last seen 8/22/2019. Arrive fasting for labs.   Thanks,  Susan Haase, CNP

## 2020-09-24 NOTE — TELEPHONE ENCOUNTER
Called pt-lm to call clinic, physical appointment needed with Susan Haase.    Maria Luz Harris/ALTHEA

## 2020-09-24 NOTE — TELEPHONE ENCOUNTER
Medication is being filled for 1 time refill only due to:  Patient needs labs Metabolic Panel . BP check     RN will issue samantha refill.     Visit is up to date. Labs and BP check not current. Do you want and office visit with BP check, or just BP check.     Kelsea Grubbs, RN   [facility] -- Triage Nurse

## 2020-11-18 ENCOUNTER — TELEPHONE (OUTPATIENT)
Dept: FAMILY MEDICINE | Facility: CLINIC | Age: 55
End: 2020-11-18

## 2020-11-18 NOTE — TELEPHONE ENCOUNTER
S-(situation): patient calling to discuss blood pressure     B-(background): hypertension - on two medications currently (has not been seen in clinic by pcp in over a year 8/22/2019)     A-(assessment): patient states that she has been taking her blood pressure throughout the day on her FIT BIT   Today has noted higher readings, 168/109, 142/95, 153/98, last reading checked was 138/92   Patient states she has been more stressed due to time line of getting things done, does not mention what this is related to home or work   Patient is DENIES: headaches, chest pain/discomfort, edema in lower legs   Patient has had a few blood nose episodes but believes this could be from the dry air as she has had these in the past during this time of year   Patient states compliance with losartan-hydrochlorothiazide and metoprolol  (last refills given 9/24/2020 as samantha)     R-(recommendations): RN scheduled appointment with Laurie PHELAN for tomorrow 11/19/2020    Patient is advised if she starts to have increase in BP again with symptoms to go to urgent care for evaluation. RN advised that it is uncertain how accurate a FIT BIT is as far as BP readings     Erinn Maki, Registered Nurse, PAL (Patient Advocate Liason)   [facility]   [phone]

## 2020-11-18 NOTE — PROGRESS NOTES
Pre-Visit Planning   Next 5 appointments (look out 90 days)    Nov 19, 2020  1:40 PM  (Arrive by 1:20 PM)  Office Visit with Laurie Madrid PA-C  [facility] ([facility] [address] [place] [id number] [phone]        Appointment Notes for this encounter:   LG- reviewed- blood pressure     Questionnaires Reviewed/Assigned  Additional questionnaires assigned      Patient preferred phone number: [phone]    Unable to reach. Left voicemail. Advised patient to call clinic back at [phone].

## 2020-11-19 ENCOUNTER — OFFICE VISIT (OUTPATIENT)
Dept: FAMILY MEDICINE | Facility: CLINIC | Age: 55
End: 2020-11-19
Payer: COMMERCIAL

## 2020-11-19 VITALS
HEART RATE: 97 BPM | DIASTOLIC BLOOD PRESSURE: 79 MMHG | HEIGHT: 66 IN | RESPIRATION RATE: 18 BRPM | TEMPERATURE: 98.3 F | BODY MASS INDEX: 34.97 KG/M2 | OXYGEN SATURATION: 97 % | SYSTOLIC BLOOD PRESSURE: 117 MMHG | WEIGHT: 217.6 LBS

## 2020-11-19 DIAGNOSIS — Z11.59 NEED FOR HEPATITIS C SCREENING TEST: ICD-10-CM

## 2020-11-19 DIAGNOSIS — Z13.220 SCREENING FOR HYPERLIPIDEMIA: ICD-10-CM

## 2020-11-19 DIAGNOSIS — Z13.29 SCREENING FOR THYROID DISORDER: ICD-10-CM

## 2020-11-19 DIAGNOSIS — Z11.4 SCREENING FOR HIV (HUMAN IMMUNODEFICIENCY VIRUS): ICD-10-CM

## 2020-11-19 DIAGNOSIS — I10 ESSENTIAL HYPERTENSION WITH GOAL BLOOD PRESSURE LESS THAN 140/90: Primary | ICD-10-CM

## 2020-11-19 DIAGNOSIS — Z23 NEED FOR PROPHYLACTIC VACCINATION AND INOCULATION AGAINST INFLUENZA: ICD-10-CM

## 2020-11-19 DIAGNOSIS — Z12.11 SCREEN FOR COLON CANCER: ICD-10-CM

## 2020-11-19 PROCEDURE — 84443 ASSAY THYROID STIM HORMONE: CPT | Performed by: PHYSICIAN ASSISTANT

## 2020-11-19 PROCEDURE — 87389 HIV-1 AG W/HIV-1&-2 AB AG IA: CPT | Performed by: PHYSICIAN ASSISTANT

## 2020-11-19 PROCEDURE — 80048 BASIC METABOLIC PNL TOTAL CA: CPT | Performed by: PHYSICIAN ASSISTANT

## 2020-11-19 PROCEDURE — 90682 RIV4 VACC RECOMBINANT DNA IM: CPT | Performed by: PHYSICIAN ASSISTANT

## 2020-11-19 PROCEDURE — 80061 LIPID PANEL: CPT | Performed by: PHYSICIAN ASSISTANT

## 2020-11-19 PROCEDURE — 36415 COLL VENOUS BLD VENIPUNCTURE: CPT | Performed by: PHYSICIAN ASSISTANT

## 2020-11-19 PROCEDURE — 99214 OFFICE O/P EST MOD 30 MIN: CPT | Mod: 25 | Performed by: PHYSICIAN ASSISTANT

## 2020-11-19 PROCEDURE — 86803 HEPATITIS C AB TEST: CPT | Performed by: PHYSICIAN ASSISTANT

## 2020-11-19 PROCEDURE — 90471 IMMUNIZATION ADMIN: CPT | Performed by: PHYSICIAN ASSISTANT

## 2020-11-19 RX ORDER — METOPROLOL SUCCINATE 25 MG/1
25 TABLET, EXTENDED RELEASE ORAL DAILY
Qty: 90 TABLET | Refills: 1 | Status: SHIPPED | OUTPATIENT
Start: 2020-11-19 | End: 2021-01-04

## 2020-11-19 RX ORDER — LOSARTAN POTASSIUM AND HYDROCHLOROTHIAZIDE 25; 100 MG/1; MG/1
1 TABLET ORAL DAILY
Qty: 90 TABLET | Refills: 1 | Status: SHIPPED | OUTPATIENT
Start: 2020-11-19 | End: 2021-07-07

## 2020-11-19 ASSESSMENT — ANXIETY QUESTIONNAIRES
6. BECOMING EASILY ANNOYED OR IRRITABLE: SEVERAL DAYS
2. NOT BEING ABLE TO STOP OR CONTROL WORRYING: SEVERAL DAYS
GAD7 TOTAL SCORE: 4
7. FEELING AFRAID AS IF SOMETHING AWFUL MIGHT HAPPEN: NOT AT ALL
1. FEELING NERVOUS, ANXIOUS, OR ON EDGE: SEVERAL DAYS
GAD7 TOTAL SCORE: 4
GAD7 TOTAL SCORE: 4
7. FEELING AFRAID AS IF SOMETHING AWFUL MIGHT HAPPEN: NOT AT ALL
3. WORRYING TOO MUCH ABOUT DIFFERENT THINGS: SEVERAL DAYS
5. BEING SO RESTLESS THAT IT IS HARD TO SIT STILL: NOT AT ALL
4. TROUBLE RELAXING: NOT AT ALL

## 2020-11-19 ASSESSMENT — PATIENT HEALTH QUESTIONNAIRE - PHQ9
10. IF YOU CHECKED OFF ANY PROBLEMS, HOW DIFFICULT HAVE THESE PROBLEMS MADE IT FOR YOU TO DO YOUR WORK, TAKE CARE OF THINGS AT HOME, OR GET ALONG WITH OTHER PEOPLE: NOT DIFFICULT AT ALL
SUM OF ALL RESPONSES TO PHQ QUESTIONS 1-9: 3
SUM OF ALL RESPONSES TO PHQ QUESTIONS 1-9: 3

## 2020-11-19 ASSESSMENT — MIFFLIN-ST. JEOR: SCORE: 1590.84

## 2020-11-19 NOTE — PROGRESS NOTES
"Subjective   No future appointments.  Appointment Notes for this encounter:   LG- reviewed- blood pressure     Health Maintenance Due   Topic Date Due     ANNUAL REVIEW OF HM ORDERS  1965     ADVANCE CARE PLANNING  1965     COLORECTAL CANCER SCREENING  09/30/1975     HIV SCREENING  09/30/1980     HEPATITIS C SCREENING  09/30/1983     ZOSTER IMMUNIZATION (1 of 2) 09/30/2015     PREVENTIVE CARE VISIT  04/03/2016     LIPID  08/05/2017     BMP  02/22/2020     PHQ-9  02/22/2020     INFLUENZA VACCINE (1) 09/01/2020     Health Maintenance addressed:  Colon Cancer Screen/FIT, Immunizations and PHQ9    Colonoscopy Pt agrees to FIT test - please pend FIT order remind pt to  at lab    MyChart Status:  Active and Using  Alison WHITFIELD Carolyne is a 55 year old female who presents to clinic today for the following health issues:    History of Present Illness       Hypertension: She presents for follow up of hypertension.  She does check blood pressure  regularly outside of the clinic. Outside blood pressures have been over 140/90. She follows a low salt diet.       Patient is taking her medications as prescribed. She is using her watch to check her blood pressure and it has been high.    Review of Systems   GENERAL:  No fevers  RESP:  No shortness of breath  CARDIAC: No chest pain  NEURO:  No headaches        Objective    /79   Pulse 97   Temp 98.3  F (36.8  C) (Oral)   Resp 18   Ht 1.664 m (5' 5.5\")   Wt 98.7 kg (217 lb 9.6 oz)   LMP 09/09/2010   SpO2 97%   BMI 35.66 kg/m    Body mass index is 35.66 kg/m .  Physical Exam   GENERAL: No acute distress  HEENT: Normocephalic, PERRL  CARDIAC: Regular rate and rhythm. No murmurs.  PULMONARY: Lungs are clear to auscultation bilaterally. No wheezes, rhonchi or crackles.  NEURO: Alert and non-focal          Assessment & Plan     Essential hypertension with goal blood pressure less than 140/90  Due for labs. Obtained today.   Blood pressure under good control. " Refills provided.  Follow up 6 months  - BASIC METABOLIC PANEL  - losartan-hydrochlorothiazide (HYZAAR) 100-25 MG tablet; Take 1 tablet by mouth daily  - metoprolol succinate ER (TOPROL-XL) 25 MG 24 hr tablet; Take 1 tablet (25 mg) by mouth daily    Screen for colon cancer  FIT testing ordered.  - Fecal colorectal cancer screen FIT - Future (S+30); Future    Screening for HIV (human immunodeficiency virus)  HIV testing ordered  - HIV Antigen Antibody Combo    Need for hepatitis C screening test  Hepatitis C testing ordered.  - Hepatitis C Screen Reflex to HCV RNA Quant and Genotype    Screening for hyperlipidemia  Due for lipid panel. Ordered.  - Lipid panel reflex to direct LDL Non-fasting    Screening for thyroid disorder  TSH ordered.   - TSH with free T4 reflex    Need for prophylactic vaccination and inoculation against influenza  Influenza vaccine given.  - INFLUENZA QUAD, RECOMBINANT, P-FREE (RIV4) (FLUBLOCK) [47733]       Return in about 6 months (around 5/19/2021) for Follow up blood pressure, In Person, With PCP.    Laurie Madrid PA-C  [facility]    Answers for HPI/ROS submitted by the patient on 11/19/2020   Chronic problems general questions HPI Form  If you checked off any problems, how difficult have these problems made it for you to do your work, take care of things at home, or get along with other people?: Not difficult at all  PHQ9 TOTAL SCORE: 3  EDGAR 7 TOTAL SCORE: 4

## 2020-11-20 LAB
ANION GAP SERPL CALCULATED.3IONS-SCNC: 7 MMOL/L (ref 3–14)
BUN SERPL-MCNC: 23 MG/DL (ref 7–30)
CALCIUM SERPL-MCNC: 9.8 MG/DL (ref 8.5–10.1)
CHLORIDE SERPL-SCNC: 99 MMOL/L (ref 94–109)
CHOLEST SERPL-MCNC: 244 MG/DL
CO2 SERPL-SCNC: 29 MMOL/L (ref 20–32)
CREAT SERPL-MCNC: 0.67 MG/DL (ref 0.52–1.04)
GFR SERPL CREATININE-BSD FRML MDRD: >90 ML/MIN/{1.73_M2}
GLUCOSE SERPL-MCNC: 133 MG/DL (ref 70–99)
HCV AB SERPL QL IA: NONREACTIVE
HDLC SERPL-MCNC: 40 MG/DL
HIV 1+2 AB+HIV1 P24 AG SERPL QL IA: NONREACTIVE
LDLC SERPL CALC-MCNC: 153 MG/DL
NONHDLC SERPL-MCNC: 204 MG/DL
POTASSIUM SERPL-SCNC: 3.3 MMOL/L (ref 3.4–5.3)
SODIUM SERPL-SCNC: 135 MMOL/L (ref 133–144)
TRIGL SERPL-MCNC: 257 MG/DL
TSH SERPL DL<=0.005 MIU/L-ACNC: 1.29 MU/L (ref 0.4–4)

## 2020-11-20 ASSESSMENT — PATIENT HEALTH QUESTIONNAIRE - PHQ9: SUM OF ALL RESPONSES TO PHQ QUESTIONS 1-9: 3

## 2020-11-20 ASSESSMENT — ANXIETY QUESTIONNAIRES: GAD7 TOTAL SCORE: 4

## 2021-01-02 DIAGNOSIS — I10 ESSENTIAL HYPERTENSION WITH GOAL BLOOD PRESSURE LESS THAN 140/90: ICD-10-CM

## 2021-01-04 RX ORDER — METOPROLOL SUCCINATE 25 MG/1
TABLET, EXTENDED RELEASE ORAL
Qty: 90 TABLET | Refills: 2 | Status: SHIPPED | OUTPATIENT
Start: 2021-01-04 | End: 2021-07-06

## 2021-01-04 NOTE — TELEPHONE ENCOUNTER
Prescription approved per [facility] Refill Protocol.    Nadeem Conti RN

## 2021-01-15 ENCOUNTER — HEALTH MAINTENANCE LETTER (OUTPATIENT)
Age: 56
End: 2021-01-15

## 2021-01-27 ENCOUNTER — MYC MEDICAL ADVICE (OUTPATIENT)
Dept: FAMILY MEDICINE | Facility: CLINIC | Age: 56
End: 2021-01-27

## 2021-01-28 ENCOUNTER — VIRTUAL VISIT (OUTPATIENT)
Dept: FAMILY MEDICINE | Facility: CLINIC | Age: 56
End: 2021-01-28
Payer: COMMERCIAL

## 2021-01-28 DIAGNOSIS — C50.911 MALIGNANT NEOPLASM OF RIGHT FEMALE BREAST, UNSPECIFIED ESTROGEN RECEPTOR STATUS, UNSPECIFIED SITE OF BREAST (H): ICD-10-CM

## 2021-01-28 DIAGNOSIS — J01.11 ACUTE RECURRENT FRONTAL SINUSITIS: Primary | ICD-10-CM

## 2021-01-28 DIAGNOSIS — B37.31 YEAST INFECTION OF THE VAGINA: ICD-10-CM

## 2021-01-28 DIAGNOSIS — I10 ESSENTIAL HYPERTENSION WITH GOAL BLOOD PRESSURE LESS THAN 140/90: ICD-10-CM

## 2021-01-28 DIAGNOSIS — F32.0 MILD MAJOR DEPRESSION (H): ICD-10-CM

## 2021-01-28 PROCEDURE — 99214 OFFICE O/P EST MOD 30 MIN: CPT | Mod: 95 | Performed by: NURSE PRACTITIONER

## 2021-01-28 RX ORDER — FLUCONAZOLE 150 MG/1
TABLET ORAL
Qty: 2 TABLET | Refills: 0 | Status: SHIPPED | OUTPATIENT
Start: 2021-01-28 | End: 2022-06-03

## 2021-01-28 NOTE — PROGRESS NOTES
Alison is a 55 year old who is being evaluated via a billable telephone visit.      What phone number would you like to be contacted at? [phone]  How would you like to obtain your AVS? MyChart     Assessment & Plan     Acute recurrent frontal sinusitis:  Continue flonase and saline nasal spray, encouraged increase in fluid intake as well as ayr saline nasal gel for dry nasal mucosa.      - amoxicillin-clavulanate (AUGMENTIN) 875-125 MG tablet; Take 1 tablet by mouth 2 times daily    Essential hypertension with goal blood pressure less than 140/90: will place a prescription in the mail for a home BP cuff, contact the clinic if consistently >140/90  - Home Blood Pressure Monitor Order for DME - ONLY FOR DME    Mild major depression (H): well controlled, last PHQ 9 of 3 in 11/2020    Malignant neoplasm of right female breast, unspecified estrogen receptor status, unspecified site of breast (H)    Yeast infection of the vagina: will prescribe just in case due to antibiotics being prescribed, she reports having needing this in the past.  - fluconazole (DIFLUCAN) 150 MG tablet; Take 1 dose today, repeat in 7 days if needed.  [id number]}     Return in about 10 months (around 11/28/2021) for Physical Exam.    Susan Haase, APRN CNP  [facility]     Alison is a 55 year old who presents for a phone visit:     HPI       Acute Illness  Acute illness concerns: sinus problem  Onset/Duration: 5 days  Symptoms:  Fever: no  Chills/Sweats: no  Headache (location?): YES  Sinus Pressure: YES  Conjunctivitis:  no  Ear Pain: YES- below the ears  Rhinorrhea: no  Congestion: YES- in the head  Sore Throat: YES- she did yesterday  Cough: no  Wheeze: no  Decreased Appetite: no  Nausea: no  Vomiting: no  Diarrhea: no  Dysuria/Freq.: YES- more urinating due to drinking more fluids  Dysuria or Hematuria: no  Fatigue/Achiness: no  Sick/Strep Exposure: no  Therapies tried and outcome: Mucinex, Flonase, and  saline helped a little    Symptoms started on 1/23 with nasal congestion, headache, ear pain, nasal drainage is thick. Is taking mucinex. Has been using ayr nasal spray and Flonase on a daily basis  Has also been using a humidifier.    Denies cough or fever.     Hypertension:  Does not check BP at home, last BP at clinic was 117/79    Review of Systems   CONSTITUTIONAL: NEGATIVE for fever, chills, change in weight  ENT/MOUTH: see HPI  RESP: NEGATIVE for significant cough or SOB  CV: NEGATIVE for chest pain, palpitations or peripheral edema  PSYCHIATRIC: NEGATIVE for changes in mood or affect      Objective           Vitals:  No vitals were obtained today due to virtual visit.    Physical Exam   PSYCH: Alert and oriented times 3; coherent speech, normal   rate and volume, able to articulate logical thoughts, able   to abstract reason, no tangential thoughts, no hallucinations   or delusions  Her affect is normal  RESP: No cough, no audible wheezing, able to talk in full sentences  Remainder of exam unable to be completed due to telephone visits    Phone call duration: 12  minutes   Wound Care: No ointment

## 2021-01-28 NOTE — TELEPHONE ENCOUNTER
Please ask Alison to set up an e visit or add her on at noon for a phone visit.  Thanks,  Susan Haase, CNP

## 2021-02-01 ENCOUNTER — MYC MEDICAL ADVICE (OUTPATIENT)
Dept: FAMILY MEDICINE | Facility: CLINIC | Age: 56
End: 2021-02-01

## 2021-02-01 DIAGNOSIS — J01.90 ACUTE NON-RECURRENT SINUSITIS, UNSPECIFIED LOCATION: Primary | ICD-10-CM

## 2021-02-01 RX ORDER — AMOXICILLIN 875 MG
875 TABLET ORAL 2 TIMES DAILY
Qty: 14 TABLET | Refills: 0 | Status: SHIPPED | OUTPATIENT
Start: 2021-02-01 | End: 2021-10-27

## 2021-04-13 ENCOUNTER — MYC MEDICAL ADVICE (OUTPATIENT)
Dept: FAMILY MEDICINE | Facility: CLINIC | Age: 56
End: 2021-04-13

## 2021-07-06 DIAGNOSIS — I10 ESSENTIAL HYPERTENSION WITH GOAL BLOOD PRESSURE LESS THAN 140/90: ICD-10-CM

## 2021-07-07 RX ORDER — LOSARTAN POTASSIUM AND HYDROCHLOROTHIAZIDE 25; 100 MG/1; MG/1
1 TABLET ORAL DAILY
Qty: 90 TABLET | Refills: 0 | Status: SHIPPED | OUTPATIENT
Start: 2021-07-07 | End: 2021-09-28

## 2021-07-07 RX ORDER — METOPROLOL SUCCINATE 25 MG/1
25 TABLET, EXTENDED RELEASE ORAL DAILY
Qty: 90 TABLET | Refills: 2 | Status: SHIPPED | OUTPATIENT
Start: 2021-07-07 | End: 2021-11-02

## 2021-07-07 NOTE — TELEPHONE ENCOUNTER
Prescription approved per [facility] Refill Protocol.  BIPIN McduffieN, RN  [facility]

## 2021-07-07 NOTE — TELEPHONE ENCOUNTER
Routing refill request to provider for review/approval because:  Labs out of range:  K  Lab Test 11/19/20  1407   POTASSIUM 3.3*     Nancy Holcomb, BIPINN, RN  [facility]

## 2021-09-04 ENCOUNTER — HEALTH MAINTENANCE LETTER (OUTPATIENT)
Age: 56
End: 2021-09-04

## 2021-09-27 DIAGNOSIS — I10 ESSENTIAL HYPERTENSION WITH GOAL BLOOD PRESSURE LESS THAN 140/90: ICD-10-CM

## 2021-09-28 RX ORDER — LOSARTAN POTASSIUM AND HYDROCHLOROTHIAZIDE 25; 100 MG/1; MG/1
1 TABLET ORAL DAILY
Qty: 90 TABLET | Refills: 0 | Status: SHIPPED | OUTPATIENT
Start: 2021-09-28 | End: 2021-11-02

## 2021-09-28 NOTE — TELEPHONE ENCOUNTER
Routing refill request to provider for review/approval because:  Labs out of range:  potassium    Potassium   Date Value Ref Range Status   11/19/2020 3.3 (L) 3.4 - 5.3 mmol/L Killian LOVING RN

## 2021-09-29 NOTE — TELEPHONE ENCOUNTER
Please call Alison and ask her to set up an appt with me in the next month for a physical or BP check, she is also due for fasting labs at that time.  A limited refill of losartan and hydrochlorothiazide has been placed.  Thanks,  Susan Haase, CNP

## 2021-10-02 ENCOUNTER — IMMUNIZATION (OUTPATIENT)
Dept: FAMILY MEDICINE | Facility: CLINIC | Age: 56
End: 2021-10-02
Payer: COMMERCIAL

## 2021-10-02 PROCEDURE — 90682 RIV4 VACC RECOMBINANT DNA IM: CPT

## 2021-10-02 PROCEDURE — 90471 IMMUNIZATION ADMIN: CPT

## 2021-10-27 ENCOUNTER — E-VISIT (OUTPATIENT)
Dept: URGENT CARE | Facility: CLINIC | Age: 56
End: 2021-10-27
Payer: COMMERCIAL

## 2021-10-27 DIAGNOSIS — J20.9 ACUTE BRONCHITIS, UNSPECIFIED ORGANISM: ICD-10-CM

## 2021-10-27 DIAGNOSIS — J01.11 ACUTE RECURRENT FRONTAL SINUSITIS: Primary | ICD-10-CM

## 2021-10-27 DIAGNOSIS — B37.31 YEAST INFECTION OF THE VAGINA: ICD-10-CM

## 2021-10-27 PROCEDURE — 99421 OL DIG E/M SVC 5-10 MIN: CPT

## 2021-10-27 RX ORDER — ALBUTEROL SULFATE 90 UG/1
2 AEROSOL, METERED RESPIRATORY (INHALATION) EVERY 4 HOURS PRN
Qty: 8.5 G | Refills: 4 | Status: SHIPPED | OUTPATIENT
Start: 2021-10-27 | End: 2023-08-11

## 2021-10-27 RX ORDER — FLUCONAZOLE 150 MG/1
150 TABLET ORAL ONCE
Qty: 1 TABLET | Refills: 0 | Status: SHIPPED | OUTPATIENT
Start: 2021-10-27 | End: 2021-10-27

## 2021-11-01 ENCOUNTER — TELEPHONE (OUTPATIENT)
Dept: FAMILY MEDICINE | Facility: CLINIC | Age: 56
End: 2021-11-01

## 2021-11-01 NOTE — TELEPHONE ENCOUNTER
Patient called, stating has an in-person visit with PCP tomorrow 11/2/2021.    Patient wanting to make sure okay to attend appointment or if needs to reschedule. Patient states that she does not believe she has been having a fever, recently had a virtual visit with PCP for a sinus infection, was prescribed antibiotics. Patient wanting to be sure due to current COVID-19 guidelines.     Routing to provider, please advise.     Nancy Holcomb, BIPINN, RN  [facility]

## 2021-11-02 ENCOUNTER — OFFICE VISIT (OUTPATIENT)
Dept: FAMILY MEDICINE | Facility: CLINIC | Age: 56
End: 2021-11-02
Payer: COMMERCIAL

## 2021-11-02 VITALS
HEART RATE: 83 BPM | RESPIRATION RATE: 18 BRPM | OXYGEN SATURATION: 99 % | HEIGHT: 65 IN | BODY MASS INDEX: 36.65 KG/M2 | WEIGHT: 220 LBS | DIASTOLIC BLOOD PRESSURE: 82 MMHG | TEMPERATURE: 98.2 F | SYSTOLIC BLOOD PRESSURE: 136 MMHG

## 2021-11-02 DIAGNOSIS — Z12.11 SCREEN FOR COLON CANCER: ICD-10-CM

## 2021-11-02 DIAGNOSIS — G43.009 MIGRAINE WITHOUT AURA AND WITHOUT STATUS MIGRAINOSUS, NOT INTRACTABLE: ICD-10-CM

## 2021-11-02 DIAGNOSIS — E66.01 MORBID OBESITY (H): ICD-10-CM

## 2021-11-02 DIAGNOSIS — I10 ESSENTIAL HYPERTENSION WITH GOAL BLOOD PRESSURE LESS THAN 140/90: ICD-10-CM

## 2021-11-02 DIAGNOSIS — R91.8 PULMONARY NODULES: ICD-10-CM

## 2021-11-02 DIAGNOSIS — Z00.00 ROUTINE GENERAL MEDICAL EXAMINATION AT A HEALTH CARE FACILITY: Primary | ICD-10-CM

## 2021-11-02 DIAGNOSIS — Z13.220 SCREENING FOR HYPERLIPIDEMIA: ICD-10-CM

## 2021-11-02 DIAGNOSIS — Z98.82 S/P BILATERAL BREAST IMPLANTS: ICD-10-CM

## 2021-11-02 DIAGNOSIS — R73.09 ELEVATED GLUCOSE: ICD-10-CM

## 2021-11-02 LAB
BASOPHILS # BLD AUTO: 0 10E3/UL (ref 0–0.2)
BASOPHILS NFR BLD AUTO: 0 %
CRP SERPL-MCNC: 8.7 MG/L (ref 0–8)
EOSINOPHIL # BLD AUTO: 0.2 10E3/UL (ref 0–0.7)
EOSINOPHIL NFR BLD AUTO: 2 %
ERYTHROCYTE [DISTWIDTH] IN BLOOD BY AUTOMATED COUNT: 12.9 % (ref 10–15)
ERYTHROCYTE [SEDIMENTATION RATE] IN BLOOD BY WESTERGREN METHOD: 20 MM/HR (ref 0–30)
HCT VFR BLD AUTO: 39.5 % (ref 35–47)
HGB BLD-MCNC: 13.2 G/DL (ref 11.7–15.7)
LYMPHOCYTES # BLD AUTO: 2.5 10E3/UL (ref 0.8–5.3)
LYMPHOCYTES NFR BLD AUTO: 25 %
MCH RBC QN AUTO: 29.9 PG (ref 26.5–33)
MCHC RBC AUTO-ENTMCNC: 33.4 G/DL (ref 31.5–36.5)
MCV RBC AUTO: 90 FL (ref 78–100)
MONOCYTES # BLD AUTO: 0.7 10E3/UL (ref 0–1.3)
MONOCYTES NFR BLD AUTO: 7 %
NEUTROPHILS # BLD AUTO: 6.6 10E3/UL (ref 1.6–8.3)
NEUTROPHILS NFR BLD AUTO: 66 %
PLATELET # BLD AUTO: 392 10E3/UL (ref 150–450)
RBC # BLD AUTO: 4.41 10E6/UL (ref 3.8–5.2)
WBC # BLD AUTO: 10 10E3/UL (ref 4–11)

## 2021-11-02 PROCEDURE — 86140 C-REACTIVE PROTEIN: CPT | Performed by: NURSE PRACTITIONER

## 2021-11-02 PROCEDURE — 80050 GENERAL HEALTH PANEL: CPT | Performed by: NURSE PRACTITIONER

## 2021-11-02 PROCEDURE — 99396 PREV VISIT EST AGE 40-64: CPT | Performed by: NURSE PRACTITIONER

## 2021-11-02 PROCEDURE — 85652 RBC SED RATE AUTOMATED: CPT | Performed by: NURSE PRACTITIONER

## 2021-11-02 PROCEDURE — 99213 OFFICE O/P EST LOW 20 MIN: CPT | Mod: 25 | Performed by: NURSE PRACTITIONER

## 2021-11-02 PROCEDURE — 80061 LIPID PANEL: CPT | Performed by: NURSE PRACTITIONER

## 2021-11-02 PROCEDURE — 83036 HEMOGLOBIN GLYCOSYLATED A1C: CPT | Performed by: NURSE PRACTITIONER

## 2021-11-02 PROCEDURE — 36415 COLL VENOUS BLD VENIPUNCTURE: CPT | Performed by: NURSE PRACTITIONER

## 2021-11-02 RX ORDER — METOPROLOL SUCCINATE 25 MG/1
25 TABLET, EXTENDED RELEASE ORAL DAILY
Qty: 90 TABLET | Refills: 3 | Status: SHIPPED | OUTPATIENT
Start: 2021-11-02 | End: 2022-11-02

## 2021-11-02 RX ORDER — SUMATRIPTAN 100 MG/1
TABLET, FILM COATED ORAL
Qty: 6 TABLET | Refills: 1 | Status: SHIPPED | OUTPATIENT
Start: 2021-11-02 | End: 2022-11-02

## 2021-11-02 RX ORDER — LOSARTAN POTASSIUM AND HYDROCHLOROTHIAZIDE 25; 100 MG/1; MG/1
1 TABLET ORAL DAILY
Qty: 90 TABLET | Refills: 3 | Status: SHIPPED | OUTPATIENT
Start: 2021-11-02 | End: 2022-11-02

## 2021-11-02 ASSESSMENT — ENCOUNTER SYMPTOMS
ABDOMINAL PAIN: 0
WEAKNESS: 0
PALPITATIONS: 0
HEMATOCHEZIA: 0
JOINT SWELLING: 0
NAUSEA: 0
BREAST MASS: 0
COUGH: 0
MYALGIAS: 0
FEVER: 0
EYE PAIN: 0
DIZZINESS: 0
SHORTNESS OF BREATH: 0
SORE THROAT: 0
CONSTIPATION: 0
NERVOUS/ANXIOUS: 0
DYSURIA: 0
HEMATURIA: 0
DIARRHEA: 0
FREQUENCY: 0
HEADACHES: 0
ARTHRALGIAS: 0
HEARTBURN: 0
CHILLS: 0
PARESTHESIAS: 0

## 2021-11-02 ASSESSMENT — PATIENT HEALTH QUESTIONNAIRE - PHQ9: SUM OF ALL RESPONSES TO PHQ QUESTIONS 1-9: 3

## 2021-11-02 ASSESSMENT — MIFFLIN-ST. JEOR: SCORE: 1588.79

## 2021-11-02 NOTE — PROGRESS NOTES
SUBJECTIVE:   CC: Alison Barfield is an 56 year old woman who presents for preventive health visit.       Patient has been advised of split billing requirements and indicates understanding: Yes  Healthy Habits:     Getting at least 3 servings of Calcium per day:  Yes    Bi-annual eye exam:  Yes    Dental care twice a year:  NO    Sleep apnea or symptoms of sleep apnea:  None    Diet:  Regular (no restrictions)    Frequency of exercise:  2-3 days/week    Duration of exercise:  30-45 minutes    Taking medications regularly:  Yes    Medication side effects:  None    PHQ-2 Total Score: 0    Additional concerns today:  No  Cervical cancer screening: history of hysterectomy.    Breast cancer screening: bilateral mastectomy with reconstruction in 2010.   Breast pain:  Has occasional sharp pains along the outer aspects of both breasts, is not sure if this is related to scar tissue or implants.      Colon cancer screening: needs FIT test.      Today's PHQ-2 Score:   PHQ-2 ( 1999 Pfizer) 11/2/2021   Q1: Little interest or pleasure in doing things 0   Q2: Feeling down, depressed or hopeless 0   PHQ-2 Score 0   Q1: Little interest or pleasure in doing things Not at all   Q2: Feeling down, depressed or hopeless Not at all   PHQ-2 Score 0       Abuse: Current or Past (Physical, Sexual or Emotional) - No  Do you feel safe in your environment? Yes    Have you ever done Advance Care Planning? (For example, a Health Directive, POLST, or a discussion with a medical provider or your loved ones about your wishes): No, advance care planning information given to patient to review.  Patient declined advance care planning discussion at this time.    Social History     Tobacco Use     Smoking status: Former Smoker     Types: Cigarettes     Smokeless tobacco: Never Used     Tobacco comment: Quit in 1998   Substance Use Topics     Alcohol use: No     Comment: none       Alcohol Use 11/2/2021   Prescreen: >3 drinks/day or >7 drinks/week? No    Prescreen: >3 drinks/day or >7 drinks/week? -     Reviewed orders with patient.  Reviewed health maintenance and updated orders accordingly - Yes  Lab work is in process  BP Readings from Last 3 Encounters:   21 136/82   20 117/79   19 123/79    Wt Readings from Last 3 Encounters:   21 99.8 kg (220 lb)   20 98.7 kg (217 lb 9.6 oz)   19 94.8 kg (209 lb)                  Patient Active Problem List   Diagnosis     Malaise and fatigue     Mild major depression (H)     Breast cyst     High triglycerides     Ductal carcinoma in situ of breast     CARDIOVASCULAR SCREENING; LDL GOAL LESS THAN 130     Breast cancer (H)     Health Care Home     Elevated rheumatoid factor     Migraine     S/P abdominal supracervical subtotal hysterectomy     Pain of left thumb     Pulmonary nodules     Essential hypertension with goal blood pressure less than 140/90     Past Surgical History:   Procedure Laterality Date     BREAST SURGERY      masectomy-double     EXCISE MASS NECK  2012    Procedure: EXCISE MASS NECK;  Excision of Left Deep Neck Cyst;  Surgeon: Elmer Hernandez MD;  Location: [facility] OR     HYSTERECTOMY, PAP NO LONGER INDICATED       kidney stone extraction       and      LAPAROSCOPIC ASSISTED HYSTERECTOMY VAGINAL, BILATERAL SALPINGO-OOPHORECTOMY, COMBINED      hysterectomy - 2010 - [facility]     MASTECTOMY, BILATERAL       ZZC NONSPECIFIC PROCEDURE      Child birth x 3       Social History     Tobacco Use     Smoking status: Former Smoker     Types: Cigarettes     Smokeless tobacco: Never Used     Tobacco comment: Quit in    Substance Use Topics     Alcohol use: No     Comment: none     Family History   Problem Relation Age of Onset     Cancer Father         lung CA     Other - See Comments Father          from blood clot     Cancer Brother         squamous cell of the jaw     Hypertension Maternal Grandmother      Cerebrovascular Disease Maternal  Grandmother          Current Outpatient Medications   Medication Sig Dispense Refill     Acetaminophen (TYLENOL PO) Take 1,000 mg by mouth as needed.       albuterol (PROAIR HFA/PROVENTIL HFA/VENTOLIN HFA) 108 (90 Base) MCG/ACT inhaler Inhale 2 puffs into the lungs every 4 hours as needed for shortness of breath / dyspnea or wheezing 8.5 g 4     amoxicillin-clavulanate (AUGMENTIN) 875-125 MG tablet Take 1 tablet by mouth 2 times daily 20 tablet 0     fluconazole (DIFLUCAN) 150 MG tablet Take 1 dose today, repeat in 7 days if needed. 2 tablet 0     losartan-hydrochlorothiazide (HYZAAR) 100-25 MG tablet TAKE 1 TABLET BY MOUTH DAILY 90 tablet 0     metoprolol succinate ER (TOPROL-XL) 25 MG 24 hr tablet Take 1 tablet (25 mg) by mouth daily 90 tablet 2     SUMAtriptan (IMITREX) 100 MG tablet Take 1 tablet by mouth. May repeat in 2 hours if needed: max 2/day; average number of headaches monthly 1 (Patient not taking: Reported on 1/28/2021) 6 tablet 1       Breast Cancer Screening:    FHS-7:   Breast CA Risk Assessment (FHS-7) 11/2/2021   Did any of your first-degree relatives have breast or ovarian cancer? Yes   Did any of your relatives have bilateral breast cancer? Unknown   Did any man in your family have breast cancer? Yes   Did any woman in your family have breast and ovarian cancer? Yes   Did any woman in your family have breast cancer before age 50 y? Yes   Do you have 2 or more relatives with breast and/or ovarian cancer? Yes   Do you have 2 or more relatives with breast and/or bowel cancer? Yes       Mammogram Screening - Alternate mammogram schedule due to breast cancer history  Pertinent mammograms are reviewed under the imaging tab.    History of abnormal Pap smear: Status post benign hysterectomy. Health Maintenance and Surgical History updated.  PAP / HPV 9/14/2010 3/24/2009   PAP (Historical) OTHER-NIL EM>40 OTHER-NIL EM>40     Reviewed and updated as needed this visit by clinical staff              "    Reviewed and updated as needed this visit by Provider                    Review of Systems   Constitutional: Negative for chills and fever.   HENT: Positive for congestion and ear pain. Negative for hearing loss and sore throat.    Eyes: Negative for pain and visual disturbance.   Respiratory: Negative for cough and shortness of breath.    Cardiovascular: Negative for chest pain, palpitations and peripheral edema.   Gastrointestinal: Negative for abdominal pain, constipation, diarrhea, heartburn, hematochezia and nausea.   Breasts:  Negative for tenderness, breast mass and discharge.   Genitourinary: Negative for dysuria, frequency, genital sores, hematuria, pelvic pain, urgency, vaginal bleeding and vaginal discharge.   Musculoskeletal: Negative for arthralgias, joint swelling and myalgias.   Skin: Negative for rash.   Neurological: Negative for dizziness, weakness, headaches and paresthesias.   Psychiatric/Behavioral: Negative for mood changes. The patient is not nervous/anxious.      OBJECTIVE:   /82   Pulse 83   Temp 98.2  F (36.8  C) (Oral)   Resp 18   Ht 1.651 m (5' 5\")   Wt 99.8 kg (220 lb)   LMP 09/09/2010   SpO2 99%   BMI 36.61 kg/m    Physical Exam  GENERAL APPEARANCE: healthy, alert and no distress  EYES: Eyes grossly normal to inspection, PERRL and conjunctivae and sclerae normal  HENT: ear canals and TM's normal, nose and mouth without ulcers or lesions, oropharynx clear and oral mucous membranes moist  NECK: no adenopathy, no asymmetry, masses, or scars and thyroid normal to palpation  RESP: lungs clear to auscultation - no rales, rhonchi or wheezes  BREAST:Bilateral breast implants   CV: regular rate and rhythm, normal S1 S2, no S3 or S4, no murmur, click or rub, no peripheral edema  ABDOMEN: soft, nontender, no hepatosplenomegaly, no masses and bowel sounds normal  MS: no musculoskeletal defects are noted and gait is age appropriate without ataxia  SKIN: no suspicious lesions or " rashes  NEURO: Normal strength and tone, sensory exam grossly normal, mentation intact and speech normal  PSYCH: mentation appears normal and affect normal/bright        ASSESSMENT/PLAN:   Alison was seen today for physical.    Diagnoses and all orders for this visit:    Routine general medical examination at a health care facility  -     Lipid panel reflex to direct LDL Fasting; Future  -     CBC with Platelets & Differential; Future  -     Comprehensive metabolic panel; Future  -     CRP, inflammation; Future  -     ESR: Erythrocyte sedimentation rate; Future  -     TSH with free T4 reflex; Future  -     Lipid panel reflex to direct LDL Fasting  -     CBC with Platelets & Differential  -     Comprehensive metabolic panel  -     CRP, inflammation  -     ESR: Erythrocyte sedimentation rate  -     TSH with free T4 reflex    Essential hypertension with goal blood pressure less than 140/90:  /82, well controlled.  Refill below.  -     losartan-hydrochlorothiazide (HYZAAR) 100-25 MG tablet; Take 1 tablet by mouth daily  -     metoprolol succinate ER (TOPROL-XL) 25 MG 24 hr tablet; Take 1 tablet (25 mg) by mouth daily    Screen for colon cancer  -     Fecal colorectal cancer screen FIT - Future (S+30); Future  -     Fecal colorectal cancer screen FIT - Future (S+30)    Screening for hyperlipidemia: Lipid panel in process.     Morbid obesity (H): discussed diet and exercise.     Pulmonary nodules:  Right middle lobe nodule on series 3, image 29 measures 0.5 cm completed in 2017, recommended 1 year follow up which has not been completed.   -     CT Chest w Contrast; Future    S/P bilateral breast implants: sharp pain along outer aspect of breast occasionally, refer back to breast reconstructive surgeon for evaluation of implants  -     Plastic Surgery Referral; Future    Migraine without aura and without status migrainosus, not intractable: has not needed in the last year  -     SUMAtriptan (IMITREX) 100 MG tablet;  "Take 1 tablet by mouth. May repeat in 2 hours if needed: max 2/day; average number of headaches monthly 1    Other orders  -     REVIEW OF HEALTH MAINTENANCE PROTOCOL ORDERS        Patient has been advised of split billing requirements and indicates understanding:   COUNSELING:  Reviewed preventive health counseling, as reflected in patient instructions       Regular exercise       Healthy diet/nutrition    Estimated body mass index is 35.66 kg/m  as calculated from the following:    Height as of 11/19/20: 1.664 m (5' 5.5\").    Weight as of 11/19/20: 98.7 kg (217 lb 9.6 oz).    Weight management plan: Discussed healthy diet and exercise guidelines    She reports that she has quit smoking. Her smoking use included cigarettes. She has never used smokeless tobacco.    Counseling Resources:  ATP IV Guidelines  Pooled Cohorts Equation Calculator  Breast Cancer Risk Calculator  BRCA-Related Cancer Risk Assessment: FHS-7 Tool  FRAX Risk Assessment  ICSI Preventive Guidelines  Dietary Guidelines for Americans, 2010  USDA's MyPlate  ASA Prophylaxis  Lung CA Screening  Follow up in 1 year, sooner as needed.   Susan Haase, APRN [facility]"

## 2021-11-03 DIAGNOSIS — R73.09 ELEVATED GLUCOSE: Primary | ICD-10-CM

## 2021-11-03 LAB
ALBUMIN SERPL-MCNC: 3.9 G/DL (ref 3.4–5)
ALP SERPL-CCNC: 80 U/L (ref 40–150)
ALT SERPL W P-5'-P-CCNC: 55 U/L (ref 0–50)
ANION GAP SERPL CALCULATED.3IONS-SCNC: 6 MMOL/L (ref 3–14)
AST SERPL W P-5'-P-CCNC: 40 U/L (ref 0–45)
BILIRUB SERPL-MCNC: 0.4 MG/DL (ref 0.2–1.3)
BUN SERPL-MCNC: 15 MG/DL (ref 7–30)
CALCIUM SERPL-MCNC: 8.8 MG/DL (ref 8.5–10.1)
CHLORIDE BLD-SCNC: 102 MMOL/L (ref 94–109)
CHOLEST SERPL-MCNC: 234 MG/DL
CO2 SERPL-SCNC: 28 MMOL/L (ref 20–32)
CREAT SERPL-MCNC: 0.63 MG/DL (ref 0.52–1.04)
FASTING STATUS PATIENT QL REPORTED: YES
GFR SERPL CREATININE-BSD FRML MDRD: >90 ML/MIN/1.73M2
GLUCOSE BLD-MCNC: 119 MG/DL (ref 70–99)
HDLC SERPL-MCNC: 43 MG/DL
LDLC SERPL CALC-MCNC: 145 MG/DL
NONHDLC SERPL-MCNC: 191 MG/DL
POTASSIUM BLD-SCNC: 3.4 MMOL/L (ref 3.4–5.3)
PROT SERPL-MCNC: 8.2 G/DL (ref 6.8–8.8)
SODIUM SERPL-SCNC: 136 MMOL/L (ref 133–144)
TRIGL SERPL-MCNC: 228 MG/DL
TSH SERPL DL<=0.005 MIU/L-ACNC: 1.49 MU/L (ref 0.4–4)

## 2021-11-04 LAB — HBA1C MFR BLD: 6 % (ref 0–5.6)

## 2021-12-22 DIAGNOSIS — I10 ESSENTIAL HYPERTENSION WITH GOAL BLOOD PRESSURE LESS THAN 140/90: ICD-10-CM

## 2021-12-23 RX ORDER — LOSARTAN POTASSIUM AND HYDROCHLOROTHIAZIDE 25; 100 MG/1; MG/1
1 TABLET ORAL DAILY
Qty: 90 TABLET | Refills: 3 | OUTPATIENT
Start: 2021-12-23

## 2021-12-23 NOTE — TELEPHONE ENCOUNTER
Already refilled. losartan-hydrochlorothiazide (HYZAAR) 100-25 MG tablet 90 tablet 3 refills 11/2/2021   Aleja Hawkins RN

## 2022-05-31 ENCOUNTER — HOSPITAL ENCOUNTER (OUTPATIENT)
Dept: MRI IMAGING | Facility: CLINIC | Age: 57
Discharge: HOME | End: 2022-05-31
Attending: PLASTIC SURGERY | Admitting: PLASTIC SURGERY
Payer: COMMERCIAL

## 2022-05-31 ENCOUNTER — HOSPITAL ENCOUNTER (OUTPATIENT)
Dept: CT IMAGING | Facility: CLINIC | Age: 57
Discharge: HOME | End: 2022-05-31
Attending: NURSE PRACTITIONER | Admitting: NURSE PRACTITIONER
Payer: COMMERCIAL

## 2022-05-31 DIAGNOSIS — R91.8 PULMONARY NODULES: ICD-10-CM

## 2022-05-31 DIAGNOSIS — Z85.3 H/O MALIGNANT NEOPLASM OF FEMALE BREAST: ICD-10-CM

## 2022-05-31 DIAGNOSIS — Z90.13 HISTORY OF BILATERAL MASTECTOMY: ICD-10-CM

## 2022-05-31 PROCEDURE — 71250 CT THORAX DX C-: CPT

## 2022-05-31 PROCEDURE — A9585 GADOBUTROL INJECTION: HCPCS | Performed by: PLASTIC SURGERY

## 2022-05-31 PROCEDURE — 77049 MRI BREAST C-+ W/CAD BI: CPT

## 2022-05-31 PROCEDURE — 255N000002 HC RX 255 OP 636: Performed by: PLASTIC SURGERY

## 2022-05-31 RX ORDER — GADOBUTROL 604.72 MG/ML
10 INJECTION INTRAVENOUS ONCE
Status: COMPLETED | OUTPATIENT
Start: 2022-05-31 | End: 2022-05-31

## 2022-05-31 RX ADMIN — GADOBUTROL 10 ML: 604.72 INJECTION INTRAVENOUS at 13:54

## 2022-06-01 DIAGNOSIS — E66.01 MORBID OBESITY (H): Primary | ICD-10-CM

## 2022-06-01 DIAGNOSIS — I25.10 CORONARY ARTERY CALCIFICATION: ICD-10-CM

## 2022-06-03 ENCOUNTER — VIRTUAL VISIT (OUTPATIENT)
Dept: CARDIOLOGY | Facility: CLINIC | Age: 57
End: 2022-06-03
Attending: PHYSICIAN ASSISTANT

## 2022-06-03 DIAGNOSIS — R73.9 HYPERGLYCEMIA: ICD-10-CM

## 2022-06-03 DIAGNOSIS — I25.10 CORONARY ARTERY CALCIFICATION: ICD-10-CM

## 2022-06-03 DIAGNOSIS — E78.2 MIXED HYPERLIPIDEMIA: Primary | ICD-10-CM

## 2022-06-03 DIAGNOSIS — I25.10 CORONARY ARTERY DISEASE INVOLVING NATIVE CORONARY ARTERY OF NATIVE HEART WITHOUT ANGINA PECTORIS: ICD-10-CM

## 2022-06-03 DIAGNOSIS — E66.01 MORBID OBESITY (H): ICD-10-CM

## 2022-06-03 DIAGNOSIS — I10 ESSENTIAL HYPERTENSION: ICD-10-CM

## 2022-06-03 PROCEDURE — 99203 OFFICE O/P NEW LOW 30 MIN: CPT | Mod: GT | Performed by: INTERNAL MEDICINE

## 2022-06-03 RX ORDER — ROSUVASTATIN CALCIUM 20 MG/1
20 TABLET, COATED ORAL DAILY
Qty: 30 TABLET | Refills: 11 | Status: SHIPPED | OUTPATIENT
Start: 2022-06-03 | End: 2023-08-10

## 2022-06-03 NOTE — LETTER
6/3/2022    Susan Haase, APRN CNP  [address]  [facility] [address]    RE: Alison Barfield       Dear Colleague,     I [facility] the [facility].  Alison is a 56 year old who is being evaluated via a billable video visit.      How would you like to obtain your AVS? Mail a copy  If the video visit is dropped, the invitation should be resent by: Text to cell phone: [phone]  Will anyone else be joining your video visit? No      Video Start Time: 2:50  Video-Visit Details  General:  no apparent distress, obeses, sitting upright.  ENT/Mouth:  membranes moist, no nasal discharge.  Normal head shape, no apparent injury or laceration.  Eyes:  no scleral icterus, normal conjunctivae.  No observed jaundice.  Neck:  no apparent neck swelling.   Chest/Lungs:  No breathing difficulty while speaking.  No audible wheezing.  No cough during conversation.  Cardiovascular:  No obviously elevated jugular venous pressure.  No apparent edema bilaterally in LE.   Abdomen:  no obvious abdominal distention.   Extremities:  no apparent cyanosis.  Skin:  no xanthelasma.  No facial lacerations.  Neurologic:  Normal arm motion bilateral, no tremors.    Psychiatric:  Alert and oriented x3, calm demeanor    The rest of the comprehensive physical examination is deferred due to public health emergency video visit restrictions.    Type of service:  Video Visit    Video End Time:3:12    Originating Location (pt. Location): Home    Distant Location (provider location):  [facility]     Platform used for Video Visit: [employer] [phone]    Review Of Systems  Skin: negative  Eyes: negative  Ears/Nose/Throat: negative  Respiratory: negative  Cardiovascular: negative  Gastrointestinal: negative  Genitourinary: negative  Musculoskeletal: negative  Neurologic: negative  Psychiatric: negative  Hematologic/Lymphatic/Immunologic: negative  Endocrine:  negative    Patient reported vitals:  BP: N/A  Heart rate: N/A  Weight: 191lbs    DEJON Cisneros    Service Date: 06/03/2022    REFERRING HEALTHCARE PROVIDER:  Susan Haase, APRN, CNP    INDICATION FOR CARDIAC CONSULTATION:    1.  Coronary artery calcification.    HISTORY OF PRESENT ILLNESS:  It is my pleasure to see your patient, Alison Barfield, by video visit using the [employer] platform.  We are using the video visit because of the ongoing COVID-19 pandemic.    It is my pleasure to see your patient, Alison Barfield, who is a 56-year-old patient who had a CT of her chest performed on 05/31 of this year.  The reason for this was lung nodules.  It was noted, however, that the patient had moderate atherosclerotic vascular calcification on the CT of her chest.  I reviewed the CT scan here today, and she does appear to have a significant amount of calcification in the proximal to mid left anterior descending artery, possibly also the distal left main and also calcification in the proximal circumflex coronary artery.  She also has calcification in the ostial portion of the right coronary artery and to a lesser extent in the mid portion of the right coronary artery, also.  This means that the patient has coronary artery disease.  The functionality of that plaque is unknown at this stage.  On discussion with the patient today, she has not complained of any chest pains, any chest pressure or unusual shortness of breath.  She is not complaining of arm, throat or jaw discomfort with exertion.  The patient has risk factors for coronary artery disease.  She has been treated for hypertension since she was in her teens.  She did smoke until 1998 and stopped smoking completely after that.  She does have hyperlipidemia.  Her lipid profile, which was drawn on 11/02/2021, shows that her LDL cholesterol is abnormal at 145, she has HDL deficiency at 43, and she has hypertriglyceridemia at 228 with a total cholesterol of 234.  She is on no  therapy for her lipids.  She does not have a formal diagnosis of diabetes, but I do note that her glucose levels have been on the higher side and her fasting glucose levels were 119 on 11/02/2021 and were 133 on 11/19/2020.  The patient is also significantly overweight with moderate obesity.  She weighs 220 pounds and has a BMI of 37.    IMPRESSION:    1.  Coronary artery disease.  The patient clearly has plaque involving the LAD, circumflex and right coronary artery with a predominant amount of calcification being in the left anterior descending artery.  However, the patient appears to be asymptomatic with this.  2.  The patient has multiple risk factors for coronary artery disease.  3.  Mixed hyperlipidemia as described above.  4.  Hyperglycemia.  Almost certainly has metabolic syndrome associated with her obesity.  5.  Obesity with a BMI of 37, which is moderate obesity but close to being severe obesity with 38 being the cutoff for severe.    PLAN:    1.  Firstly, we need to know the functionality of that coronary calcification.  In other words, we need to know if that is ischemic or not, so we will perform a stress EKG.  The patient's EKG back in 2012 was described as being normal and she tells me that she is able to walk on a treadmill, so we will perform a 12-lead EKG.  We are finding that the insurance companies now are not paying for stress imaging studies, they are asking us to go with the stress EKGs unless the patient cannot walk or has an uninterpretable EKG, such as having, let's say, a left bundle branch block or preexisting T-wave changes.  Obviously, if the stress EKG is abnormal, we will go to an imaging study.  2.  The patient should start on a baby aspirin 81 mg per day, as she has coronary artery disease and therefore she falls into that category of patients that should be on an aspirin a day.  3.  Finally, this patient should be on high-intensity statin therapy, as the patient clearly has  coronary artery disease, and her LDL goal will be an LDL of less than 70.  I did sense a significant amount of reticence on the patient about using statins, and she has obviously read a lot of the fake information that is out there on statins.  I did spend a long time explaining to her the benefits of statin therapy, the side effects of statin therapy including myalgias, muscle stiffness and cramping, as well as the liver dysfunction that can occur with this drug as well.  So we will start her on rosuvastatin 20 mg per day and recheck her lipids in 6 weeks' time with an ALT.  We will have the patient follow up with one of our APPs in approximately 6 weeks' time with the results of all of our investigations.  Obviously, if the stress test is abnormal, we may see her sooner than that or talk to her by video visit sooner than that.    It is my pleasure to be involved the care of this very nice patient.  This was overall a 30-minute visit, involved in reviewing all of the patient's studies, reviewing the CT in its entirety myself today, and going through her past laboratory investigations and radiological investigations.    Jeremiah Stein MD, FACC    cc:  Susan Haase, APRN, [place] [address]    Jeremiah Kelly MD, FACC        D: 2022   T: 2022   MT:     Name:     AMANDA ROB  MRN:      [id number]        Account:      [id number]   :      1965           Service Date: 2022       Document: [id number]    Thank you for allowing me to participate in the care of your patient.      Sincerely,     Jeremiah Stein MD, MD     [facility]  cc:   Laurie Madrid PA-C  [address] [place], [place] [id number]

## 2022-06-03 NOTE — PROGRESS NOTES
Alison is a 56 year old who is being evaluated via a billable video visit.      How would you like to obtain your AVS? Mail a copy  If the video visit is dropped, the invitation should be resent by: Text to cell phone: [phone]  Will anyone else be joining your video visit? No      Video Start Time: 2:50  Video-Visit Details  General:  no apparent distress, obeses, sitting upright.  ENT/Mouth:  membranes moist, no nasal discharge.  Normal head shape, no apparent injury or laceration.  Eyes:  no scleral icterus, normal conjunctivae.  No observed jaundice.  Neck:  no apparent neck swelling.   Chest/Lungs:  No breathing difficulty while speaking.  No audible wheezing.  No cough during conversation.  Cardiovascular:  No obviously elevated jugular venous pressure.  No apparent edema bilaterally in LE.   Abdomen:  no obvious abdominal distention.   Extremities:  no apparent cyanosis.  Skin:  no xanthelasma.  No facial lacerations.  Neurologic:  Normal arm motion bilateral, no tremors.    Psychiatric:  Alert and oriented x3, calm demeanor    The rest of the comprehensive physical examination is deferred due to public health emergency video visit restrictions.    Type of service:  Video Visit    Video End Time:3:12    Originating Location (pt. Location): Home    Distant Location (provider location):  [facility]     Platform used for Video Visit: [employer] [phone]    Review Of Systems  Skin: negative  Eyes: negative  Ears/Nose/Throat: negative  Respiratory: negative  Cardiovascular: negative  Gastrointestinal: negative  Genitourinary: negative  Musculoskeletal: negative  Neurologic: negative  Psychiatric: negative  Hematologic/Lymphatic/Immunologic: negative  Endocrine: negative    Patient reported vitals:  BP: N/A  Heart rate: N/A  Weight: 191lbs    [place] FLORES EMT

## 2022-06-03 NOTE — PROGRESS NOTES
Service Date: 06/03/2022    REFERRING HEALTHCARE PROVIDER:  Susan Haase, APRN, CNP    INDICATION FOR CARDIAC CONSULTATION:    1.  Coronary artery calcification.    HISTORY OF PRESENT ILLNESS:  It is my pleasure to see your patient, Alison Barfield, by video visit using the Monitor platform.  We are using the video visit because of the ongoing COVID-19 pandemic.    It is my pleasure to see your patient, Alison Barfield, who is a 56-year-old patient who had a CT of her chest performed on 05/31 of this year.  The reason for this was lung nodules.  It was noted, however, that the patient had moderate atherosclerotic vascular calcification on the CT of her chest.  I reviewed the CT scan here today, and she does appear to have a significant amount of calcification in the proximal to mid left anterior descending artery, possibly also the distal left main and also calcification in the proximal circumflex coronary artery.  She also has calcification in the ostial portion of the right coronary artery and to a lesser extent in the mid portion of the right coronary artery, also.  This means that the patient has coronary artery disease.  The functionality of that plaque is unknown at this stage.  On discussion with the patient today, she has not complained of any chest pains, any chest pressure or unusual shortness of breath.  She is not complaining of arm, throat or jaw discomfort with exertion.  The patient has risk factors for coronary artery disease.  She has been treated for hypertension since she was in her teens.  She did smoke until 1998 and stopped smoking completely after that.  She does have hyperlipidemia.  Her lipid profile, which was drawn on 11/02/2021, shows that her LDL cholesterol is abnormal at 145, she has HDL deficiency at 43, and she has hypertriglyceridemia at 228 with a total cholesterol of 234.  She is on no therapy for her lipids.  She does not have a formal diagnosis of diabetes, but I do note that her  glucose levels have been on the higher side and her fasting glucose levels were 119 on 11/02/2021 and were 133 on 11/19/2020.  The patient is also significantly overweight with moderate obesity.  She weighs 220 pounds and has a BMI of 37.    IMPRESSION:    1.  Coronary artery disease.  The patient clearly has plaque involving the LAD, circumflex and right coronary artery with a predominant amount of calcification being in the left anterior descending artery.  However, the patient appears to be asymptomatic with this.  2.  The patient has multiple risk factors for coronary artery disease.  3.  Mixed hyperlipidemia as described above.  4.  Hyperglycemia.  Almost certainly has metabolic syndrome associated with her obesity.  5.  Obesity with a BMI of 37, which is moderate obesity but close to being severe obesity with 38 being the cutoff for severe.    PLAN:    1.  Firstly, we need to know the functionality of that coronary calcification.  In other words, we need to know if that is ischemic or not, so we will perform a stress EKG.  The patient's EKG back in 2012 was described as being normal and she tells me that she is able to walk on a treadmill, so we will perform a 12-lead EKG.  We are finding that the insurance companies now are not paying for stress imaging studies, they are asking us to go with the stress EKGs unless the patient cannot walk or has an uninterpretable EKG, such as having, let's say, a left bundle branch block or preexisting T-wave changes.  Obviously, if the stress EKG is abnormal, we will go to an imaging study.  2.  The patient should start on a baby aspirin 81 mg per day, as she has coronary artery disease and therefore she falls into that category of patients that should be on an aspirin a day.  3.  Finally, this patient should be on high-intensity statin therapy, as the patient clearly has coronary artery disease, and her LDL goal will be an LDL of less than 70.  I did sense a significant  amount of reticence on the patient about using statins, and she has obviously read a lot of the fake information that is out there on statins.  I did spend a long time explaining to her the benefits of statin therapy, the side effects of statin therapy including myalgias, muscle stiffness and cramping, as well as the liver dysfunction that can occur with this drug as well.  So we will start her on rosuvastatin 20 mg per day and recheck her lipids in 6 weeks' time with an ALT.  We will have the patient follow up with one of our APPs in approximately 6 weeks' time with the results of all of our investigations.  Obviously, if the stress test is abnormal, we may see her sooner than that or talk to her by video visit sooner than that.    It is my pleasure to be involved the care of this very nice patient.  This was overall a 30-minute visit, involved in reviewing all of the patient's studies, reviewing the CT in its entirety myself today, and going through her past laboratory investigations and radiological investigations.    Jeremiah Stein MD, FACC    cc:  Susan Haase, APRN, [place] [address]    Jeremiah Kelly MD, FACC        D: 2022   T: 2022   MT: xenia    Name:     AMANDA ROB  MRN:      [id number]        Account:      [id number]   :      1965           Service Date: 2022       Document: [id number]

## 2022-08-23 ENCOUNTER — TELEPHONE (OUTPATIENT)
Dept: FAMILY MEDICINE | Facility: CLINIC | Age: 57
End: 2022-08-23

## 2022-08-23 NOTE — TELEPHONE ENCOUNTER
Reason for Call: Request for an order or referral:    Order or referral being requested: cancer screening/ gene test    Date needed: at your convenience    Has the patient been seen by the PCP for this problem? YES    Additional comments: Pt called and is wondering if she can get orders placed for a cancer screening and the common 47 panal screening gene test please advise thank you    Phone number Patient can be reached at:  Home number on file [phone] (home)    Best Time:  anytime    Can we leave a detailed message on this number?  YES    Call taken on 8/23/2022 at 4:00 PM by Sandra Nguyen

## 2022-08-25 NOTE — TELEPHONE ENCOUNTER
Left message #1 for patient to return call to triage nurse     Patient needs to establish care with another provider in clinic as pcp has left, physical due November 2022    Will need visit with provider to discuss request and orders she would like to determine if they are needed/appropriate/covered by insurance     Erinn Maki, Registered Nurse  [facility]

## 2022-08-26 NOTE — TELEPHONE ENCOUNTER
Called pt to relay message below. Scheduled pt for 8/31/22 with Romel Pagan PA-C. Patient was given an opportunity to ask questions, verbalized understanding of plan, and is agreeable.    Josie Arora RN

## 2022-08-31 ENCOUNTER — OFFICE VISIT (OUTPATIENT)
Dept: FAMILY MEDICINE | Facility: CLINIC | Age: 57
End: 2022-08-31
Payer: COMMERCIAL

## 2022-08-31 ENCOUNTER — PATIENT OUTREACH (OUTPATIENT)
Dept: ONCOLOGY | Facility: CLINIC | Age: 57
End: 2022-08-31

## 2022-08-31 VITALS
WEIGHT: 205.4 LBS | RESPIRATION RATE: 12 BRPM | OXYGEN SATURATION: 98 % | TEMPERATURE: 98.4 F | SYSTOLIC BLOOD PRESSURE: 102 MMHG | DIASTOLIC BLOOD PRESSURE: 58 MMHG | HEART RATE: 75 BPM | BODY MASS INDEX: 34.18 KG/M2

## 2022-08-31 DIAGNOSIS — C50.911 MALIGNANT NEOPLASM OF RIGHT FEMALE BREAST, UNSPECIFIED ESTROGEN RECEPTOR STATUS, UNSPECIFIED SITE OF BREAST (H): Primary | ICD-10-CM

## 2022-08-31 DIAGNOSIS — Z12.11 SCREEN FOR COLON CANCER: ICD-10-CM

## 2022-08-31 PROCEDURE — 99213 OFFICE O/P EST LOW 20 MIN: CPT | Performed by: PHYSICIAN ASSISTANT

## 2022-08-31 ASSESSMENT — PATIENT HEALTH QUESTIONNAIRE - PHQ9
SUM OF ALL RESPONSES TO PHQ QUESTIONS 1-9: 2
SUM OF ALL RESPONSES TO PHQ QUESTIONS 1-9: 2
10. IF YOU CHECKED OFF ANY PROBLEMS, HOW DIFFICULT HAVE THESE PROBLEMS MADE IT FOR YOU TO DO YOUR WORK, TAKE CARE OF THINGS AT HOME, OR GET ALONG WITH OTHER PEOPLE: NOT DIFFICULT AT ALL

## 2022-08-31 NOTE — PROGRESS NOTES
Writer received referral, reviewed for appropriate plan, and sent to New Patient Scheduling for completion.

## 2022-08-31 NOTE — PROGRESS NOTES
Assessment & Plan     Malignant neoplasm of right female breast, unspecified estrogen receptor status, unspecified site of breast (H)    Refer to cancer genetics specialists. If they cannot do the panel she is requesting, we can fax referral elsewhere.    - Cancer Risk Mgmt/Cancer Genetic Counseling Referral; Future      Screen for colon cancer    - RA[employer])                   No follow-ups on file.    Romel Pagan PA-C  [facility] HERO Rosas is a 56 year old presenting for the following health issues:  Refill Request (Genetic cancer screening)      History of Present Illness       Reason for visit:  Referral for genetic cancer screening    She eats 2-3 servings of fruits and vegetables daily.She consumes 0 sweetened beverage(s) daily.She exercises with enough effort to increase her heart rate 20 to 29 minutes per day.  She exercises with enough effort to increase her heart rate 5 days per week.   She is taking medications regularly.    Today's PHQ-9         PHQ-9 Total Score: 2    PHQ-9 Q9 Thoughts of better off dead/self-harm past 2 weeks :   Not at all    How difficult have these problems made it for you to do your work, take care of things at home, or get along with other people: Not difficult at all       Patient has a personal history of breast cancer. Has many family members with breast and pancreatic cancer. There are other types as well but a strong family history. Her daughters (both in their 30's) are seeing cancer specialists at [facility]. Their doctor's recommended that mother (patient) do genetic testing (47 gene panel) first and then daughter's would be tested if needed.      Review of Systems   Constitutional, HEENT, cardiovascular, pulmonary, gi and gu systems are negative, except as otherwise noted.        Objective    /58 (BP Location: Right arm, Patient Position: Sitting, Cuff Size: Adult Large)   Pulse 75   Temp 98.4   F (36.9  C) (Oral)   Resp 12   Wt 93.2 kg (205 lb 6.4 oz)   LMP 09/09/2010   SpO2 98%   BMI 34.18 kg/m    Body mass index is 34.18 kg/m .       Physical Exam   GENERAL: healthy, alert and no distress  EYES: Eyes grossly normal to inspection, PERRL and conjunctivae and sclerae normal  MS: no gross musculoskeletal defects noted, no edema  SKIN: no suspicious lesions or rashes  NEURO: Normal strength and tone, mentation intact and speech normal  PSYCH: mentation appears normal, affect normal/bright                    .  ..

## 2022-10-07 DIAGNOSIS — I10 ESSENTIAL HYPERTENSION WITH GOAL BLOOD PRESSURE LESS THAN 140/90: ICD-10-CM

## 2022-10-10 RX ORDER — LOSARTAN POTASSIUM AND HYDROCHLOROTHIAZIDE 25; 100 MG/1; MG/1
1 TABLET ORAL DAILY
Qty: 90 TABLET | Refills: 3 | OUTPATIENT
Start: 2022-10-10

## 2022-10-13 ENCOUNTER — VIRTUAL VISIT (OUTPATIENT)
Dept: FAMILY MEDICINE | Facility: CLINIC | Age: 57
End: 2022-10-13
Payer: COMMERCIAL

## 2022-10-13 VITALS — BODY MASS INDEX: 32.95 KG/M2 | HEIGHT: 66 IN | WEIGHT: 205 LBS

## 2022-10-13 DIAGNOSIS — J01.90 ACUTE SINUSITIS WITH SYMPTOMS > 10 DAYS: Primary | ICD-10-CM

## 2022-10-13 DIAGNOSIS — G43.909 MIGRAINE WITHOUT STATUS MIGRAINOSUS, NOT INTRACTABLE, UNSPECIFIED MIGRAINE TYPE: ICD-10-CM

## 2022-10-13 PROCEDURE — 99213 OFFICE O/P EST LOW 20 MIN: CPT | Mod: TEL | Performed by: FAMILY MEDICINE

## 2022-10-13 RX ORDER — AMOXICILLIN AND CLAVULANATE POTASSIUM 500; 125 MG/1; MG/1
1 TABLET, FILM COATED ORAL 3 TIMES DAILY
Qty: 30 TABLET | Refills: 0 | Status: SHIPPED | OUTPATIENT
Start: 2022-10-13 | End: 2022-11-02

## 2022-10-13 NOTE — PROGRESS NOTES
"Alison is a 57 year old who is being evaluated via a billable telephone visit.      What phone number would you like to be contacted at? [phone]  How would you like to obtain your AVS? [facility]    Assessment & Plan   Problem List Items Addressed This Visit     Acute sinusitis with symptoms > 10 days - Primary     As described.  Treat         Relevant Medications    amoxicillin-clavulanate (AUGMENTIN) 500-125 MG tablet    Migraine     Patient feels that her current headaches are different than her usual migraines.  I recommend she try her triptan for current symptoms                      BMI:   Estimated body mass index is 33.59 kg/m  as calculated from the following:    Height as of this encounter: 1.664 m (5' 5.5\").    Weight as of this encounter: 93 kg (205 lb).   Weight management plan: Patient was referred to their PCP to discuss a diet and exercise plan.        No follow-ups on file.   Follow-up Visit   Expected date:  Nov 02, 2022 (Approximate)      Follow Up Appointment Details:     Follow-up with whom?: PCP    Follow-Up for what?: Chronic Disease f/u    Chronic Disease f/u: General (Other)    How?: In Person or Virtual    Is this an as-needed follow-up?: No                    Elmer Manuel MD  [facility]   Alison is a 57 year old, presenting for the following health issues:  Sinus Problem      HPI     Acute Illness  Acute illness concerns: sinus infection  Onset/Duration: x2 weeks  Symptoms:  Fever: No  Chills/Sweats: No  Headache (location?): YES  Sinus Pressure: YES  Conjunctivitis:  YES  Ear Pain: YES: bilateral - pressure  Rhinorrhea: YES  Congestion: YES  Sore Throat: YES  Cough: YES-productive of yellow sputum, productive of green sputum  Wheeze: No  Decreased Appetite: YES  Nausea: YES  Vomiting: No  Diarrhea: No  Dysuria/Freq.: No  Dysuria or Hematuria: No  Fatigue/Achiness: YES  Sick/Strep Exposure: YES  Therapies tried and outcome: None      Review of " Systems   As above      Objective           Vitals:  No vitals were obtained today due to virtual visit.    Physical Exam   healthy, alert and no distress  PSYCH: Alert and oriented times 3; coherent speech, normal   rate and volume, able to articulate logical thoughts, able   to abstract reason, no tangential thoughts, no hallucinations   or delusions  Her affect is normal  RESP: No cough, no audible wheezing, able to talk in full sentences  Remainder of exam unable to be completed due to telephone visits        Elmer Manuel MD          Phone call duration: 7* minutes

## 2022-10-14 PROBLEM — J01.90 ACUTE SINUSITIS WITH SYMPTOMS > 10 DAYS: Status: ACTIVE | Noted: 2022-10-14

## 2022-10-14 NOTE — ASSESSMENT & PLAN NOTE
Patient feels that her current headaches are different than her usual migraines.  I recommend she try her triptan for current symptoms

## 2022-10-16 ENCOUNTER — HEALTH MAINTENANCE LETTER (OUTPATIENT)
Age: 57
End: 2022-10-16

## 2022-11-02 ENCOUNTER — OFFICE VISIT (OUTPATIENT)
Dept: FAMILY MEDICINE | Facility: CLINIC | Age: 57
End: 2022-11-02
Payer: COMMERCIAL

## 2022-11-02 VITALS
OXYGEN SATURATION: 96 % | HEART RATE: 81 BPM | DIASTOLIC BLOOD PRESSURE: 68 MMHG | RESPIRATION RATE: 14 BRPM | WEIGHT: 209.8 LBS | HEIGHT: 66 IN | SYSTOLIC BLOOD PRESSURE: 114 MMHG | BODY MASS INDEX: 33.72 KG/M2

## 2022-11-02 DIAGNOSIS — E66.01 MORBID OBESITY (H): ICD-10-CM

## 2022-11-02 DIAGNOSIS — R53.83 OTHER FATIGUE: ICD-10-CM

## 2022-11-02 DIAGNOSIS — I10 ESSENTIAL HYPERTENSION WITH GOAL BLOOD PRESSURE LESS THAN 140/90: ICD-10-CM

## 2022-11-02 DIAGNOSIS — E78.2 MIXED HYPERLIPIDEMIA: ICD-10-CM

## 2022-11-02 DIAGNOSIS — E55.9 VITAMIN D DEFICIENCY: ICD-10-CM

## 2022-11-02 DIAGNOSIS — G43.009 MIGRAINE WITHOUT AURA AND WITHOUT STATUS MIGRAINOSUS, NOT INTRACTABLE: ICD-10-CM

## 2022-11-02 DIAGNOSIS — I25.10 CORONARY ARTERY DISEASE INVOLVING NATIVE CORONARY ARTERY OF NATIVE HEART WITHOUT ANGINA PECTORIS: ICD-10-CM

## 2022-11-02 DIAGNOSIS — E04.1 THYROID NODULE: ICD-10-CM

## 2022-11-02 DIAGNOSIS — F32.0 MILD MAJOR DEPRESSION (H): ICD-10-CM

## 2022-11-02 DIAGNOSIS — Z12.11 SCREEN FOR COLON CANCER: ICD-10-CM

## 2022-11-02 DIAGNOSIS — Z00.00 ROUTINE HISTORY AND PHYSICAL EXAMINATION OF ADULT: Primary | ICD-10-CM

## 2022-11-02 LAB
ERYTHROCYTE [SEDIMENTATION RATE] IN BLOOD BY WESTERGREN METHOD: 17 MM/HR (ref 0–30)
HBA1C MFR BLD: 5.9 % (ref 0–5.6)

## 2022-11-02 PROCEDURE — 99214 OFFICE O/P EST MOD 30 MIN: CPT | Mod: 25 | Performed by: PHYSICIAN ASSISTANT

## 2022-11-02 PROCEDURE — 84443 ASSAY THYROID STIM HORMONE: CPT | Performed by: PHYSICIAN ASSISTANT

## 2022-11-02 PROCEDURE — 90471 IMMUNIZATION ADMIN: CPT | Performed by: PHYSICIAN ASSISTANT

## 2022-11-02 PROCEDURE — 36415 COLL VENOUS BLD VENIPUNCTURE: CPT | Performed by: PHYSICIAN ASSISTANT

## 2022-11-02 PROCEDURE — 84460 ALANINE AMINO (ALT) (SGPT): CPT | Performed by: PHYSICIAN ASSISTANT

## 2022-11-02 PROCEDURE — 83036 HEMOGLOBIN GLYCOSYLATED A1C: CPT | Performed by: PHYSICIAN ASSISTANT

## 2022-11-02 PROCEDURE — 80048 BASIC METABOLIC PNL TOTAL CA: CPT | Performed by: PHYSICIAN ASSISTANT

## 2022-11-02 PROCEDURE — 90682 RIV4 VACC RECOMBINANT DNA IM: CPT | Performed by: PHYSICIAN ASSISTANT

## 2022-11-02 PROCEDURE — 85652 RBC SED RATE AUTOMATED: CPT | Performed by: PHYSICIAN ASSISTANT

## 2022-11-02 PROCEDURE — 86140 C-REACTIVE PROTEIN: CPT | Performed by: PHYSICIAN ASSISTANT

## 2022-11-02 PROCEDURE — 82306 VITAMIN D 25 HYDROXY: CPT | Performed by: PHYSICIAN ASSISTANT

## 2022-11-02 PROCEDURE — 80061 LIPID PANEL: CPT | Performed by: PHYSICIAN ASSISTANT

## 2022-11-02 PROCEDURE — 99396 PREV VISIT EST AGE 40-64: CPT | Mod: 25 | Performed by: PHYSICIAN ASSISTANT

## 2022-11-02 RX ORDER — SUMATRIPTAN 100 MG/1
TABLET, FILM COATED ORAL
Qty: 6 TABLET | Refills: 1 | Status: SHIPPED | OUTPATIENT
Start: 2022-11-02

## 2022-11-02 RX ORDER — ROSUVASTATIN CALCIUM 20 MG/1
20 TABLET, COATED ORAL DAILY
Qty: 90 TABLET | Refills: 3 | Status: CANCELLED | OUTPATIENT
Start: 2022-11-02

## 2022-11-02 RX ORDER — LOSARTAN POTASSIUM AND HYDROCHLOROTHIAZIDE 25; 100 MG/1; MG/1
1 TABLET ORAL DAILY
Qty: 90 TABLET | Refills: 3 | Status: SHIPPED | OUTPATIENT
Start: 2022-11-02 | End: 2023-11-06

## 2022-11-02 RX ORDER — METOPROLOL SUCCINATE 25 MG/1
25 TABLET, EXTENDED RELEASE ORAL DAILY
Qty: 90 TABLET | Refills: 3 | Status: SHIPPED | OUTPATIENT
Start: 2022-11-02 | End: 2023-05-31

## 2022-11-02 SDOH — ECONOMIC STABILITY: TRANSPORTATION INSECURITY
IN THE PAST 12 MONTHS, HAS LACK OF TRANSPORTATION KEPT YOU FROM MEETINGS, WORK, OR FROM GETTING THINGS NEEDED FOR DAILY LIVING?: NO

## 2022-11-02 SDOH — ECONOMIC STABILITY: FOOD INSECURITY: WITHIN THE PAST 12 MONTHS, THE FOOD YOU BOUGHT JUST DIDN'T LAST AND YOU DIDN'T HAVE MONEY TO GET MORE.: NEVER TRUE

## 2022-11-02 SDOH — ECONOMIC STABILITY: TRANSPORTATION INSECURITY
IN THE PAST 12 MONTHS, HAS THE LACK OF TRANSPORTATION KEPT YOU FROM MEDICAL APPOINTMENTS OR FROM GETTING MEDICATIONS?: NO

## 2022-11-02 SDOH — HEALTH STABILITY: PHYSICAL HEALTH: ON AVERAGE, HOW MANY MINUTES DO YOU ENGAGE IN EXERCISE AT THIS LEVEL?: 30 MIN

## 2022-11-02 SDOH — ECONOMIC STABILITY: FOOD INSECURITY: WITHIN THE PAST 12 MONTHS, YOU WORRIED THAT YOUR FOOD WOULD RUN OUT BEFORE YOU GOT MONEY TO BUY MORE.: SOMETIMES TRUE

## 2022-11-02 SDOH — ECONOMIC STABILITY: INCOME INSECURITY: HOW HARD IS IT FOR YOU TO PAY FOR THE VERY BASICS LIKE FOOD, HOUSING, MEDICAL CARE, AND HEATING?: SOMEWHAT HARD

## 2022-11-02 SDOH — ECONOMIC STABILITY: INCOME INSECURITY: IN THE LAST 12 MONTHS, WAS THERE A TIME WHEN YOU WERE NOT ABLE TO PAY THE MORTGAGE OR RENT ON TIME?: YES

## 2022-11-02 SDOH — HEALTH STABILITY: PHYSICAL HEALTH: ON AVERAGE, HOW MANY DAYS PER WEEK DO YOU ENGAGE IN MODERATE TO STRENUOUS EXERCISE (LIKE A BRISK WALK)?: 3 DAYS

## 2022-11-02 ASSESSMENT — ANXIETY QUESTIONNAIRES
3. WORRYING TOO MUCH ABOUT DIFFERENT THINGS: SEVERAL DAYS
7. FEELING AFRAID AS IF SOMETHING AWFUL MIGHT HAPPEN: SEVERAL DAYS
GAD7 TOTAL SCORE: 6
5. BEING SO RESTLESS THAT IT IS HARD TO SIT STILL: NOT AT ALL
6. BECOMING EASILY ANNOYED OR IRRITABLE: SEVERAL DAYS
IF YOU CHECKED OFF ANY PROBLEMS ON THIS QUESTIONNAIRE, HOW DIFFICULT HAVE THESE PROBLEMS MADE IT FOR YOU TO DO YOUR WORK, TAKE CARE OF THINGS AT HOME, OR GET ALONG WITH OTHER PEOPLE: SOMEWHAT DIFFICULT
7. FEELING AFRAID AS IF SOMETHING AWFUL MIGHT HAPPEN: SEVERAL DAYS
4. TROUBLE RELAXING: SEVERAL DAYS
8. IF YOU CHECKED OFF ANY PROBLEMS, HOW DIFFICULT HAVE THESE MADE IT FOR YOU TO DO YOUR WORK, TAKE CARE OF THINGS AT HOME, OR GET ALONG WITH OTHER PEOPLE?: SOMEWHAT DIFFICULT
2. NOT BEING ABLE TO STOP OR CONTROL WORRYING: SEVERAL DAYS
1. FEELING NERVOUS, ANXIOUS, OR ON EDGE: SEVERAL DAYS

## 2022-11-02 ASSESSMENT — LIFESTYLE VARIABLES
HOW MANY STANDARD DRINKS CONTAINING ALCOHOL DO YOU HAVE ON A TYPICAL DAY: PATIENT DOES NOT DRINK
SKIP TO QUESTIONS 9-10: 1
HOW OFTEN DO YOU HAVE SIX OR MORE DRINKS ON ONE OCCASION: NEVER
AUDIT-C TOTAL SCORE: 0
HOW OFTEN DO YOU HAVE A DRINK CONTAINING ALCOHOL: NEVER

## 2022-11-02 ASSESSMENT — SOCIAL DETERMINANTS OF HEALTH (SDOH)
HOW OFTEN DO YOU GET TOGETHER WITH FRIENDS OR RELATIVES?: TWICE A WEEK
HOW OFTEN DO YOU ATTEND CHURCH OR RELIGIOUS SERVICES?: 1 TO 4 TIMES PER YEAR
IN A TYPICAL WEEK, HOW MANY TIMES DO YOU TALK ON THE PHONE WITH FAMILY, FRIENDS, OR NEIGHBORS?: MORE THAN THREE TIMES A WEEK
DO YOU BELONG TO ANY CLUBS OR ORGANIZATIONS SUCH AS CHURCH GROUPS UNIONS, FRATERNAL OR ATHLETIC GROUPS, OR SCHOOL GROUPS?: NO

## 2022-11-02 ASSESSMENT — ENCOUNTER SYMPTOMS
EYE PAIN: 0
HEMATURIA: 0
FEVER: 0
ARTHRALGIAS: 1
CONSTIPATION: 0
SHORTNESS OF BREATH: 0
DIARRHEA: 0
WEAKNESS: 0
MYALGIAS: 1
JOINT SWELLING: 0
PALPITATIONS: 0
NERVOUS/ANXIOUS: 1
HEARTBURN: 0
DIZZINESS: 0
SORE THROAT: 0
HEMATOCHEZIA: 0
DYSURIA: 0
PARESTHESIAS: 0
NAUSEA: 0
FREQUENCY: 0
CHILLS: 0
COUGH: 1
ABDOMINAL PAIN: 0
HEADACHES: 1

## 2022-11-02 ASSESSMENT — PATIENT HEALTH QUESTIONNAIRE - PHQ9
10. IF YOU CHECKED OFF ANY PROBLEMS, HOW DIFFICULT HAVE THESE PROBLEMS MADE IT FOR YOU TO DO YOUR WORK, TAKE CARE OF THINGS AT HOME, OR GET ALONG WITH OTHER PEOPLE: SOMEWHAT DIFFICULT
SUM OF ALL RESPONSES TO PHQ QUESTIONS 1-9: 7
SUM OF ALL RESPONSES TO PHQ QUESTIONS 1-9: 7

## 2022-11-02 NOTE — PROGRESS NOTES
"   SUBJECTIVE:   CC: Alison is an 57 year old who presents for preventive health visit.       PATIENT HEALTH QUESTIONNAIRE-9 (PHQ - 9)    Over the last 2 weeks, how often have you been bothered by any of the following problems?    1. Little interest or pleasure in doing things -  Several days   2. Feeling down, depressed, or hopeless -  Several days   3. Trouble falling or staying asleep, or sleeping too much - Several days   4. Feeling tired or having little energy -  Several days   5. Poor appetite or overeating -  Several days   6. Feeling bad about yourself - or that you are a failure or have let yourself or your family down -  Several days   7. Trouble concentrating on things, such as reading the newspaper or watching television - Several days   8. Moving or speaking so slowly that other people could have noticed? Or the opposite - being so fidgety or restless that you have been moving around a lot more than usual Not at all   9. Thoughts that you would be better off dead or of hurting  yourself in some way Not at all   Total Score: 7       Patient has been advised of split billing requirements and indicates understanding: Yes     Healthy Habits:     Getting at least 3 servings of Calcium per day:  Yes    Bi-annual eye exam:  Yes    Dental care twice a year:  NO    Sleep apnea or symptoms of sleep apnea:  Daytime drowsiness    Diet:  Regular (no restrictions)    Frequency of exercise:  2-3 days/week    Duration of exercise:  15-30 minutes    Taking medications regularly:  Yes    Medication side effects:  None    PHQ-2 Total Score: 2    Additional concerns today:  Yes          Feels like she is \"in a fog\" after having covid. difficulty with concentration, low energy, fatigue, apathy.   Patient does have h/o mild depression, but does not \"feel\" depressed.        Today's PHQ-2 Score:   PHQ-2 ( 1999 Pfizer) 11/2/2022   Q1: Little interest or pleasure in doing things 1   Q2: Feeling down, depressed or hopeless 1 "   PHQ-2 Score 2   PHQ-2 Total Score (12-17 Years)- Positive if 3 or more points; Administer PHQ-A if positive -   Q1: Little interest or pleasure in doing things Several days   Q2: Feeling down, depressed or hopeless Several days   PHQ-2 Score 2       Abuse: Current or Past (Physical, Sexual or Emotional) - No  Do you feel safe in your environment? Yes        Social History     Tobacco Use     Smoking status: Former     Types: Cigarettes     Quit date:      Years since quittin.8     Smokeless tobacco: Never     Tobacco comments:     Quit in    Substance Use Topics     Alcohol use: No     Comment: none         Alcohol Use 2022   Prescreen: >3 drinks/day or >7 drinks/week? No   Prescreen: >3 drinks/day or >7 drinks/week? -       Reviewed orders with patient.  Reviewed health maintenance and updated orders accordingly - Yes  Lab work is in process    Breast Cancer Screening:    FHS-7:   Breast CA Risk Assessment (FHS-7) 2021   Did any of your first-degree relatives have breast or ovarian cancer? Yes   Did any of your relatives have bilateral breast cancer? Unknown   Did any man in your family have breast cancer? Yes   Did any woman in your family have breast and ovarian cancer? Yes   Did any woman in your family have breast cancer before age 50 y? Yes   Do you have 2 or more relatives with breast and/or ovarian cancer? Yes   Do you have 2 or more relatives with breast and/or bowel cancer? Yes       Mammogram Screening - Offered annual screening and updated Health Maintenance for mutual plan based on risk factor consideration    Pertinent mammograms are reviewed under the imaging tab.    History of abnormal Pap smear: NO - age 30-65 PAP every 5 years with negative HPV co-testing recommended  PAP / HPV 2010 3/24/2009   PAP (Historical) OTHER-NIL EM>40 OTHER-NIL EM>40     Reviewed and updated as needed this visit by clinical staff   Tobacco  Allergies    Med Hx  Surg Hx  Fam Hx       Francine  "Sonal on 11/2/2022 at 3:05 PM      Reviewed and updated as needed this visit by Provider                 Past Medical History:   Diagnosis Date     Allergic rhinitis, cause unspecified      Anxiety     pt requests relaxant prior to surgery     BENIGN HYPERTENSION 11/2/2004     Calculus of kidney      Elevated rheumatoid factor 12/2/2011     HTN, goal below 140/90 6/13/2013     Infectious mononucleosis      Malignant neoplasm (H)      Migraine 2/20/2012        Review of Systems   Constitutional: Negative for chills and fever.   HENT: Positive for congestion. Negative for ear pain, hearing loss and sore throat.    Eyes: Negative for pain and visual disturbance.   Respiratory: Positive for cough. Negative for shortness of breath.    Cardiovascular: Negative for chest pain, palpitations and peripheral edema.   Gastrointestinal: Negative for abdominal pain, constipation, diarrhea, heartburn, hematochezia and nausea.   Genitourinary: Negative for dysuria, frequency, genital sores, hematuria and urgency.   Musculoskeletal: Positive for arthralgias and myalgias. Negative for joint swelling.   Skin: Negative for rash.   Neurological: Positive for headaches. Negative for dizziness, weakness and paresthesias.   Psychiatric/Behavioral: Positive for mood changes. The patient is nervous/anxious.           OBJECTIVE:   /68 (BP Location: Right arm, Patient Position: Sitting, Cuff Size: Adult Large)   Pulse 81   Resp 14   Ht 1.683 m (5' 6.25\")   Wt 95.2 kg (209 lb 12.8 oz)   LMP 09/09/2010   SpO2 96%   BMI 33.61 kg/m    Physical Exam  GENERAL APPEARANCE: healthy, alert and no distress  EYES: Eyes grossly normal to inspection, PERRL and conjunctivae and sclerae normal  HENT: ear canals and TM's normal, nose and mouth without ulcers or lesions, oropharynx clear and oral mucous membranes moist  NECK: no adenopathy, no asymmetry, masses, or scars and thyroid normal to palpation  RESP: lungs clear to auscultation - no rales, " rhonchi or wheezes  CV: regular rate and rhythm, normal S1 S2, no S3 or S4, no murmur, click or rub, no peripheral edema and peripheral pulses strong  ABDOMEN: soft, nontender, no hepatosplenomegaly, no masses and bowel sounds normal  MS: no musculoskeletal defects are noted and gait is age appropriate without ataxia  SKIN: no suspicious lesions or rashes  NEURO: Normal strength and tone, sensory exam grossly normal, mentation intact and speech normal  PSYCH: mentation appears normal and affect normal/bright        ASSESSMENT/PLAN:   (Z00.00) Routine history and physical examination of adult  (primary encounter diagnosis)  Comment:   Plan: CRP, inflammation, ESR: Erythrocyte         sedimentation rate, Hemoglobin A1c, Basic         metabolic panel  (Ca, Cl, CO2, Creat, Gluc, K,         Na, BUN)            (E66.01) Morbid obesity (H)  Comment:   Plan: diet and exercise.    (F32.0) Mild major depression (H)  Comment:   Plan: await labs. Symptoms c/w dysthymia     (I25.10) Coronary artery disease involving native coronary artery of native heart without angina pectoris  Comment:   Plan: follow-up with cardiology     (I10) Essential hypertension with goal blood pressure less than 140/90  Comment: controlled   Plan: losartan-hydrochlorothiazide (HYZAAR) 100-25 MG        tablet, metoprolol succinate ER (TOPROL XL) 25         MG 24 hr tablet            (R53.83) Other fatigue  Comment:   Plan: TSH with free T4 reflex        Await labs.     (G43.009) Migraine without aura and without status migrainosus, not intractable  Comment:   Plan: SUMAtriptan (IMITREX) 100 MG tablet            (E78.2) Mixed hyperlipidemia  Comment:   Plan: await labs.     (E55.9) Vitamin D deficiency  Comment: taking   Plan: Vitamin D Deficiency            (E04.1) Thyroid nodule  Comment: noted on CT. Needs follow-up   Plan: US Thyroid            (Z12.11) Screen for colon cancer  Comment:   Plan: has cologuard at home. Needs to mail in    Patient has  "been advised of split billing requirements and indicates understanding: Yes      COUNSELING:  Reviewed preventive health counseling, as reflected in patient instructions       Regular exercise       Healthy diet/nutrition       Vision screening       Immunizations    Vaccinated for: Influenza             Colorectal Cancer Screening    Estimated body mass index is 33.61 kg/m  as calculated from the following:    Height as of this encounter: 1.683 m (5' 6.25\").    Weight as of this encounter: 95.2 kg (209 lb 12.8 oz).    Weight management plan: Discussed healthy diet and exercise guidelines    She reports that she quit smoking about 24 years ago. Her smoking use included cigarettes. She has never used smokeless tobacco.      Counseling Resources:  ATP IV Guidelines  Pooled Cohorts Equation Calculator  Breast Cancer Risk Calculator  BRCA-Related Cancer Risk Assessment: FHS-7 Tool  FRAX Risk Assessment  ICSI Preventive Guidelines  Dietary Guidelines for Americans, 2010  USDA's MyPlate  ASA Prophylaxis  Lung CA Screening    Susan Chaves PA-C  [facility]  "

## 2022-11-03 PROBLEM — R73.03 PRE-DIABETES: Status: ACTIVE | Noted: 2022-11-03

## 2022-11-03 LAB
ALT SERPL W P-5'-P-CCNC: 32 U/L (ref 0–50)
ANION GAP SERPL CALCULATED.3IONS-SCNC: 7 MMOL/L (ref 3–14)
BUN SERPL-MCNC: 16 MG/DL (ref 7–30)
CALCIUM SERPL-MCNC: 9.7 MG/DL (ref 8.5–10.1)
CHLORIDE BLD-SCNC: 101 MMOL/L (ref 94–109)
CHOLEST SERPL-MCNC: 250 MG/DL
CO2 SERPL-SCNC: 29 MMOL/L (ref 20–32)
CREAT SERPL-MCNC: 0.66 MG/DL (ref 0.52–1.04)
CRP SERPL-MCNC: 5.35 MG/L
DEPRECATED CALCIDIOL+CALCIFEROL SERPL-MC: 52 UG/L (ref 20–75)
FASTING STATUS PATIENT QL REPORTED: ABNORMAL
GFR SERPL CREATININE-BSD FRML MDRD: >90 ML/MIN/1.73M2
GLUCOSE BLD-MCNC: 105 MG/DL (ref 70–99)
HDLC SERPL-MCNC: 50 MG/DL
LDLC SERPL CALC-MCNC: 138 MG/DL
NONHDLC SERPL-MCNC: 200 MG/DL
POTASSIUM BLD-SCNC: 3.8 MMOL/L (ref 3.4–5.3)
SODIUM SERPL-SCNC: 137 MMOL/L (ref 133–144)
TRIGL SERPL-MCNC: 308 MG/DL
TSH SERPL DL<=0.005 MIU/L-ACNC: 1.22 MU/L (ref 0.4–4)

## 2022-11-04 ENCOUNTER — HOSPITAL ENCOUNTER (OUTPATIENT)
Dept: ULTRASOUND IMAGING | Facility: CLINIC | Age: 57
Discharge: HOME | End: 2022-11-04
Attending: PHYSICIAN ASSISTANT | Admitting: PHYSICIAN ASSISTANT
Payer: COMMERCIAL

## 2022-11-04 ENCOUNTER — TELEPHONE (OUTPATIENT)
Dept: CARDIOLOGY | Facility: CLINIC | Age: 57
End: 2022-11-04

## 2022-11-04 DIAGNOSIS — E04.1 THYROID NODULE: ICD-10-CM

## 2022-11-04 PROCEDURE — 76536 US EXAM OF HEAD AND NECK: CPT

## 2022-11-04 NOTE — TELEPHONE ENCOUNTER
"Reviewed lipids/alt showing   !LIPID/HEPATIC Latest Ref Rng & Units 11/2/2021 11/2/2022   CHOL <200 mg/dL 234 (H) 250 (H)   TRIGLYCERIDES <150 mg/dL 228 (H) 308 (H)   HDL >=50 mg/dL 43 (L) 50   LDL <=100 mg/dL 145 (H) 138 (H)   VLDL 0 - 30 mg/dL     NHDL <130 mg/dL 191 (H) 200 (H)   CHOLHDLRATIO 0.0 - 5.0     AST 0 - 45 U/L 40    ALT 0 - 50 U/L 55 (H) 32   CRP <5.00 mg/L 8.7 (H) 5.35 (H)     Per office note dated 6/03/22, Dr. Stein recommended, \"Finally, this patient should be on high-intensity statin therapy, as the patient clearly has coronary artery disease, and her LDL goal will be an LDL of less than 70.  I did sense a significant amount of reticence on the patient about using statins, and she has obviously read a lot of the fake information that is out there on statins.  I did spend a long time explaining to her the benefits of statin therapy, the side effects of statin therapy including myalgias, muscle stiffness and cramping, as well as the liver dysfunction that can occur with this drug as well.  So we will start her on rosuvastatin 20 mg per day and recheck her lipids in 6 weeks' time with an ALT.  We will have the patient follow up with one of our APPs in approximately 6 weeks' time with the results of all of our investigations.  Obviously, if the stress test is abnormal, we may see her sooner than that or talk to her by video visit sooner than that.\"    Attempted to call pt, left message for pt to call back. Everton RN   "

## 2022-11-08 DIAGNOSIS — E04.1 THYROID NODULE: Primary | ICD-10-CM

## 2022-11-14 NOTE — PROGRESS NOTES
Alison is a 57 year old who is being evaluated via a billable video visit.    How would you like to obtain your AVS? MyChart  If the video visit is dropped, the invitation should be resent by: Text to cell phone: [phone]  Will anyone else be joining your video visit? Savannah Acosta VF    Video-Visit Details  Video Start Time: 2:15 PM  Type of service:  Video Visit  Video End Time:3:08 PM  Originating Location (pt. Location): Home  Distant Location (provider location):  Off-site  Platform used for Video Visit: Latio    11/15/2022    Referring Provider: Romel Pagan PA-C    Presenting Information:   I met with Alison Barfield today for her video genetic counseling visit through the [facility] to discuss her personal history of breast cancer and family history of female and male breast cancer, pancreatic cancer, ovarian cancer, and colon cancer. She is here today to review this history, cancer screening recommendations, and available genetic testing options.    Personal History:  Alison is a 57 year old female. She was diagnosed with breast cancer at age 44.  Treatment included a bilateral mastectomy. 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 Alison reportedly tested negative for the BRCA1 and BRCA2 genes in 2010, however, her report was unavailable for review today.     In her hormonal-based medical history, she had her first menstrual period at age 12, and her first child at age 25. Alison underwent a total abdominal hysterectomy and bilateral salpingo-oophorectomy in 2010. She reports no history of hormone replacement therapy. Alison reports oral contraceptive use for approximately 25 years. She has not had a colonoscopy. Alison denies any history of dermatological exams. She does not regularly do any other cancer screening at this time. Alison reported a history of smoking for approximately 20 years and no current alcohol use.    Family History: (Please see scanned  pedigree for detailed family history information)    Alison's brother was diagnosed with squamous cell carcinoma in his jaw at age 49. He was later diagnosed with pancreatic cancer at age 64. He passed away at age 66.     Alison's mother was diagnosed with breast cancer at age 83 and passed away shortly after.     Maternal fist cousin was diagnosed with pancreatic cancer at age 54 and passed away shortly after.     Maternal grandfather was diagnosed with colon cancer at age 80 and passed away shortly after.     Alison's father was diagnosed with prostate cancer at age 64. He was later diagnosed with lung cancer/mesothelioma at age 72 and passed away shortly after.     Paternal uncle was diagnosed with breast cancer at age 60, melanoma at age 65, and an unknown cancer at age 88. He passed away at age 90.     His son (Dayans first cousin) was diagnosed with breast cancer at age 53. He is currently 57.     One daughter (Dayans first cousin) was diagnosed with melanoma at age 44 and breast cancer at age 46. She passed away at age 48.     Two other daughters (Dayans first cousins) were diagnosed with breast cancer at ages 47 and 55. One is currently 59 while the other is currently 71.     Another paternal first cousin (Alison's aunt's son) was diagnosed with breast cancer at age 50. He is currently 67.     Another paternal first cousin (Alison's aunt's daughter) was diagnosed with breast cancer at age 48. She is currently 65.     Paternal grandmother was diagnosed with breast cancer at age 56 and again at age 59. She was later diagnosed with ovarian cancer at age 64. She passed away at age 66.     Three paternal great aunts (Dayans grandfather's sisters) were diagnosed with breast cancer at unknown ages.     Her maternal and paternal ethnicity is Mong[place]ian and English.  There is no known Ashkenazi Episc[employer]alian ancestry on either side of her family. There is no reported consanguinity.    Discussion:  Alison's personal history of  breast cancer and family history of female and male breast cancer, pancreatic cancer, and ovarian cancer is suggestive of a hereditary cancer syndrome.  We reviewed the features of sporadic, familial, and hereditary cancers. We discussed the natural history and genetics of several hereditary cancer syndromes, including Hereditary Breast and Ovarian Cancer Syndrome (HBOC).   The most common cause of hereditary breast and ovarian cancer is HBOC, which is caused by mutations in the BRCA1 and BRCA2 genes. Individuals with HBOC syndrome are at increased risk for several different cancers, including breast, ovarian, male breast, prostate, melanoma, and pancreatic cancer. HBOC typically presents with multiple family members diagnosed with breast cancer before age 50 and/or ovarian cancer.   A detailed handout regarding information about HBOC and related hereditary cancer syndromes will be provided to Alison via [employer] and can be found in the after visit summary. Topics included: inheritance pattern, cancer risks, cancer screening recommendations, and also risks, benefits and limitations of testing.  We discussed Alison's reported past genetic testing of BRCA1 and BRCA2 genes that is presumed to be negative.   Alison did not have a copy of her genetic testing report for us to review today, although she did report having testing in 2010 at the time of her diagnosis. I explained that this testing was very limited and is now outdated. Not only do we know about/test for additional genes via panel testing, her testing of BRCA1 and BRCA2 may have been incomplete. Newer testing technology may be able to identify mutations in BRCA1 and BRCA2 that were missed by older testing technology.   NCCN guidelines also mention that there may be a role for multi-gene panel genetic testing for patients who have previously tested negative for a single hereditary cancer syndrome, but have a suspicious personal or family history.  In looking at  Alison's personal and family history, it is possible that a cancer susceptibility gene is present due to her personal diagnosis of breast cancer at age 44 and previously limited genetic testing of genes associated with breast cancer, paternal uncle diagnosed with breast cancer at age 60, two paternal male first cousins diagnosed with breast cancer, four paternal first female cousins diagnosed with breast cancer, paternal grandmother diagnosed with breast cancer at ages 56 and 59, and her father diagnosed with prostate cancer.  Based on her personal and family history, Alison meets current National Comprehensive Cancer Network (NCCN) criteria for genetic testing of high-penetrance breast cancer susceptibility genes (including BRCA1, BRCA2, CDH1, PALB2, PTEN, and TP53).  In looking at Alison's personal and family history, it is possible that a cancer susceptibility gene is present due to her paternal grandmother diagnosed with ovarian cancer at age 64.  Based on her personal and family history, Alison meets current National Comprehensive Cancer Network (NCCN) criteria for genetic testing of high-penetrance ovarian cancer susceptibility genes (including BRCA1, BRCA2, BRIP1, MLH1, MSH2, MSH6, PMS2, EPCAM, PALB2, RAD51C, and RAD51D).  In looking at Alison's personal and family history, it is possible that a cancer susceptibility gene is present due to her brother diagnosed with pancreatic cancer at age 64.    Based on her personal and family history, Alison meets current National Comprehensive Cancer Network (NCCN) criteria for genetic testing of pancreatic cancer susceptibility genes (including EDILBERTO, BRCA1, BRCA2, CDKN2A, EPCAM, MLH1, MSH2, MSH6, PALB2, STK11, and TP53).  We discussed that there are additional genes that could cause increased risk for breast, ovarian, and pancreatic cancer. As many of these genes present with overlapping features in a family and accurate cancer risk cannot always be established based upon the  pedigree analysis alone, it would be reasonable for Alison to consider panel genetic testing to analyze multiple genes at once.  We reviewed genetic testing options for Alison based on her personal and family history: a panel of genes associated with an increased risk for hereditary breast, gynecologic, and pancreatic cancers, or larger panel options to include genes associated with increased risk for multiple different cancer types. Alison expressed interest in the BRCA1 and BRCA2 genes with automatic reflex to the Multi-Cancer Panel through [facility].   The [employer] Multi-Cancer Panel analyzes 84 genes associated with an increased risk for cancer: AIP, ALK, APC, EDILBERTO, AXIN2, BAP1, BARD1, BLM, BMPR1A, BRCA1, BRCA2, BRIP1, CASR,CDC73, CDH1, CDK4, CDKN1B, CDKN1C, CDKN2A, CEBPA, CHEK2, CTNNA1, DICER1,DIS3L2, EGFR, EPCAM, FH, FLCN, GATA2, GPC3, GREM1, HOXB13, HRAS, KIT, MAX,MEN1, MET, MITF, MLH1, MSH2, MSH3, MSH6, MUTYH, NBN, NF1, NF2, NTHL1,PALB2, PDGFRA, PHOX2B, PMS2, POLD1, POLE, POT1, DSHDX8K, PTCH1, PTEN, RAD50,RAD51C, RAD51D, RB1, RECQL4, RET, RUNX1, SDHA, SDHAF2, SDHB, SDHC, SDHD,SMAD4, SMARCA4, SMARCB1, SMARCE1, STK11, SUFU, TERC, TERT, WMTZ285, TP53,TSC1, TSC2, VHL, WRN, WT1.  This panel includes genes associated with hereditary cancers across multiple major organ systems, including:  breast and gynecologic (breast, ovarian, uterine)  gastrointestinal (colorectal, gastric, pancreatic)  endocrine (thyroid, paraganglioma/pheochromocytoma, parathyroid, pituitary)  genitourinary (renal/urinary tract, prostate)  skin (melanoma, basal cell carcinoma)  brain/nervous system   Sarcoma  hematologic (myelodysplastic syndrome/leukemia)  Individuals who are found to carry a pathogenic variant in one of these genes have an increased risk of developing certain cancers/tumors.  Identifying those at elevated risk may guide implementation of additional screening, surveillance and interventions.    Alison would like to submit  a blood sample for her genetic testing. She will go to her nearest [facility] at her earliest convenience to get her blood drawn for her genetic testing.   Verbal consent was given over video and written on the consent form. Turnaround time is approximately 3-4 weeks once the lab receives the sample.    Medical Management: For Alison, we reviewed that the information from genetic testing may determine:    additional cancer screening for which Alison may qualify (i.e. more frequent colonoscopies, more frequent dermatologic exams, etc.),      and targeted chemotherapies if she were to develop certain cancers in the future (i.e. immunotherapy for individuals with Morris syndrome, PARP inhibitors, etc.).     These recommendations and possible targeted chemotherapies will be discussed in detail once genetic testing is completed.     Plan:  1) Today Alison elected to proceed with the BRCA1 and BRCA2 genes with automatic reflex to the Multi-Cancer Panel through [facility]. Therefore, consent was reviewed and verbally obtained for this testing.   2) Alison plans to schedule her blood draw appointment at a clinic that is convenient to her.   3) The results should be available in 3-4 weeks after her blood is drawn.  4) Alison will either have a telephone visit or video visit to discuss the results.  Additional recommendations about screening will be made at that time.    Alison is 57 year old and is being evaluated via a billable video visit.    Time spent on video: 52 minutes    Laurie Baker MS, [facility] [place]  Licensed, Certified Genetic Counselor  Phone: [phone]

## 2022-11-15 ENCOUNTER — TELEPHONE (OUTPATIENT)
Dept: ONCOLOGY | Facility: CLINIC | Age: 57
End: 2022-11-15

## 2022-11-15 ENCOUNTER — VIRTUAL VISIT (OUTPATIENT)
Dept: ONCOLOGY | Facility: CLINIC | Age: 57
End: 2022-11-15
Attending: PHYSICIAN ASSISTANT
Payer: COMMERCIAL

## 2022-11-15 DIAGNOSIS — C50.911 MALIGNANT NEOPLASM OF RIGHT FEMALE BREAST, UNSPECIFIED ESTROGEN RECEPTOR STATUS, UNSPECIFIED SITE OF BREAST (H): Primary | ICD-10-CM

## 2022-11-15 DIAGNOSIS — Z80.0 FAMILY HISTORY OF PANCREATIC CANCER: ICD-10-CM

## 2022-11-15 DIAGNOSIS — Z80.0 FAMILY HISTORY OF COLON CANCER: ICD-10-CM

## 2022-11-15 DIAGNOSIS — Z80.8 FAMILY HISTORY OF MELANOMA: ICD-10-CM

## 2022-11-15 DIAGNOSIS — Z80.1 FAMILY HISTORY OF LUNG CANCER: ICD-10-CM

## 2022-11-15 DIAGNOSIS — Z80.41 FAMILY HISTORY OF MALIGNANT NEOPLASM OF OVARY: ICD-10-CM

## 2022-11-15 DIAGNOSIS — Z80.42 FAMILY HISTORY OF PROSTATE CANCER: ICD-10-CM

## 2022-11-15 DIAGNOSIS — Z80.3 FAMILY HISTORY OF MALIGNANT NEOPLASM OF BREAST: ICD-10-CM

## 2022-11-15 DIAGNOSIS — Z80.8 FAMILY HISTORY OF NONMELANOMA SKIN CANCER: ICD-10-CM

## 2022-11-15 PROCEDURE — 96040 HC GENETIC COUNSELING, EACH 30 MINUTES: CPT | Mod: GT,95

## 2022-11-15 NOTE — TELEPHONE ENCOUNTER
Attempted to call pt with lipids, left message for pt to call back. Will message pt is a results note. Everton AREVALO

## 2022-11-15 NOTE — LETTER
Cancer Risk Management  Program Locations    [facility]  [facility]  [facility]  [facility]  [facility]  Mailing Address  Cancer Risk Management Program  [address]  [place] [address]    New patient appointments  [phone]  November 15, 2022    Alison Barfield  [address] [facility] [id number]    Dear Alison,    It was a pleasure talking with you via your virtual genetic counseling visit on 11/15/2022.  Below is a copy of the progress note from that recent visit through the Cancer Risk Management Program.  If you have any additional questions, please feel free to contact me.    Referring Provider: Romel Pagan PA-C    Presenting Information:   I met with Alison Barfield today for her video genetic counseling visit through the Cancer Risk Management Program to discuss her personal history of breast cancer and family history of female and male breast cancer, pancreatic cancer, ovarian cancer, and colon cancer. She is here today to review this history, cancer screening recommendations, and available genetic testing options.    Personal History:  Alison is a 57 year old female. She was diagnosed with breast cancer at age 44.  Treatment included a bilateral mastectomy. 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 Alison reportedly tested negative for the BRCA1 and BRCA2 genes in 2010, however, her report was unavailable for review today.     In her hormonal-based medical history, she had her first menstrual period at age 12, and her first child at age 25. Alison underwent a total abdominal hysterectomy and bilateral salpingo-oophorectomy in 2010. She reports no history of hormone replacement therapy. Alison reports oral contraceptive use for approximately 25 years. She has not had a colonoscopy. Alison denies any history of  dermatological exams. She does not regularly do any other cancer screening at this time. Alison reported a history of smoking for approximately 20 years and no current alcohol use.      Family History: (Please see scanned pedigree for detailed family history information)    Alison's brother was diagnosed with squamous cell carcinoma in his jaw at age 49. He was later diagnosed with pancreatic cancer at age 64. He passed away at age 66.     Alison's mother was diagnosed with breast cancer at age 83 and passed away shortly after.     Maternal fist cousin was diagnosed with pancreatic cancer at age 54 and passed away shortly after.     Maternal grandfather was diagnosed with colon cancer at age 80 and passed away shortly after.     Alison's father was diagnosed with prostate cancer at age 64. He was later diagnosed with lung cancer/mesothelioma at age 72 and passed away shortly after.     Paternal uncle was diagnosed with breast cancer at age 60, melanoma at age 65, and an unknown cancer at age 88. He passed away at age 90.     His son (Alison's first cousin) was diagnosed with breast cancer at age 53. He is currently 57.     One daughter (Dayans first cousin) was diagnosed with melanoma at age 44 and breast cancer at age 46. She passed away at age 48.     Two other daughters (Dayans first cousins) were diagnosed with breast cancer at ages 47 and 55. One is currently 59 while the other is currently 71.     Another paternal first cousin (Alison's aunt's son) was diagnosed with breast cancer at age 50. He is currently 67.     Another paternal first cousin (Alison's aunt's daughter) was diagnosed with breast cancer at age 48. She is currently 65.     Paternal grandmother was diagnosed with breast cancer at age 56 and again at age 59. She was later diagnosed with ovarian cancer at age 64. She passed away at age 66.     Three paternal great aunts (Alison's grandfather's sisters) were diagnosed with breast cancer at unknown ages.      Her maternal and paternal ethnicity is English and English.  There is no known Ashkenazi Anglican ancestry on either side of her family. There is no reported consanguinity.    Discussion:  Alison's personal history of breast cancer and family history of female and male breast cancer, pancreatic cancer, and ovarian cancer is suggestive of a hereditary cancer syndrome.  We reviewed the features of sporadic, familial, and hereditary cancers. We discussed the natural history and genetics of several hereditary cancer syndromes, including Hereditary Breast and Ovarian Cancer Syndrome (HBOC).   The most common cause of hereditary breast and ovarian cancer is HBOC, which is caused by mutations in the BRCA1 and BRCA2 genes. Individuals with HBOC syndrome are at increased risk for several different cancers, including breast, ovarian, male breast, prostate, melanoma, and pancreatic cancer. HBOC typically presents with multiple family members diagnosed with breast cancer before age 50 and/or ovarian cancer.   A detailed handout regarding information about HBOC and related hereditary cancer syndromes will be provided to Alison via [employer] and can be found in the after visit summary. Topics included: inheritance pattern, cancer risks, cancer screening recommendations, and also risks, benefits and limitations of testing.  We discussed Alison's reported past genetic testing of BRCA1 and BRCA2 genes that is presumed to be negative.   Alison did not have a copy of her genetic testing report for us to review today, although she did report having testing in 2010 at the time of her diagnosis. I explained that this testing was very limited and is now outdated. Not only do we know about/test for additional genes via panel testing, her testing of BRCA1 and BRCA2 may have been incomplete. Newer testing technology may be able to identify mutations in BRCA1 and BRCA2 that were missed by older testing technology.   NCCN guidelines also mention  that there may be a role for multi-gene panel genetic testing for patients who have previously tested negative for a single hereditary cancer syndrome, but have a suspicious personal or family history.  In looking at Alison's personal and family history, it is possible that a cancer susceptibility gene is present due to her personal diagnosis of breast cancer at age 44 and previously limited genetic testing of genes associated with breast cancer, paternal uncle diagnosed with breast cancer at age 60, two paternal male first cousins diagnosed with breast cancer, four paternal first female cousins diagnosed with breast cancer, paternal grandmother diagnosed with breast cancer at ages 56 and 59, and her father diagnosed with prostate cancer.  Based on her personal and family history, Alison meets current National Comprehensive Cancer Network (NCCN) criteria for genetic testing of high-penetrance breast cancer susceptibility genes (including BRCA1, BRCA2, CDH1, PALB2, PTEN, and TP53).  In looking at Alison's personal and family history, it is possible that a cancer susceptibility gene is present due to her paternal grandmother diagnosed with ovarian cancer at age 64.  Based on her personal and family history, Alison meets current National Comprehensive Cancer Network (NCCN) criteria for genetic testing of high-penetrance ovarian cancer susceptibility genes (including BRCA1, BRCA2, BRIP1, MLH1, MSH2, MSH6, PMS2, EPCAM, PALB2, RAD51C, and RAD51D).  In looking at Alison's personal and family history, it is possible that a cancer susceptibility gene is present due to her brother diagnosed with pancreatic cancer at age 64.    Based on her personal and family history, Alison meets current National Comprehensive Cancer Network (NCCN) criteria for genetic testing of pancreatic cancer susceptibility genes (including EDILBERTO, BRCA1, BRCA2, CDKN2A, EPCAM, MLH1, MSH2, MSH6, PALB2, STK11, and TP53).  We discussed that there are additional  genes that could cause increased risk for breast, ovarian, and pancreatic cancer. As many of these genes present with overlapping features in a family and accurate cancer risk cannot always be established based upon the pedigree analysis alone, it would be reasonable for Alison to consider panel genetic testing to analyze multiple genes at once.  We reviewed genetic testing options for Alison based on her personal and family history: a panel of genes associated with an increased risk for hereditary breast, gynecologic, and pancreatic cancers, or larger panel options to include genes associated with increased risk for multiple different cancer types. Alison expressed interest in the BRCA1 and BRCA2 genes with automatic reflex to the Multi-Cancer Panel through [employer]).   The [facility] Multi-Cancer Panel analyzes 84 genes associated with an increased risk for cancer: AIP, ALK, APC, EDILBERTO, AXIN2, BAP1, BARD1, BLM, BMPR1A, BRCA1, BRCA2, BRIP1, CASR,CDC73, CDH1, CDK4, CDKN1B, CDKN1C, CDKN2A, CEBPA, CHEK2, CTNNA1, DICER1,DIS3L2, EGFR, EPCAM, FH, FLCN, GATA2, GPC3, GREM1, HOXB13, HRAS, KIT, MAX,MEN1, MET, MITF, MLH1, MSH2, MSH3, MSH6, MUTYH, NBN, NF1, NF2, NTHL1,PALB2, PDGFRA, PHOX2B, PMS2, POLD1, POLE, POT1, MWZMD3L, PTCH1, PTEN, RAD50,RAD51C, RAD51D, RB1, RECQL4, RET, RUNX1, SDHA, SDHAF2, SDHB, SDHC, SDHD,SMAD4, SMARCA4, SMARCB1, SMARCE1, STK11, SUFU, TERC, TERT, SWTN400, TP53,TSC1, TSC2, VHL, WRN, WT1.  This panel includes genes associated with hereditary cancers across multiple major organ systems, including:  breast and gynecologic (breast, ovarian, uterine)  gastrointestinal (colorectal, gastric, pancreatic)  endocrine (thyroid, paraganglioma/pheochromocytoma, parathyroid, pituitary)  genitourinary (renal/urinary tract, prostate)  skin (melanoma, basal cell carcinoma)  brain/nervous system   Sarcoma  hematologic (myelodysplastic syndrome/leukemia)  Individuals who are found to carry a pathogenic variant in one of  these genes have an increased risk of developing certain cancers/tumors.  Identifying those at elevated risk may guide implementation of additional screening, surveillance and interventions.    Alison would like to submit a blood sample for her genetic testing. She will go to her nearest [facility] at her earliest convenience to get her blood drawn for her genetic testing.   Verbal consent was given over video and written on the consent form. Turnaround time is approximately 3-4 weeks once the lab receives the sample.    Medical Management: For Alison, we reviewed that the information from genetic testing may determine:    additional cancer screening for which Alison may qualify (i.e. more frequent colonoscopies, more frequent dermatologic exams, etc.),      and targeted chemotherapies if she were to develop certain cancers in the future (i.e. immunotherapy for individuals with Morris syndrome, PARP inhibitors, etc.).     These recommendations and possible targeted chemotherapies will be discussed in detail once genetic testing is completed.     Plan:  1) Today Alison elected to proceed with the BRCA1 and BRCA2 genes with automatic reflex to the Multi-Cancer Panel through [employer]. Therefore, consent was reviewed and verbally obtained for this testing.   2) Alison plans to schedule her blood draw appointment at a clinic that is convenient to her.   3) The results should be available in 3-4 weeks after her blood is drawn.  4) Alison will either have a telephone visit or video visit to discuss the results.  Additional recommendations about screening will be made at that time.    Alison is 57 year old and is being evaluated via a billable video visit.    Time spent on video: 52 minutes    Laurie Baker MS, [facility]  Licensed, Certified Genetic Counselor  Phone: [phone]

## 2022-11-16 NOTE — PATIENT INSTRUCTIONS
Assessing Cancer Risk  Only about 5-10% of cancers are thought to be due to an inherited cancer susceptibility gene.    These families often have:  Several people with the same or related types of cancer  Cancers diagnosed at a young age (before age 50)  Individuals with more than one primary cancer  Multiple generations of the family affected with cancer    Some people may be candidates for genetic testing of more than one gene.  For these families, genetic testing using a cancer panel may be offered.  These panels will test different genes known to increase the risk for breast, ovarian, uterine, and/or other cancers. All of the genes discussed below have published clinical management guidelines for individuals who are found to carry a mutation. The purpose of this handout is to serve as a brief summary of the genes analyzed by the panels used to inquire about hereditary breast and gynecologic cancer:  EDILBERTO, BRCA1, BRCA2, BRIP1, CDH1, CHEK2, MLH1, MSH2, MSH6, PMS2, EPCAM, PTEN, PALB2, RAD51C, RAD51D, and TP53.  ______________________________________________________________________________  Hereditary Breast and Ovarian Cancer Syndrome   (BRCA1 and BRCA2)  A single mutation in one of the copies of BRCA1 or BRCA2 increases the risk for breast and ovarian cancer, among others.  The risk for pancreatic cancer and melanoma may also be slightly increased in some families.  The chart below shows the chance that someone with a BRCA mutation would develop cancer in his or her lifetime1,2,3,4.        A person s ethnic background is also important to consider, as individuals of Ashkenazi Denominational ancestry have a higher chance of having a BRCA gene mutation.  There are three BRCA mutations that occur more frequently in this population.    Morris Syndrome   (MLH1, MSH2, MSH6, PMS2, and EPCAM)  Currently five genes are known to cause Morris Syndrome: MLH1, MSH2, MSH6, PMS2, and EPCAM.  A single mutation in one of the Morris  Syndrome genes increases the risk for colon, endometrial, ovarian, and stomach cancers.  Other cancers that occur less commonly in Morris Syndrome include urinary tract, skin, and brain cancers.  The chart below shows the chance that a person with Morris syndrome would develop cancer in his or her lifetime5.      *Cancer risk varies depending on Morris syndrome gene found    Cowden Syndrome   (PTEN)  Cowden syndrome is a hereditary condition that increases the risk for breast, thyroid, endometrial, colon, and kidney cancer.  Cowden syndrome is caused by a mutation in the PTEN gene.  A single mutation in one of the copies of PTEN causes Cowden syndrome and increases cancer risk.  The chart below shows the chance that someone with a PTEN mutation would develop cancer in their lifetime6,7.  Other benign features seen in some individuals with Cowden syndrome include benign skin lesions (facial papules, keratoses, lipomas), learning disability, autism, thyroid nodules, colon polyps, and larger head size.      *One recent study found breast cancer risk to be increased to 85%    Li-Fraumeni Syndrome   (TP53)  Li-Fraumeni Syndrome (LFS) is a cancer predisposition syndrome caused by a mutation in the TP53 gene. A single mutation in one of the copies of TP53 increases the risk for multiple cancers. Individuals with LFS are at an increased risk for developing cancer at a young age. The lifetime risk for development of a LFS-associated cancer is 50% by age 30 and 90% by age 60.   Core Cancers: Sarcomas, Breast, Brain, Lung, Leukemias/Lymphomas, Adrenocortical carcinomas  Other Cancers: Gastrointestinal, Thyroid, Skin, Genitourinary    Hereditary Diffuse Gastric Cancer   (CDH1)  Currently, one gene is known to cause hereditary diffuse gastric cancer (HDGC): CDH1.  Individuals with HDGC are at increased risk for diffuse gastric cancer and lobular breast cancer. Of people diagnosed with HDGC, 30-50% have a mutation in the CDH1 gene.   This suggests there are likely other genes that may cause HDGC that have not been identified yet.      Lifetime Cancer Risks    General Population HDGC    Diffuse Gastric  <1% ~80%   Breast 12% 39-52%         Additional Genes  EDILBERTO  EDILBERTO is a moderate-risk breast cancer gene. Women who have a mutation in EDILBERTO can have between a 2-4 fold increased risk for breast cancer compared to the general population8. EDILBERTO mutations have also been associated with increased risk for pancreatic cancer, however an estimate of this cancer risk is not well understood9. Individuals who inherit two EDILBERTO mutations have a condition called ataxia-telangiectasia (AT).  This rare autosomal recessive condition affects the nervous system and immune system, and is associated with progressive cerebellar ataxia beginning in childhood.  Individuals with ataxia-telangiectasia often have a weakened immune system and have an increased risk for childhood cancers.    PALB2  Mutations in PALB2 have been shown to increase the risk of breast cancer up to 33-58% in some families; where individuals fall within this risk range is dependent upon family [id number]. PALB2 mutations have also been associated with increased risk for pancreatic cancer, although this risk has not been quantified yet.  Individuals who inherit two PALB2 mutations--one from their mother and one from their father--have a condition called Fanconi Anemia.  This rare autosomal recessive condition is associated with short stature, developmental delay, bone marrow failure, and increased risk for childhood cancers.    CHEK2   CHEK2 is a moderate-risk breast cancer gene.  Women who have a mutation in CHEK2 have around a 2-fold increased risk for breast cancer compared to the general population, and this risk may be higher depending upon family history.11,12,13 Mutations in CHEK2 have also been shown to increase the risk of a number of other cancers, including colon and prostate, however these  cancer risks are currently not well understood.    BRIP1, RAD51C and RAD51D  Mutations in BRIP1, RAD51C, and RAD51D have been shown to increase the risk of ovarian cancer and possibly female breast cancer as well14,15 .       Lifetime Cancer Risk    General Population BRIP1 RAD51C RAD51D   Ovarian 1-2% ~5-8% ~5-9% ~7-15%           Inheritance  All of the cancer syndromes reviewed above are inherited in an autosomal dominant pattern.  This means that if a parent has a mutation, each of his or her children will have a 50% chance of inheriting that same mutation.  Therefore, each child--male or female--would have a 50% chance of being at increased risk for developing cancer.      Image obtained from Genetics Home Reference, 2013     Mutations in some genes can occur de jose, which means that a person s mutation occurred for the first time in them and was not inherited from a parent.  Now that they have the mutation, however, it can be passed on to future generations.    Genetic Testing  Genetic testing involves a blood test and will look at the genetic information in the EDILBERTO, BRCA1, BRCA2, BRIP1, CDH1, CHEK2, MLH1, MSH2, MSH6, PMS2, EPCAM, PTEN, PALB2, RAD51C, RAD51D, and TP53 genes for any harmful mutations that are associated with increased cancer risk.  If possible, it is recommended that the person(s) who has had cancer be tested before other family members.  That person will give us the most useful information about whether or not a specific gene is associated with the cancer in the family.    Results  There are three possible results of genetic testing:  Positive--a harmful mutation was identified in one or more of the genes  Negative--no mutation was identified in any of the genes on this panel  Variant of unknown significance--a variation in one of the genes was identified, but it is unclear how this impacts cancer risk in the family    Advantages and Disadvantages   There are advantages and disadvantages to  genetic testing.    Advantages  May clarify your cancer risk  Can help you make medical decisions  May explain the cancers in your family  May give useful information to your family members (if you share your results)    Disadvantages  Possible negative emotional impact of learning about inherited cancer risk  Uncertainty in interpreting a negative test result in some situations  Possible genetic discrimination concerns (see below)    Genetic Information Nondiscrimination Act (RAMAKRISHNA)  RAMAKRISHNA is a federal law that protects individuals from health insurance or employment discrimination based on a genetic test result alone.  Although rare, there are currently no legal discrimination protections in terms of life insurance, long term care, or disability insurances.  Visit the [employer] [place] website to learn more.    Reducing Cancer Risk  All of the genes described above have nationally recognized cancer screening guidelines that would be recommended for individuals who test positive.  In addition to increased cancer screening, surgeries may be offered or recommended to reduce cancer risk.  Recommendations are based upon an individual s genetic test result as well as their personal and family history of cancer.    Questions to Think About Regarding Genetic Testing:  What effect will the test result have on me and my relationship with my family members if I have an inherited gene mutation?  If I don t have a gene mutation?  Should I share my test results, and how will my family react to this news, which may also affect them?  Are my children ready to learn new information that may one day affect their own health?    Hereditary Cancer Resources    FORCE: Facing Our Risk of Cancer Empowered facingourrisk.org   Bright Pink bebrightpink.org   Li-Fraumeni Syndrome Association lfsassociation.org   PTEN World PTENworld.Yurbuds   No stomach for cancer, Inc. nostomachforcancer.org   Stomach cancer relief network  Scrnet.org   Collaborative Group of the Americas on Inherited Colorectal Cancer (CGA) cgaicc.com    Cancer Care cancercare.org   American Cancer Society (ACS) cancer.org   National Cancer [place] (NCI) cancer.gov     Please call us if you have any questions or concerns.   Cancer Risk Management Program [phone]ANCER ([phone])  Joshua Gallagher, MS [facility] [place]  [phone]  Laurie Samuel, MS, [facility] [place] [phone]  Rosamaria Cervantes, MS, [facility] [place]  [phone]  Fabby Weathers, MS, [facility] [place]  [phone]  Marisa Gregg, MS, [facility] [place]  [phone]  Rozina Regan, MS, [facility] [place] [phone]  Merari Andrews, MS, [facility] [place] [phone]    References  Pawan Harrington PDP, Eliane S, Kelsey TINAJERO, Edison JE, aRj JL, Cindy N, Daxa H, Arti O, Darren A, Neetu B, Raad P, Mansrikanth S, Poncho DM, Emre N, Kanwal E, Gavi H, Robin E, Lubinski J, Gronwald J, Enrique B, Fabienne H, Thorlacius S, Eerola H, Ysabel H, Valeria K, Marbella OP. Average risks of breast and ovarian cancer associated with BRCA1 or BRCA2 mutations detected in case series unselected for family history: a combined analysis of 222 studies. Am J Hum Kerrie. 2003;72:1117-30.  Kavin N, Amaris M, Evaristo G.  BRCA1 and BRCA2 Hereditary Breast and Ovarian Cancer. Gene Reviews online. 2013.  Baldo YC, Danny S, Jg G, Ortiz S. Breast cancer risk among male BRCA1 and BRCA2 mutation carriers. J Natl Cancer Inst. 2007;99:1811-4.  Aneudy POST, Francisco I, Saran J, Tomas E, Miky ER, Jame F. Risk of breast cancer in male BRCA2 carriers. J Med Kerrie. 2010;47:710-1.  National Comprehensive Cancer Network. Clinical practice guidelines in oncology, colorectal cancer screening. Available online (registration required). 2015.  Nomi ARMSTRONG, Eddie J, Evaristo J, Vandana LA, Ronan MS, Eng C. Lifetime cancer risks in individuals with germline PTEN mutations. Clin Cancer Res. 2012;18:400-7.  Louann BARRERA. Cowden Syndrome: A Critical Review of the Clinical Literature. J Kerrie .  2009:18:13-27.  Maddi A, David D, Juan S, Dahiana P, Arlet T, Rosa Isela M, Scot B, Jesús H, Federico R, Corine K, Mauricio L, Aneudy DG, Poncho D, Jermaine DF, Armida MR, The Breast Cancer Susceptibility Collaboration () & Breana TURK. EDILBERTO mutations that cause ataxia-telangiectasia are breast cancer susceptibility alleles. Nature Genetics. 2006;38:873-875  Tejinder N , Mercedes Y, Nelly J, Vy L, Alise GM , Broderick ML, Gallinger S, West AG, Syngal S, Lolita ML, Matthew J , Weston R, Venita SZ, Stephanie JR, Marisa VE, Abelardo M, Vokip B, Destiny N, Kaitlyn RH, Daya KW, and Maryjo AP. EDILBERTO mutations in patients with hereditary pancreatic cancer. Cancer Discover. 2012;2:41-46  Chaka ETIENNE., et al. Breast-Cancer Risk in Families with Mutations in PALB2. NEJM. 2014; 371(6):497-506.  CHEK2 Breast Cancer Case-Control Consortium. CHEK2*1100delC and susceptibility to breast cancer: A collaborative analysis involving 10,860 breast cancer cases and 9,065 controls from 10 studies. Am J Hum Kerrie, 74 (2004), pp. 3713-5615  Jung T, Thanh S, Bonilla K, et al. Spectrum of Mutations in BRCA1, BRCA2, CHEK2, and TP53 in Families at High Risk of Breast Cancer. LEANNA. 2006;295(12):5088-7548.   Yue C, Adam D, Antony A, et al. Risk of breast cancer in women with a CHEK2 mutation with and without a family history of breast cancer. J Clin Oncol. 2011;29:5852-8601.  Ced H, Ying E, Luiz SJ, et al. Contribution of germline mutations in the RAD51B, RAD51C, and RAD51D genes to ovarian cancer in the population. J Clin Oncol. 2015;33(26):5045-3013. Doi:10.1200/JCO.2015.61.2408.  Lulu Aguirreon DF, Tuan P, et al. Mutations in BRIP1 confer high risk of ovarian cancer. Minerva Kerrie. 2011;43(11):8531-9167. doi:10.1038/ng.955.

## 2022-11-18 ENCOUNTER — LAB (OUTPATIENT)
Dept: LAB | Facility: CLINIC | Age: 57
End: 2022-11-18
Payer: COMMERCIAL

## 2022-11-18 ENCOUNTER — TELEPHONE (OUTPATIENT)
Dept: FAMILY MEDICINE | Facility: CLINIC | Age: 57
End: 2022-11-18

## 2022-11-18 DIAGNOSIS — C50.911 MALIGNANT NEOPLASM OF RIGHT FEMALE BREAST, UNSPECIFIED ESTROGEN RECEPTOR STATUS, UNSPECIFIED SITE OF BREAST (H): ICD-10-CM

## 2022-11-18 DIAGNOSIS — F34.1 DYSTHYMIA: Primary | ICD-10-CM

## 2022-11-18 DIAGNOSIS — Z80.3 FAMILY HISTORY OF MALIGNANT NEOPLASM OF BREAST: ICD-10-CM

## 2022-11-18 DIAGNOSIS — Z80.1 FAMILY HISTORY OF LUNG CANCER: ICD-10-CM

## 2022-11-18 DIAGNOSIS — Z80.41 FAMILY HISTORY OF MALIGNANT NEOPLASM OF OVARY: ICD-10-CM

## 2022-11-18 DIAGNOSIS — Z80.42 FAMILY HISTORY OF PROSTATE CANCER: ICD-10-CM

## 2022-11-18 DIAGNOSIS — Z80.0 FAMILY HISTORY OF COLON CANCER: ICD-10-CM

## 2022-11-18 DIAGNOSIS — Z80.8 FAMILY HISTORY OF NONMELANOMA SKIN CANCER: ICD-10-CM

## 2022-11-18 DIAGNOSIS — Z80.0 FAMILY HISTORY OF PANCREATIC CANCER: ICD-10-CM

## 2022-11-18 DIAGNOSIS — Z80.8 FAMILY HISTORY OF MELANOMA: ICD-10-CM

## 2022-11-18 PROCEDURE — 36415 COLL VENOUS BLD VENIPUNCTURE: CPT

## 2022-11-18 PROCEDURE — 99000 SPECIMEN HANDLING OFFICE-LAB: CPT

## 2022-11-18 NOTE — TELEPHONE ENCOUNTER
CJ - pt came by station requesting to start Wellbutrin.  States she has talked with you about this prior.  Pharmacy t'd up.  Atnonia Chapin, CMA

## 2022-11-21 RX ORDER — BUPROPION HYDROCHLORIDE 150 MG/1
150 TABLET ORAL EVERY MORNING
Qty: 90 TABLET | Refills: 1 | Status: SHIPPED | OUTPATIENT
Start: 2022-11-21 | End: 2023-01-17

## 2022-11-21 NOTE — TELEPHONE ENCOUNTER
Please call pt and let her know I've sent this in.     I would like a follow-up appointment in 4-6 weeks. Okay for phone/video    Susan Chaves PA-C

## 2022-11-21 NOTE — TELEPHONE ENCOUNTER
Spoke with pt and informed her that her prescription was sent in to the pharmacy. Also made follow-up appointment for 01/03/2023 with ROSEANNE.   Odalis Mendoza MA

## 2022-11-29 NOTE — PROGRESS NOTES
Alison is a 57 year old who is being evaluated via a billable video visit.    How would you like to obtain your AVS? MyChart  If the video visit is dropped, the invitation should be resent by: Text to cell phone: [phone]  Will anyone else be joining your video visit? Savannah Knottmartell RHODESAbimbola Acosta VF    Video-Visit Details  Video Start Time: 8:00 AM  Type of service:  Video Visit  Video End Time:8:15 AM  Originating Location (pt. Location): Home  Distant Location (provider location):  Off-site  Platform used for Video Visit: LinguaSys    12/2/2022    Referring Provider: Romel Pagan PA-C    Presenting Information:  I spoke to Alison today to discuss her genetic testing results. Her blood was drawn on 11/18/2022. The BRCA1 and BRCA2 genes with automatic reflex to the Multi-Cancer Panel was ordered from [facility]. This testing was done because of Alison's personal history of breast cancer and family history of female and male breast cancer, pancreatic cancer, and ovarian cancer.    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      FH gene. This variant is called c.648T>A (p.Nsw186Rcy).    MET gene. This variant is called c.3740T>C (p.[id number]).    No other variants or mutations were found in these genes. Given the uncertain significance of this result, medical management decisions should NOT be made based on this test result alone.    Of note, Alison tested negative for mutations in the following genes by sequencing and deletion/duplication analysis: AIP, ALK, APC, EDILBERTO, AXIN2, BAP1, BARD1, BLM, BMPR1A, BRCA1, BRCA2, BRIP1, CASR,CDC73, CDH1, CDK4, CDKN1B, CDKN1C, CDKN2A, CEBPA, CHEK2, CTNNA1, DICER1,DIS3L2, EGFR, EPCAM, FLCN, GATA2, GPC3, GREM1, HOXB13, HRAS, KIT, MAX,MEN1, MITF, MLH1, MSH2, MSH3, MSH6, MUTYH, NBN, NF1, NF2, NTHL1,PALB2, PDGFRA, PHOX2B, PMS2, POLD1, POLE, POT1, BUIFK3G, PTCH1, PTEN, RAD50,RAD51C,  "RAD51D, RB1, RECQL4, RET, RUNX1, SDHA, SDHAF2, SDHB, SDHC, SDHD,SMAD4, SMARCA4, SMARCB1, SMARCE1, STK11, SUFU, TERC, TERT, HMEB531, TP53,TSC1, TSC2, VHL, WRN, WT1. We reviewed the autosomal dominant inheritance of these genes. Alison cannot pass on a mutation in any of these genes to her children based on this test result. Mutations in these genes do not skip generations.      A copy of the test report can be found in the Laboratory tab, dated 2022, and named \"LABORATORY MISCELLANEOUS ORDER\". The report is scanned in as a linked document.    Interpretation:  We discussed several different interpretations of this inconclusive test result. It is not clear if any of these variants are associated with increased cancer risk. These variants may be benign changes that do not increased cancer risk, or they may be harmful mutations that cause increased risk for certain cancers.    FH (c.648T>A, p.Xvz758Scy)    Harmful mutations in the FH gene are associated with increased risk for leiomyomas, kidney cancer, and may be associated with pheochromocytomas (PCC) and paragangliomas (PGCs). In rare situations in which both parents have a mutation in the FH gene, their children each have a 25% risk for Fumarate Hydratase (FH) deficiency. FH deficiency is a rare autosomal recessive disorder that typically presents in childhood and can present with  brain abnormalities, failure-to-thrive, low muscle tone, and seizures. We discussed that medical management decisions are NOT recommended for individuals with a VUS result.    MET (c.3740T>C, p.[id number])    Harmful mutations in the MET gene are associated with increased risk for hereditary papillary renal cell carcinoma (HPRCC) and recessive deafness (person needs two harmful mutations). We discussed that medical management decisions are NOT recommended for individuals with a VUS result.    The laboratory is working to determine if any of these variants are harmful or " benign, and they will contact me if any of them are reclassified. If these variants are determined to be benign, there may be a different gene or combination of genes and environment that are associated with the cancers in this family.    It is also important to consider that her relatives diagnosed with cancer may have had a mutation in one of the genes tested and she did not inherit it.    Inheritance:  We reviewed the autosomal dominant inheritance of the variants in the FH and MET genes.  We discussed that Alison has a 50% chance to pass each of these variants to each of her children. Likewise, there is a 50% chance for each of these variants to be present in each of her siblings. Because it is unclear what, if any, risk is associated with these variants, clinical genetic testing for these FH and MET variants alone is not recommended for relatives.    Family Studies:  The laboratory may offer additional research based testing for specific relatives in order to help reclassify these variants.    Screening:  Based on this inconclusive test result, it is important for Alison and her relatives to refer back to the family history for appropriate cancer screening.    Alison should continue to follow up with her oncology team as recommended about her breast cancer surveillance.     Alison s close female relatives remain at increased risk for breast cancer given their family history. Breast cancer screening is generally recommended to begin approximately 10 years younger than the earliest age of breast cancer diagnosis in the family, or at age 40, whichever comes first. In this family, screening may begin at age 34. Breast screening options should be discussed with an individual's primary care provider and a genetic counselor, to determine at what age to begin screening, what screening is appropriate, and if additional screening (such as breast MRI) is necessary based on personal/family history factors.     Due to Alison s  family history of pancreatic cancer, screening for pancreatic cancer may be considered. This screening typically begins at age 50 and involves endoscopic ultrasonography (EUS) and/or MRI/magnetic resonance cholangiopancreatography (Darren et al, Gut 2013. 62: 339-347; Earnest FLORES, et al., Am J Gastroenterol 2015. 110:223-262). Given the significant limitations of this screening, Alison could consider meeting with her primary care provider to discuss this screening in more detail.    Other population cancer screening options, such as those recommended by the American Cancer Society and the National Comprehensive Cancer Network (NCCN), are also appropriate for Alison and her family. These screening recommendations may change if there are changes to Alison's personal and/or family history of cancer.     Final screening recommendations should be made by each individual's primary care provider.     Additional Testing Considerations:  Although Alisno's genetic testing result is inconclusive, other relatives may still carry a harmful gene mutation associated with cancer. Genetic counseling is recommended for Alison's siblings, the children of Alison's brother who was diagnosed with pancreatic cancer, and her other paternal relatives to discuss genetic testing options. If any of these relatives do pursue genetic testing, Alison is encouraged to contact me so that we may review the impact of their test results on her    Summary:  We do not have an explanation for Alison's personal and family history of cancer. While no genetic changes were identified, Alison may still be at risk for certain cancers due to family history, environmental factors, or other genetic causes not identified by this test.  Because of that, it is important that she continue with cancer screening based on her personal and family history as discussed above.    Genetic testing is rapidly advancing, and new cancer susceptibility genes will most likely be identified in  the future.  Therefore, I encouraged Alison to contact me annually or if there are changes in her personal or family history.  This may change how we assess her cancer risk, screening, and the testing we would offer.    Plan:  1.  A copy of the test results will be mailed to Alison.   2. She plans to follow-up with her other providers.  3. She should contact me regularly, or sooner if her family history changes.  4. I will attempt to contact Alison if the laboratory informs me that these variants have been reclassified.  This may change screening and testing recommendations for Alison and her relatives.    If Alison has any further questions, I encouraged her to contact me at [phone].    Time spent on video: 15 minutes    Laurie Baker MS, [facility] [place]  Licensed, Certified Genetic Counselor  Phone: [phone]

## 2022-12-02 ENCOUNTER — VIRTUAL VISIT (OUTPATIENT)
Dept: ONCOLOGY | Facility: CLINIC | Age: 57
End: 2022-12-02
Payer: COMMERCIAL

## 2022-12-02 DIAGNOSIS — Z80.0 FAMILY HISTORY OF COLON CANCER: ICD-10-CM

## 2022-12-02 DIAGNOSIS — Z80.41 FAMILY HISTORY OF MALIGNANT NEOPLASM OF OVARY: ICD-10-CM

## 2022-12-02 DIAGNOSIS — C50.911 MALIGNANT NEOPLASM OF RIGHT FEMALE BREAST, UNSPECIFIED ESTROGEN RECEPTOR STATUS, UNSPECIFIED SITE OF BREAST (H): Primary | ICD-10-CM

## 2022-12-02 DIAGNOSIS — Z80.3 FAMILY HISTORY OF MALIGNANT NEOPLASM OF BREAST: ICD-10-CM

## 2022-12-02 DIAGNOSIS — Z80.1 FAMILY HISTORY OF LUNG CANCER: ICD-10-CM

## 2022-12-02 DIAGNOSIS — Z80.8 FAMILY HISTORY OF MELANOMA: ICD-10-CM

## 2022-12-02 DIAGNOSIS — Z80.0 FAMILY HISTORY OF PANCREATIC CANCER: ICD-10-CM

## 2022-12-02 DIAGNOSIS — Z80.42 FAMILY HISTORY OF PROSTATE CANCER: ICD-10-CM

## 2022-12-02 DIAGNOSIS — Z80.8 FAMILY HISTORY OF NONMELANOMA SKIN CANCER: ICD-10-CM

## 2022-12-02 PROCEDURE — 999N000069 HC STATISTIC GENETIC COUNSELING, < 16 MIN: Mod: GT,95

## 2022-12-02 NOTE — LETTER
Cancer Risk Management  Program Locations    [facility]  [facility]  [facility]  Mailing Address  Cancer Risk Management Program  [address]  [place] [address]    New patient appointments  [phone]  December 2, 2022    Alison ROSEANN Barfield  [address] [facility] [address]    Dear Alison,    It was a pleasure talking with you via your virtual genetic counseling visit on 12/2/2022.  Below is a copy of the progress note from that recent visit through the Cancer Risk Management Program.  If you have any additional questions, please feel free to contact me.    Referring Provider: Romel Pagan PA-C    Presenting Information:  I spoke to Alison today to discuss her genetic testing results. Her blood was drawn on 11/18/2022. The BRCA1 and BRCA2 genes with automatic reflex to the Multi-Cancer Panel was ordered from [employer]. This testing was done because of Alison's personal history of breast cancer and family history of female and male breast cancer, pancreatic cancer, and ovarian cancer.    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      FH gene. This variant is called c.648T>A (p.Txp978Fkb).    MET gene. This variant is called c.3740T>C (p.[id number]).    No other variants or mutations were found in these genes. Given the uncertain significance of this result, medical management decisions should NOT be made based on this test result alone.    Of note, Alison tested negative for mutations in the following genes by sequencing and deletion/duplication analysis: AIP, ALK, APC, EDILBERTO, AXIN2, BAP1, BARD1, BLM, BMPR1A, BRCA1, BRCA2, BRIP1, CASR,CDC73, CDH1, CDK4, CDKN1B, CDKN1C, CDKN2A, CEBPA, CHEK2, CTNNA1,  "DICER1,DIS3L2, EGFR, EPCAM, FLCN, GATA2, GPC3, GREM1, HOXB13, HRAS, KIT, MAX,MEN1, MITF, MLH1, MSH2, MSH3, MSH6, MUTYH, NBN, NF1, NF2, NTHL1,PALB2, PDGFRA, PHOX2B, PMS2, POLD1, POLE, POT1, HKBHQ8H, PTCH1, PTEN, RAD50,RAD51C, RAD51D, RB1, RECQL4, RET, RUNX1, SDHA, SDHAF2, SDHB, SDHC, SDHD,SMAD4, SMARCA4, SMARCB1, SMARCE1, STK11, SUFU, TERC, TERT, XAQJ377, TP53,TSC1, TSC2, VHL, WRN, WT1. We reviewed the autosomal dominant inheritance of these genes. Alison cannot pass on a mutation in any of these genes to her children based on this test result. Mutations in these genes do not skip generations.      A copy of the test report can be found in the Laboratory tab, dated 2022, and named \"LABORATORY MISCELLANEOUS ORDER\". The report is scanned in as a linked document.    Interpretation:  We discussed several different interpretations of this inconclusive test result. It is not clear if any of these variants are associated with increased cancer risk. These variants may be benign changes that do not increased cancer risk, or they may be harmful mutations that cause increased risk for certain cancers.    FH (c.648T>A, p.Doz207Slr)    Harmful mutations in the FH gene are associated with increased risk for leiomyomas, kidney cancer, and may be associated with pheochromocytomas (PCC) and paragangliomas (PGCs). In rare situations in which both parents have a mutation in the FH gene, their children each have a 25% risk for Fumarate Hydratase (FH) deficiency. FH deficiency is a rare autosomal recessive disorder that typically presents in childhood and can present with  brain abnormalities, failure-to-thrive, low muscle tone, and seizures. We discussed that medical management decisions are NOT recommended for individuals with a VUS result.  MET (c.3740T>C, p.[id number])    Harmful mutations in the MET gene are associated with increased risk for hereditary papillary renal cell carcinoma (HPRCC) and recessive deafness " (person needs two harmful mutations). We discussed that medical management decisions are NOT recommended for individuals with a VUS result.    The laboratory is working to determine if any of these variants are harmful or benign, and they will contact me if any of them are reclassified. If these variants are determined to be benign, there may be a different gene or combination of genes and environment that are associated with the cancers in this family.    It is also important to consider that her relatives diagnosed with cancer may have had a mutation in one of the genes tested and she did not inherit it.    Inheritance:  We reviewed the autosomal dominant inheritance of the variants in the FH and MET genes.  We discussed that Alison has a 50% chance to pass each of these variants to each of her children. Likewise, there is a 50% chance for each of these variants to be present in each of her siblings. Because it is unclear what, if any, risk is associated with these variants, clinical genetic testing for these FH and MET variants alone is not recommended for relatives.    Family Studies:  The laboratory may offer additional research based testing for specific relatives in order to help reclassify these variants.    Screening:  Based on this inconclusive test result, it is important for Alison and her relatives to refer back to the family history for appropriate cancer screening.    Alison should continue to follow up with her oncology team as recommended about her breast cancer surveillance.     Alison s close female relatives remain at increased risk for breast cancer given their family history. Breast cancer screening is generally recommended to begin approximately 10 years younger than the earliest age of breast cancer diagnosis in the family, or at age 40, whichever comes first. In this family, screening may begin at age 34. Breast screening options should be discussed with an individual's primary care provider and  a genetic counselor, to determine at what age to begin screening, what screening is appropriate, and if additional screening (such as breast MRI) is necessary based on personal/family history factors.     Due to Alison s family history of pancreatic cancer, screening for pancreatic cancer may be considered. This screening typically begins at age 50 and involves endoscopic ultrasonography (EUS) and/or MRI/magnetic resonance cholangiopancreatography (Darren et al, Gut 2013. 62: 339-347; Earnest S, et al., Am J Gastroenterol 2015. 110:223-262). Given the significant limitations of this screening, Alison could consider meeting with her primary care provider to discuss this screening in more detail.    Other population cancer screening options, such as those recommended by the American Cancer Society and the National Comprehensive Cancer Network (NCCN), are also appropriate for Alison and her family. These screening recommendations may change if there are changes to Alison's personal and/or family history of cancer.     Final screening recommendations should be made by each individual's primary care provider.     Additional Testing Considerations:  Although Alison's genetic testing result is inconclusive, other relatives may still carry a harmful gene mutation associated with cancer. Genetic counseling is recommended for Alison's siblings, the children of Alison's brother who was diagnosed with pancreatic cancer, and her other paternal relatives to discuss genetic testing options. If any of these relatives do pursue genetic testing, Alison is encouraged to contact me so that we may review the impact of their test results on her    Summary:  We do not have an explanation for Alison's personal and family history of cancer. While no genetic changes were identified, Alison may still be at risk for certain cancers due to family history, environmental factors, or other genetic causes not identified by this test.  Because of that, it is  important that she continue with cancer screening based on her personal and family history as discussed above.    Genetic testing is rapidly advancing, and new cancer susceptibility genes will most likely be identified in the future.  Therefore, I encouraged Alison to contact me annually or if there are changes in her personal or family history.  This may change how we assess her cancer risk, screening, and the testing we would offer.    Plan:  1.  A copy of the test results will be mailed to Alison.   2. She plans to follow-up with her other providers.  3. She should contact me regularly, or sooner if her family history changes.  4. I will attempt to contact Alison if the laboratory informs me that these variants have been reclassified.  This may change screening and testing recommendations for Alison and her relatives.    If Alison has any further questions, I encouraged her to contact me at [phone].    Time spent on video: 15 minutes    Laurie Baker MS, [facility]  Licensed, Certified Genetic Counselor  Phone: [phone]

## 2022-12-02 NOTE — PATIENT INSTRUCTIONS
Genetic Testing  Genetic testing involved a blood or saliva test which looked at the genetic information in select genes for variants associated with cancer risk.  This testing may have included analysis of a single gene due to a known variant in the family, multiple genes most associated with the cancers in a family, or an expanded panel of genes related to many types of cancers.    Results  There are several possible genetic test results, including:   Positive--a harmful mutation (also known as a  pathogenic  or  likely pathogenic  variant) was identified in a gene associated with increased cancer risk.  These risks, as well as medical management options, depend on the specific genetic variant identified.    Negative--no variants were identified in the genes analyzed   Variant of unknown significance--a variant was identified in one or more genes, though it is currently unclear how this impacts cancer risk in the family.  Genetic testing labs are working to collect evidence about these uncertain variants and may provide updates in the future.    What is a Variant of Unknown Significance?  A variant of unknown significance (VUS) is a genetic change with unclear clinical significance.  Scientists currently do not know if this specific variant is associated with increased cancer risks,  or if it is a benign (harmless) change with no impact on health.       A variant may be of uncertain significance for several reasons.  It is possible that this specific variant has not been seen before by the laboratory, or only in a small number of families.  There is currently not enough information to know how this variant may impact your health.          Reclassification  Genetic testing laboratories and researchers are collecting evidence to determine the importance of variants of unknown significance.  Many variants of uncertain significance are later reclassified as benign findings, however some may be associated with  increased cancer risk.  Laboratories will often notify the genetic counselor/ordering provider when a patient s VUS has been reclassified.        Some families may be candidates for participation in the laboratory s variant research programs to help determine the importance of their VUS.  Participating in these programs does not guarantee that families will learn the significance of their VUS immediately.  It could be months or years before a VUS is reclassified.       Screening Recommendations  A combination of personal and family history factors may inform cancer risk and medical management recommendations.  Population cancer screening options, such as those recommended by the American Cancer Society and the National Comprehensive Cancer Network (NCCN) are appropriate for many families at average risk for cancer.  However, earlier and/or more frequent screening may be recommended based on personal factors (lifestyle, exposures, medications, screening results), family history of cancer, and sometimes genetic factors.  These cancer risk management options should be discussed in more detail with an individual's medical providers.      It is usually not recommended that other relatives have genetic testing for the VUS unless it is done as part of the laboratory s variant research program because an individual s test results should not influence their cancer screening until we determine the importance of the VUS.  Your genetic counselor can help you and your relatives understand the risks and benefits of all genetic testing and cancer screening options.    Please call us if you have any questions or concerns.   Cancer Risk Management Program (Appointments: [phone])  Joshua Gallagher, MS [facility]  [phone]  Laurie Baker, MS, [facility] [phone]  Rosamaria Cervantes, MS, [facility]  [phone]  Fabby Weathers, MS, [facility]  [phone]  Marisa Escobedo, MS, [facility]  [phone]  Rozina Regan, MS, [facility] [phone]  Merari Andrews MS,  [facility] [place] [phone]

## 2022-12-02 NOTE — Clinical Note
"Hello,    Please enclose a copy of the test report from the laboratory tab titled \"LAB MISC ORDER\" dated 11/18/2022 to send to the patient along with the letter.    Referring provider: please see summary of genetic test results below.    Thank you,    Laurie Baker MS, [facility] [place]  Licensed, Certified Genetic Counselor"

## 2023-01-03 ENCOUNTER — VIRTUAL VISIT (OUTPATIENT)
Dept: FAMILY MEDICINE | Facility: CLINIC | Age: 58
End: 2023-01-03
Payer: COMMERCIAL

## 2023-01-03 ENCOUNTER — MYC MEDICAL ADVICE (OUTPATIENT)
Dept: FAMILY MEDICINE | Facility: CLINIC | Age: 58
End: 2023-01-03

## 2023-01-03 DIAGNOSIS — C50.911 MALIGNANT NEOPLASM OF RIGHT FEMALE BREAST, UNSPECIFIED ESTROGEN RECEPTOR STATUS, UNSPECIFIED SITE OF BREAST (H): ICD-10-CM

## 2023-01-03 DIAGNOSIS — E66.01 MORBID OBESITY (H): ICD-10-CM

## 2023-01-03 DIAGNOSIS — F33.0 MILD EPISODE OF RECURRENT MAJOR DEPRESSIVE DISORDER (H): Primary | ICD-10-CM

## 2023-01-03 DIAGNOSIS — F34.1 DYSTHYMIA: ICD-10-CM

## 2023-01-03 DIAGNOSIS — J01.90 ACUTE NON-RECURRENT SINUSITIS, UNSPECIFIED LOCATION: ICD-10-CM

## 2023-01-03 PROCEDURE — 99214 OFFICE O/P EST MOD 30 MIN: CPT | Mod: TEL | Performed by: PHYSICIAN ASSISTANT

## 2023-01-03 RX ORDER — FLUCONAZOLE 150 MG/1
TABLET ORAL
Qty: 3 TABLET | Refills: 1 | Status: SHIPPED | OUTPATIENT
Start: 2023-01-03

## 2023-01-03 ASSESSMENT — ANXIETY QUESTIONNAIRES
6. BECOMING EASILY ANNOYED OR IRRITABLE: NOT AT ALL
3. WORRYING TOO MUCH ABOUT DIFFERENT THINGS: SEVERAL DAYS
5. BEING SO RESTLESS THAT IT IS HARD TO SIT STILL: NOT AT ALL
7. FEELING AFRAID AS IF SOMETHING AWFUL MIGHT HAPPEN: SEVERAL DAYS
GAD7 TOTAL SCORE: 6
1. FEELING NERVOUS, ANXIOUS, OR ON EDGE: MORE THAN HALF THE DAYS
2. NOT BEING ABLE TO STOP OR CONTROL WORRYING: SEVERAL DAYS
IF YOU CHECKED OFF ANY PROBLEMS ON THIS QUESTIONNAIRE, HOW DIFFICULT HAVE THESE PROBLEMS MADE IT FOR YOU TO DO YOUR WORK, TAKE CARE OF THINGS AT HOME, OR GET ALONG WITH OTHER PEOPLE: SOMEWHAT DIFFICULT
GAD7 TOTAL SCORE: 6

## 2023-01-03 ASSESSMENT — PATIENT HEALTH QUESTIONNAIRE - PHQ9
5. POOR APPETITE OR OVEREATING: SEVERAL DAYS
SUM OF ALL RESPONSES TO PHQ QUESTIONS 1-9: 7

## 2023-01-03 NOTE — PROGRESS NOTES
Alison is a 57 year old who is being evaluated via a billable telephone visit.      What phone number would you like to be contacted at? [phone]  How would you like to obtain your AVS? MyChart  Distant Location (provider location):  Off-site    Assessment & Plan     Mild major depression, recurrrent  (H)  Will increase dose to 300 mg  Will mychart pt in 2 weeks to check in. increase dose to 300 mg (will take 2 tablets of 150 mg)    If no improvement will change to new med      Morbid obesity (H)  Diet and exercise    Malignant neoplasm of right female breast, unspecified estrogen receptor status, unspecified site of breast (H)  No changes with treatment plan. No new symptoms.     Acute non-recurrent sinusitis, unspecified location  Supportive cares.   - amoxicillin-clavulanate (AUGMENTIN) 875-125 MG tablet; Take 1 tablet by mouth 2 times daily for 10 days  - fluconazole (DIFLUCAN) 150 MG tablet; Take 1 tablet every 3 days.                 No follow-ups on file.    NICOLE Devine [facility]   Alison is a 57 year old presenting for the following health issues:  No chief complaint on file.      HPI     Depression and Anxiety Follow-Up    How are you doing with your depression since your last visit? No change    How are you doing with your anxiety since your last visit?  No change    Are you having other symptoms that might be associated with depression or anxiety? No-nothing new since the last OV-no desire in wanting do things, hard time focusing    Have you had a significant life event? No     Do you have any concerns with your use of alcohol or other drugs? No    Social History     Tobacco Use     Smoking status: Former     Types: Cigarettes     Quit date:      Years since quittin.0     Smokeless tobacco: Never     Tobacco comments:     Quit in    Vaping Use     Vaping Use: Never used   Substance Use Topics     Alcohol use: No     Comment: none      Drug use: No     PHQ 11/2/2021 8/31/2022 11/2/2022   PHQ-9 Total Score 3 2 7   Q9: Thoughts of better off dead/self-harm past 2 weeks Not at all Not at all Not at all     EDGAR-7 SCORE 8/22/2019 11/19/2020 11/2/2022   Total Score - - -   Total Score 0 (minimal anxiety) 4 (minimal anxiety) 6 (mild anxiety)   Total Score 0 4 6     Last PHQ-9 1/3/2023   1.  Little interest or pleasure in doing things 2   2.  Feeling down, depressed, or hopeless 0   3.  Trouble falling or staying asleep, or sleeping too much 1   4.  Feeling tired or having little energy 2   5.  Poor appetite or overeating 0   6.  Feeling bad about yourself 0   7.  Trouble concentrating 2   8.  Moving slowly or restless 0   Q9: Thoughts of better off dead/self-harm past 2 weeks 0   PHQ-9 Total Score 7   Difficulty at work, home, or with people Somewhat difficult     EDGAR-7  1/3/2023   1. Feeling nervous, anxious, or on edge 2   2. Not being able to stop or control worrying 1   3. Worrying too much about different things 1   4. Trouble relaxing 1   5. Being so restless that it is hard to sit still 0   6. Becoming easily annoyed or irritable 0   7. Feeling afraid, as if something awful might happen 1   EDGAR-7 Total Score 6   If you checked any problems, how difficult have they made it for you to do your work, take care of things at home, or get along with other people? Somewhat difficult       Suicide Assessment Five-step Evaluation and Treatment (SAFE-T)      How many servings of fruits and vegetables do you eat daily?  2-3    On average, how many sweetened beverages do you drink each day (Examples: soda, juice, sweet tea, etc.  Do NOT count diet or artificially sweetened beverages)?   0    How many days per week do you exercise enough to make your heart beat faster? 3 or less    How many minutes a day do you exercise enough to make your heart beat faster? 9 or less  How many days per week do you miss taking your medication? 1    What makes it hard for you to  take your medications?  remembering to take-very rare will forget    Acute Illness   Acute illness concerns: sinus  Onset: 6 days ago     Fever: YES    Chills/Sweats: no    Headache (location?): no    Sinus Pressure:YES    Conjunctivitis:  no    Ear Pain: no    Rhinorrhea: YES    Congestion: YES    Sore Throat: no     Cough: no    Wheeze: no    Decreased Appetite: no    Nausea: no    Vomiting: no    Diarrhea:  no    Dysuria/Freq.: no    Fatigue/Achiness: no    Sick/Strep Exposure: no     Therapies Tried and outcome:          Review of Systems   Constitutional, HEENT, cardiovascular, pulmonary, gi and gu systems are negative, except as otherwise noted.      Objective           Vitals:  No vitals were obtained today due to virtual visit.    Physical Exam   healthy, alert and no distress  PSYCH: Alert and oriented times 3; coherent speech, normal   rate and volume, able to articulate logical thoughts, able   to abstract reason, no tangential thoughts, no hallucinations   or delusions  Her affect is normal and pleasant  RESP: No cough, no audible wheezing, able to talk in full sentences  Remainder of exam unable to be completed due to telephone visits                Phone call duration: 15 minutes

## 2023-01-17 RX ORDER — BUPROPION HYDROCHLORIDE 300 MG/1
300 TABLET ORAL EVERY MORNING
Qty: 90 TABLET | Refills: 1 | Status: SHIPPED | OUTPATIENT
Start: 2023-01-17 | End: 2023-11-13

## 2023-05-31 ENCOUNTER — VIRTUAL VISIT (OUTPATIENT)
Dept: FAMILY MEDICINE | Facility: CLINIC | Age: 58
End: 2023-05-31
Payer: COMMERCIAL

## 2023-05-31 DIAGNOSIS — J01.90 ACUTE BACTERIAL SINUSITIS: Primary | ICD-10-CM

## 2023-05-31 DIAGNOSIS — I10 ESSENTIAL HYPERTENSION WITH GOAL BLOOD PRESSURE LESS THAN 140/90: ICD-10-CM

## 2023-05-31 DIAGNOSIS — B37.31 YEAST INFECTION OF THE VAGINA: ICD-10-CM

## 2023-05-31 DIAGNOSIS — B96.89 ACUTE BACTERIAL SINUSITIS: Primary | ICD-10-CM

## 2023-05-31 PROCEDURE — 99214 OFFICE O/P EST MOD 30 MIN: CPT | Mod: VID | Performed by: NURSE PRACTITIONER

## 2023-05-31 RX ORDER — METOPROLOL SUCCINATE 25 MG/1
TABLET, EXTENDED RELEASE ORAL
Qty: 90 TABLET | Refills: 0 | Status: SHIPPED | OUTPATIENT
Start: 2023-05-31 | End: 2023-11-17

## 2023-05-31 RX ORDER — DOXYCYCLINE 100 MG/1
100 CAPSULE ORAL 2 TIMES DAILY
Qty: 14 CAPSULE | Refills: 0 | Status: SHIPPED | OUTPATIENT
Start: 2023-05-31 | End: 2023-06-07

## 2023-05-31 RX ORDER — FLUCONAZOLE 150 MG/1
150 TABLET ORAL ONCE
Qty: 1 TABLET | Refills: 0 | Status: SHIPPED | OUTPATIENT
Start: 2023-05-31 | End: 2023-05-31

## 2023-05-31 NOTE — PROGRESS NOTES
"Alison is a 57 year old who is being evaluated via a billable video visit.      How would you like to obtain your AVS? MyChart  If the video visit is dropped, the invitation should be resent by: Text to cell phone: [phone]  Will anyone else be joining your video visit? No          Assessment & Plan     Acute bacterial sinusitis  Given duration and history of recurrent bacterial sinusitis, will plan for antibiotic therapy. Will use doxycycline given Augmentin in 1/2023. Discussed medication use, risks, benefits, and side effects.   To continue with over the counter medications as needed for symptom relief.   - doxycycline hyclate (VIBRAMYCIN) 100 MG capsule  Dispense: 14 capsule; Refill: 0    Yeast infection of the vagina  Recurrent yeast following antibiotic use. Refilled.   - fluconazole (DIFLUCAN) 150 MG tablet  Dispense: 1 tablet; Refill: 0               BMI:   Estimated body mass index is 33.61 kg/m  as calculated from the following:    Height as of 11/2/22: 1.683 m (5' 6.25\").    Weight as of 11/2/22: 95.2 kg (209 lb 12.8 oz).           REX Olsen CNP  [facility]    Subjective   Alison is a 57 year old, presenting for the following health issues:  URI    HPI     Acute Illness    Onset/Duration: 2 weeks  Symptoms:  Fever: YES- the first few days  Chills/Sweats: No  Headache (location?): YES  Sinus Pressure: YES  Conjunctivitis:  YES  Ear Pain: YES: both  Rhinorrhea: YES  Congestion: YES  Sore Throat: YES- the first few days  Cough: YES  Wheeze: No  Decreased Appetite: YES  Nausea: No  Vomiting: No  Diarrhea: No  Dysuria/Freq.: No  Dysuria or Hematuria: No  Fatigue/Achiness: YES  Sick/Strep Exposure: YES- grandchildren  Therapies tried and outcome: mucinex, tylenol, ibuprofen, vitamins, flonase      Having discomfort to frontal and maxillary sinuses.   Exposed to grandchildren with cold. They have improved. Patient lingers with sinus symptoms that are not improving.   Used " Augmentin in [place]      Review of Systems   Constitutional, HEENT, cardiovascular, pulmonary, gi and gu systems are negative, except as otherwise noted.      Objective           Vitals:  No vitals were obtained today due to virtual visit.    Physical Exam   GENERAL: Healthy, alert and no distress  EYES: Eyes grossly normal to inspection.  No discharge or erythema, or obvious scleral/conjunctival abnormalities.  RESP: No audible wheeze, cough, or visible cyanosis.  No visible retractions or increased work of breathing.    SKIN: Visible skin clear. No significant rash, abnormal pigmentation or lesions.  NEURO: Cranial nerves grossly intact.  Mentation and speech appropriate for age.  PSYCH: Mentation appears normal, affect normal/bright, judgement and insight intact, normal speech and appearance well-groomed.                Video-Visit Details    Type of service:  Video Visit     Originating Location (pt. Location): Home    Distant Location (provider location):  Off-site  Platform used for Video Visit: [email or web address]

## 2023-06-16 ENCOUNTER — TELEPHONE (OUTPATIENT)
Dept: FAMILY MEDICINE | Facility: CLINIC | Age: 58
End: 2023-06-16
Payer: COMMERCIAL

## 2023-08-09 ENCOUNTER — TELEPHONE (OUTPATIENT)
Dept: CARDIOLOGY | Facility: CLINIC | Age: 58
End: 2023-08-09
Payer: COMMERCIAL

## 2023-08-09 DIAGNOSIS — I25.10 CORONARY ARTERY DISEASE INVOLVING NATIVE CORONARY ARTERY OF NATIVE HEART WITHOUT ANGINA PECTORIS: ICD-10-CM

## 2023-08-09 NOTE — LETTER
August 10, 2023       TO: Alison Barfield  [address] [address] [facility] [address]       Dear Alison Barfield,    You have requested a medication refill and our records indicate that you have not been seen by one of our providers for your annual office visit.  We will refill your medication for 3 months, which will allow you enough time to be seen by one of our providers.    Please call our clinic at [phone] to schedule your appointment as soon as possible.    Thank you,    [facility]

## 2023-08-10 RX ORDER — ROSUVASTATIN CALCIUM 20 MG/1
20 TABLET, COATED ORAL DAILY
Qty: 90 TABLET | Refills: 0 | Status: SHIPPED | OUTPATIENT
Start: 2023-08-10 | End: 2023-12-12

## 2023-08-10 NOTE — TELEPHONE ENCOUNTER
Attempted to call patient to discuss refill of rosuvastatin and cardiology follow-up, left message for patient to call back.  KARTHIKEYAN Bolton RN

## 2023-08-10 NOTE — TELEPHONE ENCOUNTER
[facility] Cardiology Refill Guideline reviewed.  Medication does not meet criteria for refill due to overdue for follow up.  Messaged to providers team for further review.  [place] AREVALO

## 2023-08-11 ENCOUNTER — TELEPHONE (OUTPATIENT)
Dept: FAMILY MEDICINE | Facility: CLINIC | Age: 58
End: 2023-08-11

## 2023-08-11 ENCOUNTER — LAB (OUTPATIENT)
Dept: FAMILY MEDICINE | Facility: CLINIC | Age: 58
End: 2023-08-11

## 2023-08-11 ENCOUNTER — ANCILLARY PROCEDURE (OUTPATIENT)
Dept: GENERAL RADIOLOGY | Facility: CLINIC | Age: 58
End: 2023-08-11
Attending: PHYSICIAN ASSISTANT
Payer: COMMERCIAL

## 2023-08-11 ENCOUNTER — OFFICE VISIT (OUTPATIENT)
Dept: FAMILY MEDICINE | Facility: CLINIC | Age: 58
End: 2023-08-11
Payer: COMMERCIAL

## 2023-08-11 VITALS
RESPIRATION RATE: 16 BRPM | SYSTOLIC BLOOD PRESSURE: 124 MMHG | HEIGHT: 65 IN | OXYGEN SATURATION: 98 % | WEIGHT: 222.4 LBS | TEMPERATURE: 98.1 F | HEART RATE: 75 BPM | DIASTOLIC BLOOD PRESSURE: 78 MMHG | BODY MASS INDEX: 37.05 KG/M2

## 2023-08-11 DIAGNOSIS — M77.31 HEEL SPUR, RIGHT: ICD-10-CM

## 2023-08-11 DIAGNOSIS — Z12.11 SCREEN FOR COLON CANCER: ICD-10-CM

## 2023-08-11 DIAGNOSIS — M72.2 PLANTAR FASCIITIS: ICD-10-CM

## 2023-08-11 DIAGNOSIS — M79.671 PAIN OF RIGHT HEEL: Primary | ICD-10-CM

## 2023-08-11 DIAGNOSIS — J20.9 ACUTE BRONCHITIS, UNSPECIFIED ORGANISM: ICD-10-CM

## 2023-08-11 DIAGNOSIS — T78.40XD ALLERGIC SYMPTOMS, SUBSEQUENT ENCOUNTER: ICD-10-CM

## 2023-08-11 PROCEDURE — 73630 X-RAY EXAM OF FOOT: CPT | Mod: TC | Performed by: RADIOLOGY

## 2023-08-11 PROCEDURE — 99214 OFFICE O/P EST MOD 30 MIN: CPT | Performed by: PHYSICIAN ASSISTANT

## 2023-08-11 RX ORDER — INDOMETHACIN 25 MG/1
25 CAPSULE ORAL 2 TIMES DAILY WITH MEALS
Qty: 10 CAPSULE | Refills: 0 | Status: SHIPPED | OUTPATIENT
Start: 2023-08-11

## 2023-08-11 RX ORDER — ALBUTEROL SULFATE 90 UG/1
2 AEROSOL, METERED RESPIRATORY (INHALATION) EVERY 4 HOURS PRN
Qty: 18 G | Refills: 3 | Status: SHIPPED | OUTPATIENT
Start: 2023-08-11

## 2023-08-11 RX ORDER — FLUTICASONE PROPIONATE 50 MCG
1 SPRAY, SUSPENSION (ML) NASAL DAILY
Qty: 16 G | Refills: 1 | Status: SHIPPED | OUTPATIENT
Start: 2023-08-11 | End: 2024-01-08

## 2023-08-11 ASSESSMENT — PATIENT HEALTH QUESTIONNAIRE - PHQ9
SUM OF ALL RESPONSES TO PHQ QUESTIONS 1-9: 6
10. IF YOU CHECKED OFF ANY PROBLEMS, HOW DIFFICULT HAVE THESE PROBLEMS MADE IT FOR YOU TO DO YOUR WORK, TAKE CARE OF THINGS AT HOME, OR GET ALONG WITH OTHER PEOPLE: SOMEWHAT DIFFICULT
SUM OF ALL RESPONSES TO PHQ QUESTIONS 1-9: 6

## 2023-08-11 ASSESSMENT — ANXIETY QUESTIONNAIRES
5. BEING SO RESTLESS THAT IT IS HARD TO SIT STILL: SEVERAL DAYS
IF YOU CHECKED OFF ANY PROBLEMS ON THIS QUESTIONNAIRE, HOW DIFFICULT HAVE THESE PROBLEMS MADE IT FOR YOU TO DO YOUR WORK, TAKE CARE OF THINGS AT HOME, OR GET ALONG WITH OTHER PEOPLE: SOMEWHAT DIFFICULT
6. BECOMING EASILY ANNOYED OR IRRITABLE: SEVERAL DAYS
7. FEELING AFRAID AS IF SOMETHING AWFUL MIGHT HAPPEN: NOT AT ALL
GAD7 TOTAL SCORE: 6
GAD7 TOTAL SCORE: 6
2. NOT BEING ABLE TO STOP OR CONTROL WORRYING: SEVERAL DAYS
1. FEELING NERVOUS, ANXIOUS, OR ON EDGE: SEVERAL DAYS
3. WORRYING TOO MUCH ABOUT DIFFERENT THINGS: SEVERAL DAYS
4. TROUBLE RELAXING: SEVERAL DAYS

## 2023-08-11 NOTE — PROGRESS NOTES
Assessment & Plan     Pain of right heel  Likely related to plantar heel spur and plantar fasciitis. Discussed ways to improve pain including exercises at home. We will try a short burst of indomethacin as antiinflammatory. PT and orthopedics referrals placed.  - XR Foot Right G/E 3 Views  - Physical Therapy Referral; Future  - Orthopedic  Referral; Future  - indomethacin (INDOCIN) 25 MG capsule; Take 1 capsule (25 mg) by mouth 2 times daily (with meals)  - Ankle/Foot Bracing Supplies DME Heel Cup; Bilateral    Heel spur, right    - Physical Therapy Referral; Future  - Orthopedic  Referral; Future  - indomethacin (INDOCIN) 25 MG capsule; Take 1 capsule (25 mg) by mouth 2 times daily (with meals)  - Ankle/Foot Bracing Supplies DME Heel Cup; Bilateral    Plantar fasciitis    - Physical Therapy Referral; Future  - Orthopedic  Referral; Future  - indomethacin (INDOCIN) 25 MG capsule; Take 1 capsule (25 mg) by mouth 2 times daily (with meals)  - Ankle/Foot Bracing Supplies DME Heel Cup; Bilateral    Screen for colon cancer  ColoGuard reordered.  - COLOGUARD([employer]); Future    Acute bronchitis, unspecified organism  Refilled for patient. Usually has worsening of pulmonary symptoms during Fall and Spring.  - albuterol (PROAIR HFA/PROVENTIL HFA/VENTOLIN HFA) 108 (90 Base) MCG/ACT inhaler; Inhale 2 puffs into the lungs every 4 hours as needed for shortness of breath or wheezing    Allergic symptoms, subsequent encounter  Refilled for patient.  - fluticasone (FLONASE) 50 MCG/ACT nasal spray; Spray 1 spray into both nostrils daily    Review of the result(s) of each unique test - x-ray right foot  Ordering of each unique test  Prescription drug management      Laurie Madrid PA-C  [facility] DELMI Rosas is a 57 year old, presenting for the following health issues:  Musculoskeletal Problem (C/O right heel pain X 3-4 weeks, shooting pain at night)       "8/11/2023     1:29 PM   Additional Questions   Roomed by Lyn MOORE CMA       History of Present Illness       Reason for visit:  Heel of foot very painful  Symptom onset:  3-4 weeks ago  Symptoms include:  Sharp (jolting pain starting to get worse at night) and constant pains  Symptom intensity:  Severe  Symptom progression:  Worsening  Had these symptoms before:  Yes  Has tried/received treatment for these symptoms:  No  What makes it worse:  Standing and walking most of the time  What makes it better:  Soaking in ebsom salts and salon pas before bed, [place]    She eats 2-3 servings of fruits and vegetables daily.She consumes 1 sweetened beverage(s) daily.She exercises with enough effort to increase her heart rate 20 to 29 minutes per day.  She exercises with enough effort to increase her heart rate 4 days per week. She is missing 1 dose(s) of medications per week.  She is not taking prescribed medications regularly due to remembering to take.       Pain History:  When did you first notice your pain? 4 weeks ago   Have you seen anyone else for your pain? No  How has your pain affected your ability to work? 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  Right heel    She reports having some intermittent pain in the right heel several months ago but it improved with changing shoes (more arch support). It has been gradually worsening of the past month and now causing jolting pain at night that \"moves\" her whole leg.    Review of Systems   GENERAL:  No fevers  MUSCULOSKELETAL: As noted in HPI          Objective    /78 (BP Location: Right arm, Patient Position: Sitting, Cuff Size: Adult Large)   Pulse 75   Temp 98.1  F (36.7  C) (Oral)   Resp 16   Ht 1.651 m (5' 5\")   Wt 100.9 kg (222 lb 6.4 oz)   LMP 09/09/2010   SpO2 98%   BMI 37.01 kg/m    Body mass index is 37.01 kg/m .  Physical Exam   GENERAL: No acute " distress  HEENT: Normocephalic  VASCULAR: 2+ DP and PT pulses on the right  EXTREMITIES:   Right lower extremity: No obvious deformity or ecchymosis. Tenderness and swelling near the distal heel as it comes into the arch of the plantar foot.   Left lower extremity: No obvious deformity, no edema or ecchymosis.  NEURO: Alert, non-focal      Results for orders placed or performed in visit on 08/11/23   XR Foot Right G/E 3 Views    Impression    IMPRESSION: No acute osseous abnormality demonstrated.    IGOR MORENO MD         SYSTEM ID:  [id number]

## 2023-08-11 NOTE — TELEPHONE ENCOUNTER
----- Message from Laurie Madrid PA-C sent at 8/11/2023  3:07 PM CDT -----  Please call patient and let her know x-ray shows the heel spur we discussed. Otherwise normal (no significant arthritis).

## 2023-08-11 NOTE — LETTER
August 14, 2023      Alison Barfield  [address] [facility] [address]        Dear ,    We tried to reach you by phone but were unsuccessful.        ----- Message from Laurie Madrid PA-C sent at 8/11/2023  3:07 PM CDT -----  Please call patient and let her know x-ray shows the heel spur we discussed. Otherwise normal (no significant arthritis).     Resulted Orders   XR Foot Right G/E 3 Views    Narrative    FOOT THREE VIEWS RIGHT  8/11/2023 1:58 PM     HISTORY: Right heel pain and arch pain, no trauma  COMPARISON: None.    FINDINGS: There is no significant degenerative change. The Lisfranc  joint appears unremarkable in these views. The plantar arch is  unremarkable in these views. There is no acute fracture or  dislocation. Moderate plantar and small Achilles calcaneal  enthesophytes. Accessory navicular.      Impression    IMPRESSION: No acute osseous abnormality demonstrated.    IGOR MORENO MD         SYSTEM ID:  [id number]       If you have any questions or concerns, please call the clinic at the number listed above.       Sincerely,      Claudette Peres RN

## 2023-10-03 ENCOUNTER — PATIENT OUTREACH (OUTPATIENT)
Dept: FAMILY MEDICINE | Facility: CLINIC | Age: 58
End: 2023-10-03
Payer: COMMERCIAL

## 2023-10-03 ENCOUNTER — PATIENT OUTREACH (OUTPATIENT)
Dept: CARE COORDINATION | Facility: CLINIC | Age: 58
End: 2023-10-03
Payer: COMMERCIAL

## 2023-10-03 LAB — SCANNED LAB RESULT: NORMAL

## 2023-10-03 NOTE — TELEPHONE ENCOUNTER
.Patient Quality Outreach    Patient is due for the following:   Colon Cancer Screening    Next Steps:   Schedule a office visit for colonoscopy    Type of outreach:    Sent letter.    Next Steps:  Reach out within 90 days via Letter.    Max number of attempts reached: No. Will try again in 90 days if patient still on fail list.    Questions for provider review:    None           Lilibeth Mclaughlin CMA

## 2023-10-03 NOTE — LETTER
[facility]  [address] [facility] [address] [phone]  October 3, 2023    Alison Barfield  [address] [facility] [address]      Hello-     I care about your health and have reviewed your health plan. I have reviewed your medical conditions, medication list, and lab results and am making recommendations based on this review, to better manage your health.     You are in particular need of attention regarding:  -Breast Cancer Screening  -Colon Cancer Screening  -Wellness (Physical) Visit      I am recommending that you:  :-schedule a WELLNESS (Physical) APPOINTMENT with me.   I will check fasting labs the same day - nothing to eat except water and meds for 8-10 hours prior. (If you go elsewhere for Wellness visits then please disregard this reminder.)  -schedule a COLONOSCOPY to look for colon cancer (due every 10 years or 5 years in higher risk situations.)   Colon cancer is now the second leading cause of death in the [place] for both men and women and there are over 130,000 new cases and 50,000 deaths per year from colon cancer.  Colonoscopies can prevent 90-95% of these deaths.  Problem lesions can be removed before they ever become cancer.  This test is not only looking for cancer, but also getting rid of precancerious lesions.  If you do not wish to do a colonoscopy or cannot afford to do one, at this time, there is another option. It is called a FIT test or Fecal Immunochemical Occult Blood Test (take home stool sample kit).  It does not replace the colonoscopy for colorectal cancer screening, but it can detect hidden bleeding in the lower colon.  It does need to be repeated every year and if a positive result is obtained, you would be referred for a colonoscopy.  If you have completed either one of these tests at another facility, please have the records sent to our clinic so that we can best coordinate your care.  -Or if  you could return the ColoGuard test that was ordered 8/31/22. If you need a replacement please contact our office.      Please call us at [phone] (or use [employer]) to address the above recommendations.      Thank you for trusting us with your health care.We look forward to supporting your healthcare needs in the future.     Sincerely,       Here is a list of Health Maintenance topics that are due now or due soon:  Health Maintenance Due   Topic Date Due    HEPATITIS B IMMUNIZATION (1 of 3 - 3-dose series) Never done    COLORECTAL CANCER SCREENING  Never done    ZOSTER IMMUNIZATION (1 of 2) Never done    BMP  05/02/2023    INFLUENZA VACCINE (1) 09/01/2023    COVID-19 Vaccine (3 - 2023-24 season) 09/01/2023    ANNUAL REVIEW OF HM ORDERS  10/13/2023    YEARLY PREVENTIVE VISIT  11/02/2023    LIPID  11/02/2023       Please call us at [phone] (or use [employer]) to address the above recommendations.     Thank you for trusting [facility] and we appreciate the opportunity to serve you.  We look forward to supporting your healthcare needs in the future.    Healthy Regards,    April Coming-MD [place]

## 2023-10-17 ENCOUNTER — PATIENT OUTREACH (OUTPATIENT)
Dept: CARE COORDINATION | Facility: CLINIC | Age: 58
End: 2023-10-17
Payer: COMMERCIAL

## 2023-11-06 DIAGNOSIS — I10 ESSENTIAL HYPERTENSION WITH GOAL BLOOD PRESSURE LESS THAN 140/90: ICD-10-CM

## 2023-11-06 RX ORDER — LOSARTAN POTASSIUM AND HYDROCHLOROTHIAZIDE 25; 100 MG/1; MG/1
1 TABLET ORAL DAILY
Qty: 90 TABLET | Refills: 0 | Status: SHIPPED | OUTPATIENT
Start: 2023-11-06 | End: 2023-12-12

## 2023-11-06 NOTE — TELEPHONE ENCOUNTER
Medication is being filled for 1 time refill only due to:  Patient needs labs Cr, K, Na.  Shanae Serrano RN, BSN  [facility]

## 2023-11-11 DIAGNOSIS — F34.1 DYSTHYMIA: ICD-10-CM

## 2023-11-11 NOTE — LETTER
November 13, 2023      Alison Barfield  [address] [address] [address]      Dear Alison,    We recently received a call from your pharmacy requesting a refill of your medication.    A review of your chart indicates that an appointment is required with your provider.  Please call the clinic to schedule your appointment.    We have authorized one refill of your medication to allow time for you to schedule.   If you have a history of diabetes or high cholesterol, please come in fasting for the appointment. Fasting entails nothing to eat or drink 8 hours prior to your appointment; with the exception on water. You may take your medication the day of the appointment.    Thank you,      Susan Chaves PA-C

## 2023-11-13 RX ORDER — BUPROPION HYDROCHLORIDE 300 MG/1
300 TABLET ORAL EVERY MORNING
Qty: 90 TABLET | Refills: 0 | Status: SHIPPED | OUTPATIENT
Start: 2023-11-13 | End: 2023-12-12

## 2023-11-13 NOTE — TELEPHONE ENCOUNTER
Due for visit. Refill provided. Please help patient get a visit scheduled to review her mood and how medications are working.

## 2023-11-14 ENCOUNTER — TELEPHONE (OUTPATIENT)
Dept: CARDIOLOGY | Facility: CLINIC | Age: 58
End: 2023-11-14
Payer: COMMERCIAL

## 2023-11-17 DIAGNOSIS — I10 ESSENTIAL HYPERTENSION WITH GOAL BLOOD PRESSURE LESS THAN 140/90: ICD-10-CM

## 2023-11-17 RX ORDER — METOPROLOL SUCCINATE 25 MG/1
TABLET, EXTENDED RELEASE ORAL
Qty: 90 TABLET | Refills: 0 | Status: SHIPPED | OUTPATIENT
Start: 2023-11-17 | End: 2023-12-12

## 2023-11-17 NOTE — TELEPHONE ENCOUNTER
Please call patient. The medication has been refilled. They are due for a follow-up visit for BP, cholesterol and overall physical.

## 2023-11-17 NOTE — LETTER
November 17, 2023      Alison Barfield  [address] [facility] [address]      Dear Alison,    We recently received a call from your pharmacy requesting a refill of your medication.    A review of your chart indicates that an appointment is required with your provider.  Please call the clinic to schedule your appointment for a physical, blood pressure and cholesterol check.    We have authorized one refill of your medication to allow time for you to schedule.   If you have a history of diabetes or high cholesterol, please come in fasting for the appointment. Fasting entails nothing to eat or drink 8 hours prior to your appointment; with the exception on water. You may take your medication the day of the appointment.    Thank you,      Susan Chaves PA-C

## 2023-12-12 ENCOUNTER — VIRTUAL VISIT (OUTPATIENT)
Dept: FAMILY MEDICINE | Facility: CLINIC | Age: 58
End: 2023-12-12
Payer: COMMERCIAL

## 2023-12-12 DIAGNOSIS — I10 ESSENTIAL HYPERTENSION WITH GOAL BLOOD PRESSURE LESS THAN 140/90: Primary | ICD-10-CM

## 2023-12-12 DIAGNOSIS — C50.911 MALIGNANT NEOPLASM OF RIGHT FEMALE BREAST, UNSPECIFIED ESTROGEN RECEPTOR STATUS, UNSPECIFIED SITE OF BREAST (H): ICD-10-CM

## 2023-12-12 DIAGNOSIS — F34.1 DYSTHYMIA: ICD-10-CM

## 2023-12-12 DIAGNOSIS — Z90.13 HISTORY OF BILATERAL MASTECTOMY: ICD-10-CM

## 2023-12-12 PROCEDURE — 99214 OFFICE O/P EST MOD 30 MIN: CPT | Mod: VID | Performed by: PHYSICIAN ASSISTANT

## 2023-12-12 RX ORDER — METOPROLOL SUCCINATE 25 MG/1
25 TABLET, EXTENDED RELEASE ORAL DAILY
Qty: 90 TABLET | Refills: 1 | Status: SHIPPED | OUTPATIENT
Start: 2023-12-12 | End: 2024-02-23

## 2023-12-12 RX ORDER — BUPROPION HYDROCHLORIDE 300 MG/1
300 TABLET ORAL EVERY MORNING
Qty: 90 TABLET | Refills: 1 | Status: SHIPPED | OUTPATIENT
Start: 2023-12-12 | End: 2024-02-23

## 2023-12-12 RX ORDER — ROSUVASTATIN CALCIUM 20 MG/1
20 TABLET, COATED ORAL DAILY
Qty: 90 TABLET | Refills: 0 | Status: SHIPPED | OUTPATIENT
Start: 2023-12-12 | End: 2024-02-23

## 2023-12-12 RX ORDER — LOSARTAN POTASSIUM AND HYDROCHLOROTHIAZIDE 25; 100 MG/1; MG/1
1 TABLET ORAL DAILY
Qty: 90 TABLET | Refills: 1 | Status: SHIPPED | OUTPATIENT
Start: 2023-12-12 | End: 2024-02-23

## 2023-12-12 ASSESSMENT — PATIENT HEALTH QUESTIONNAIRE - PHQ9
SUM OF ALL RESPONSES TO PHQ QUESTIONS 1-9: 3
10. IF YOU CHECKED OFF ANY PROBLEMS, HOW DIFFICULT HAVE THESE PROBLEMS MADE IT FOR YOU TO DO YOUR WORK, TAKE CARE OF THINGS AT HOME, OR GET ALONG WITH OTHER PEOPLE: NOT DIFFICULT AT ALL
SUM OF ALL RESPONSES TO PHQ QUESTIONS 1-9: 3

## 2023-12-12 ASSESSMENT — ANXIETY QUESTIONNAIRES
IF YOU CHECKED OFF ANY PROBLEMS ON THIS QUESTIONNAIRE, HOW DIFFICULT HAVE THESE PROBLEMS MADE IT FOR YOU TO DO YOUR WORK, TAKE CARE OF THINGS AT HOME, OR GET ALONG WITH OTHER PEOPLE: SOMEWHAT DIFFICULT
6. BECOMING EASILY ANNOYED OR IRRITABLE: SEVERAL DAYS
3. WORRYING TOO MUCH ABOUT DIFFERENT THINGS: SEVERAL DAYS
2. NOT BEING ABLE TO STOP OR CONTROL WORRYING: SEVERAL DAYS
5. BEING SO RESTLESS THAT IT IS HARD TO SIT STILL: NOT AT ALL
GAD7 TOTAL SCORE: 5
4. TROUBLE RELAXING: NOT AT ALL
7. FEELING AFRAID AS IF SOMETHING AWFUL MIGHT HAPPEN: SEVERAL DAYS
GAD7 TOTAL SCORE: 5
1. FEELING NERVOUS, ANXIOUS, OR ON EDGE: SEVERAL DAYS

## 2023-12-12 NOTE — PROGRESS NOTES
"Alison is a 58 year old who is being evaluated via a billable video visit.      How would you like to obtain your AVS? MyChart  If the video visit is dropped, the invitation should be resent by: Text to cell phone: [phone]  Will anyone else be joining your video visit? No          Assessment & Plan     Essential hypertension with goal blood pressure less than 140/90  Stable with meds  - losartan-hydrochlorothiazide (HYZAAR) 100-25 MG tablet; Take 1 tablet by mouth daily  - metoprolol succinate ER (TOPROL XL) 25 MG 24 hr tablet; Take 1 tablet (25 mg) by mouth daily    Dysthymia  Symptoms tolerable   - buPROPion (WELLBUTRIN XL) 300 MG 24 hr tablet; Take 1 tablet (300 mg) by mouth every morning    Malignant neoplasm of right female breast, unspecified estrogen receptor status, unspecified site of breast (H)  Sees Dr. Cedeño . Await imaging. Will follow-up with plastic surgery  - MR Breast Bilateral w/o & w Contrast; Future    History of bilateral mastectomy  Await imaging.   - MR Breast Bilateral w/o & w Contrast; Future             BMI:   Estimated body mass index is 37.01 kg/m  as calculated from the following:    Height as of 8/11/23: 1.651 m (5' 5\").    Weight as of 8/11/23: 100.9 kg (222 lb 6.4 oz).           Susan Chaves PA-C  [facility]    Harley Rosas is a 58 year old, presenting for the following health issues:  Recheck Medication      12/12/2023     3:02 PM   Additional Questions   Roomed by Lianne DEVINE     Hypertension Follow-up    Do you check your blood pressure regularly outside of the clinic? Yes   Are you following a low salt diet? No  Are your blood pressures ever more than 140 on the top number (systolic) OR more   than 90 on the bottom number (diastolic), for example 140/90? No    Depression Followup  How are you doing with your depression since your last visit? Improved   Are you having other symptoms that might be associated with depression? No  Have " you had a significant life event?  No   Are you feeling anxious or having panic attacks?   No  Do you have any concerns with your use of alcohol or other drugs? No    Social History     Tobacco Use    Smoking status: Former     Types: Cigarettes     Quit date:      Years since quittin.9    Smokeless tobacco: Never    Tobacco comments:     Quit in    Vaping Use    Vaping Use: Never used   Substance Use Topics    Alcohol use: No     Comment: none    Drug use: No         1/3/2023     2:05 PM 2023     1:18 PM 2023     2:58 PM   PHQ   PHQ-9 Total Score 7 6 3   Q9: Thoughts of better off dead/self-harm past 2 weeks Not at all Not at all Not at all         1/3/2023     2:05 PM 2023     1:18 PM 2023     2:59 PM   EDGAR-7 SCORE   Total Score  6 (mild anxiety) 5 (mild anxiety)   Total Score 6 6 5       Patient has h/o BCA with bilateral mastectomy and silicone implants.  Patient has noticed some increased drooping of right breast.  Had MR of breasts a year and a half ago ordered by plastic surgery.  Wondering if PCP can repeat imaging given changes and then follow-up with plastics.       Review of Systems   Constitutional, HEENT, cardiovascular, pulmonary, gi and gu systems are negative, except as otherwise noted.      Objective           Vitals:  No vitals were obtained today due to virtual visit.    Physical Exam   GENERAL: Healthy, alert and no distress  EYES: Eyes grossly normal to inspection.  No discharge or erythema, or obvious scleral/conjunctival abnormalities.  RESP: No audible wheeze, cough, or visible cyanosis.  No visible retractions or increased work of breathing.    SKIN: Visible skin clear. No significant rash, abnormal pigmentation or lesions.  NEURO: Cranial nerves grossly intact.  Mentation and speech appropriate for age.  PSYCH: Mentation appears normal, affect normal/bright, judgement and insight intact, normal speech and appearance well-groomed.                Video-Visit  Details    Type of service:  Video Visit     Originating Location (pt. Location): Home    Distant Location (provider location):  Off-site  Platform used for Video Visit: ChanelleEnersave      Answers submitted by the patient for this visit:  Patient Health Questionnaire (Submitted on 12/12/2023)  If you checked off any problems, how difficult have these problems made it for you to do your work, take care of things at home, or get along with other people?: Not difficult at all  PHQ9 TOTAL SCORE: 3  EDGAR-7 (Submitted on 12/12/2023)  EDGAR 7 TOTAL SCORE: 5

## 2024-01-08 DIAGNOSIS — T78.40XD ALLERGIC SYMPTOMS, SUBSEQUENT ENCOUNTER: ICD-10-CM

## 2024-01-08 RX ORDER — FLUTICASONE PROPIONATE 50 MCG
1 SPRAY, SUSPENSION (ML) NASAL DAILY
Qty: 16 G | Refills: 1 | Status: SHIPPED | OUTPATIENT
Start: 2024-01-08

## 2024-01-13 ENCOUNTER — HEALTH MAINTENANCE LETTER (OUTPATIENT)
Age: 59
End: 2024-01-13

## 2024-01-29 ENCOUNTER — OFFICE VISIT (OUTPATIENT)
Dept: PODIATRY | Facility: CLINIC | Age: 59
End: 2024-01-29
Attending: PHYSICIAN ASSISTANT
Payer: COMMERCIAL

## 2024-01-29 VITALS — SYSTOLIC BLOOD PRESSURE: 144 MMHG | DIASTOLIC BLOOD PRESSURE: 85 MMHG | WEIGHT: 222 LBS | BODY MASS INDEX: 36.94 KG/M2

## 2024-01-29 DIAGNOSIS — M72.2 PLANTAR FASCIITIS: ICD-10-CM

## 2024-01-29 DIAGNOSIS — M79.671 PAIN OF RIGHT HEEL: ICD-10-CM

## 2024-01-29 DIAGNOSIS — M77.31 HEEL SPUR, RIGHT: ICD-10-CM

## 2024-01-29 PROCEDURE — 99203 OFFICE O/P NEW LOW 30 MIN: CPT | Mod: 25 | Performed by: PODIATRIST

## 2024-01-29 PROCEDURE — 20550 NJX 1 TENDON SHEATH/LIGAMENT: CPT | Mod: RT | Performed by: PODIATRIST

## 2024-01-29 RX ADMIN — BUPIVACAINE HYDROCHLORIDE 0.5 ML: 5 INJECTION, SOLUTION PERINEURAL at 14:11

## 2024-01-29 RX ADMIN — TRIAMCINOLONE ACETONIDE 40 MG: 40 INJECTION, SUSPENSION INTRA-ARTICULAR; INTRAMUSCULAR at 14:11

## 2024-01-29 NOTE — LETTER
"    1/29/2024         RE: Alison Barfield  [address] [facility] [id number]        Dear Colleague,    Thank you for referring your patient, Alison Barfield, to the [facility]. Please see a copy of my visit note below.    Foot & Ankle Surgery  January 29, 2024    CC: \"Bone spur on the heel of right foot\"    I was asked to see Alison Barfield regarding the chief complaint by:  KARTHIKEYAN Madrid PA-C    HPI:  Pt is a 58 year old female who presents with above complaint.  2-month history of right lower extremity aching pain.  \"Constant pain and discomfort, very difficult to walk or put pressure on it.  \"Pain 8 out of 10 \"constant, every day\".  This is exacerbated by \"walking and standing\".  \"Physical therapy stretches, pain relieving pads, heel inserts, shoes for plantar faciatis, ibuprophen, ice for swelling\" for treatment.  X-rays of the right foot from 8/11/2023 demonstrated a plantar calcaneal spur and a smaller posterior calcaneal spur    ROS:   Pos for CC.  The patient denies current nausea, vomiting, chills, fevers, belly pain, calf pain, chest pain or SOB.  Complete remainder of ROS is otherwise neg.    VITALS:    Vitals:    01/29/24 1344   Weight: 100.7 kg (222 lb)       PMH:    Past Medical History:   Diagnosis Date     Allergic rhinitis, cause unspecified      Anxiety     pt requests relaxant prior to surgery     BENIGN HYPERTENSION 11/2/2004     Calculus of kidney      Elevated rheumatoid factor 12/2/2011     HTN, goal below 140/90 6/13/2013     Infectious mononucleosis      Malignant neoplasm (H)      Migraine 2/20/2012       SXHX:    Past Surgical History:   Procedure Laterality Date     BREAST SURGERY      masectomy-double     EXCISE MASS NECK  11/21/2012    Procedure: EXCISE MASS NECK;  Excision of Left Deep Neck Cyst;  Surgeon: Elmer Hernandez MD;  Location: [facility] OR     HYSTERECTOMY, PAP NO LONGER INDICATED       kidney stone extraction      1993 and 1994     " LAPAROSCOPIC ASSISTED HYSTERECTOMY VAGINAL, BILATERAL SALPINGO-OOPHORECTOMY, COMBINED      hysterectomy - 2010 - [facility]     MASTECTOMY, BILATERAL       ZZC NONSPECIFIC PROCEDURE      Child birth x 3        MEDS:    Current Outpatient Medications   Medication     Acetaminophen (TYLENOL PO)     albuterol (PROAIR HFA/PROVENTIL HFA/VENTOLIN HFA) 108 (90 Base) MCG/ACT inhaler     aspirin (ASA) 81 MG EC tablet     buPROPion (WELLBUTRIN XL) 300 MG 24 hr tablet     fluconazole (DIFLUCAN) 150 MG tablet     fluticasone (FLONASE) 50 MCG/ACT nasal spray     indomethacin (INDOCIN) 25 MG capsule     losartan-hydrochlorothiazide (HYZAAR) 100-25 MG tablet     metoprolol succinate ER (TOPROL XL) 25 MG 24 hr tablet     rosuvastatin (CRESTOR) 20 MG tablet     SUMAtriptan (IMITREX) 100 MG tablet     No current facility-administered medications for this visit.       ALL:     Allergies   Allergen Reactions     Cefprozil      Cefzil rash and facial swelling     Erythromycin      GI upset     Sulfa Antibiotics        FMH:    Family History   Problem Relation Age of Onset     Cancer Father         lung CA     Other - See Comments Father          from blood clot     Cancer Brother         squamous cell of the jaw     Hypertension Maternal Grandmother      Cerebrovascular Disease Maternal Grandmother        SocHx:    Social History     Socioeconomic History     Marital status:      Spouse name: Kurt     Number of children: 3     Years of education: Not on file     Highest education level: Not on file   Occupational History     Occupation: clerical     Employer: NONE    Tobacco Use     Smoking status: Former     Types: Cigarettes     Quit date:      Years since quittin.0     Smokeless tobacco: Never     Tobacco comments:     Quit in    Vaping Use     Vaping Use: Never used   Substance and Sexual Activity     Alcohol use: No     Comment: none     Drug use: No     Sexual activity: Yes     Partners: Male      Birth control/protection: Surgical   Other Topics Concern     Parent/sibling w/ CABG, MI or angioplasty before 65F 55M? No   Social History Narrative     Not on file     Social Determinants of Health     Financial Resource Strain: Medium Risk (11/2/2022)    Overall Financial Resource Strain (CARDIA)      Difficulty of Paying Living Expenses: Somewhat hard   Food Insecurity: Food Insecurity Present (11/2/2022)    Hunger Vital Sign      Worried About Running Out of Food in the Last Year: Sometimes true      Ran Out of Food in the Last Year: Never true   Transportation Needs: No Transportation Needs (11/2/2022)    PRAPARE - Transportation      Lack of Transportation (Medical): No      Lack of Transportation (Non-Medical): No   Physical Activity: Insufficiently Active (11/2/2022)    Exercise Vital Sign      Days of Exercise per Week: 3 days      Minutes of Exercise per Session: 30 min   Stress: Stress Concern Present (11/2/2022)    [employer] [place] [employer] - Occupational Stress Questionnaire      Feeling of Stress : To some extent   Social Connections: Moderately Integrated (11/2/2022)    Social Connection and Isolation Panel [NHANES]      Frequency of Communication with Friends and Family: More than three times a week      Frequency of Social Gatherings with Friends and Family: Twice a week      Attends Sabianist Services: 1 to 4 times per year      Active Member of Clubs or Organizations: No      Attends Club or Organization Meetings: Not on file      Marital Status:    Interpersonal Safety: Not on file   Housing Stability: High Risk (11/2/2022)    Housing Stability Vital Sign      Unable to Pay for Housing in the Last Year: Yes      Number of Places Lived in the Last Year: 1      Unstable Housing in the Last Year: No           EXAMINATION:  Gen:   No apparent distress  Neuro:   A&Ox3, no deficits  Psych:    Answering questions appropriately for age and situation with normal affect  Head:     NCAT  Eye:    Visual scanning without deficit  Ear:    Response to auditory stimuli wnl  Lung:    Non-labored breathing on RA noted  Abd:    NTND per patient report  Lymph:    Neg for pitting/non-pitting edema BLE  Vasc:    Pulses palpable, CFT minimally delayed  Neuro:    Light touch sensation intact to all sensory nerve distributions without paresthesias  Derm:    Neg for nodules, lesions or ulcerations  MSK:    right lower extremity - plantar and mild plantar-lateral heel pain.  Otherwise neg  Calf:    Neg for redness, swelling or tenderness    Assessment:  58 year old female with right lower extremity plantar fasciitis      Medical Decision Making/Plan:  Discussed etiologies, anatomy and options  1.  Right lower extremity plantar fasciitis   -I personally reviewed and interpreted the patient's lower extremity history pertinent to today's visit, including imaging/labs, in preparation for initiating a treatment program.  -Regarding the heel pain, the Plantar Fasciitis handout was dispensed and discussed.  We talked about stretching, resting/activity modification, icing, NSAID/tylenol use as tolerated, inserts, supportive/comfortable shoes and minimizing shoeless walking.    -discussed Achilles, plantar fascial and hamstring stretches  -OTC insert information dispensed and discussed  -We discussed tier 2 options.  Patient elected proceed with a steroid injection, see procedure note for details    Right plantar fascial insertion steroid injection    Date/Time: 1/29/2024 2:11 PM    Performed by: Doc Peterson DPM  Authorized by: Doc Peterson DPM    Needle Size:  25 G  Approach:  Medial  Location:  Ankle  Medications:  40 mg triamcinolone 40 MG/ML; 0.5 mL BUPivacaine 0.5 %   After obtaining written consent, the skin was prepped with alcohol.  The needle was advance to the underlying right plantar fascial insertion, superior to the fat pad, aspiration was done with position change.  1 1/2cc mixture of  2:1 kenalog 40:0.25% marcaine plain was injected.  The patient tolerated the procedure without complication.  Risks that were discussed included possible joint/soft tissue damage, neuritis/numbness, infection, pigment change, steroid flare.             Follow up:  prn or sooner with acute issues      Patient's medical history was reviewed today      Doc Peterson DPM [facility]  Podiatric Foot & Ankle Surgeon  [facility]  [phone]    Disclaimer: This note consists of symbols derived from keyboarding, dictation and/or voice recognition software. As a result, there may be errors in the script that have gone undetected. Please consider this when interpreting information found in this chart.          Again, thank you for allowing me to participate in the care of your patient.        Sincerely,        Doc Peterson DPM, KATHIA

## 2024-01-29 NOTE — PROGRESS NOTES
"Foot & Ankle Surgery  January 29, 2024    CC: \"Bone spur on the heel of right foot\"    I was asked to see Alison Barfield regarding the chief complaint by:  KARTHIKEYAN Madrid PA-C    HPI:  Pt is a 58 year old female who presents with above complaint.  2-month history of right lower extremity aching pain.  \"Constant pain and discomfort, very difficult to walk or put pressure on it.  \"Pain 8 out of 10 \"constant, every day\".  This is exacerbated by \"walking and standing\".  \"Physical therapy stretches, pain relieving pads, heel inserts, shoes for plantar faciatis, ibuprophen, ice for swelling\" for treatment.  X-rays of the right foot from 8/11/2023 demonstrated a plantar calcaneal spur and a smaller posterior calcaneal spur    ROS:   Pos for CC.  The patient denies current nausea, vomiting, chills, fevers, belly pain, calf pain, chest pain or SOB.  Complete remainder of ROS is otherwise neg.    VITALS:    Vitals:    01/29/24 1344   Weight: 100.7 kg (222 lb)       PMH:    Past Medical History:   Diagnosis Date    Allergic rhinitis, cause unspecified     Anxiety     pt requests relaxant prior to surgery    BENIGN HYPERTENSION 11/2/2004    Calculus of kidney     Elevated rheumatoid factor 12/2/2011    HTN, goal below 140/90 6/13/2013    Infectious mononucleosis     Malignant neoplasm (H)     Migraine 2/20/2012       SXHX:    Past Surgical History:   Procedure Laterality Date    BREAST SURGERY      masectomy-double    EXCISE MASS NECK  11/21/2012    Procedure: EXCISE MASS NECK;  Excision of Left Deep Neck Cyst;  Surgeon: Elmer Hernandez MD;  Location: [facility] OR    HYSTERECTOMY, PAP NO LONGER INDICATED      kidney stone extraction      1993 and 1994    LAPAROSCOPIC ASSISTED HYSTERECTOMY VAGINAL, BILATERAL SALPINGO-OOPHORECTOMY, COMBINED      hysterectomy - 11/8/2010 - [facility]    MASTECTOMY, BILATERAL      ZZC NONSPECIFIC PROCEDURE      Child birth x 3        MEDS:    Current Outpatient Medications   Medication    " Acetaminophen (TYLENOL PO)    albuterol (PROAIR HFA/PROVENTIL HFA/VENTOLIN HFA) 108 (90 Base) MCG/ACT inhaler    aspirin (ASA) 81 MG EC tablet    buPROPion (WELLBUTRIN XL) 300 MG 24 hr tablet    fluconazole (DIFLUCAN) 150 MG tablet    fluticasone (FLONASE) 50 MCG/ACT nasal spray    indomethacin (INDOCIN) 25 MG capsule    losartan-hydrochlorothiazide (HYZAAR) 100-25 MG tablet    metoprolol succinate ER (TOPROL XL) 25 MG 24 hr tablet    rosuvastatin (CRESTOR) 20 MG tablet    SUMAtriptan (IMITREX) 100 MG tablet     No current facility-administered medications for this visit.       ALL:     Allergies   Allergen Reactions    Cefprozil      Cefzil rash and facial swelling    Erythromycin      GI upset    Sulfa Antibiotics        FMH:    Family History   Problem Relation Age of Onset    Cancer Father         lung CA    Other - See Comments Father          from blood clot    Cancer Brother         squamous cell of the jaw    Hypertension Maternal Grandmother     Cerebrovascular Disease Maternal Grandmother        SocHx:    Social History     Socioeconomic History    Marital status:      Spouse name: Kurt    Number of children: 3    Years of education: Not on file    Highest education level: Not on file   Occupational History    Occupation: clerical     Employer: NONE    Tobacco Use    Smoking status: Former     Types: Cigarettes     Quit date:      Years since quittin.0    Smokeless tobacco: Never    Tobacco comments:     Quit in    Vaping Use    Vaping Use: Never used   Substance and Sexual Activity    Alcohol use: No     Comment: none    Drug use: No    Sexual activity: Yes     Partners: Male     Birth control/protection: Surgical   Other Topics Concern    Parent/sibling w/ CABG, MI or angioplasty before 65F 55M? No   Social History Narrative    Not on file     Social Determinants of Health     Financial Resource Strain: Medium Risk (2022)    Overall Financial Resource Strain (CARDIA)      Difficulty of Paying Living Expenses: Somewhat hard   Food Insecurity: Food Insecurity Present (11/2/2022)    Hunger Vital Sign     Worried About Running Out of Food in the Last Year: Sometimes true     Ran Out of Food in the Last Year: Never true   Transportation Needs: No Transportation Needs (11/2/2022)    PRAPARE - Transportation     Lack of Transportation (Medical): No     Lack of Transportation (Non-Medical): No   Physical Activity: Insufficiently Active (11/2/2022)    Exercise Vital Sign     Days of Exercise per Week: 3 days     Minutes of Exercise per Session: 30 min   Stress: Stress Concern Present (11/2/2022)    [employer] [employer] - Occupational Stress Questionnaire     Feeling of Stress : To some extent   Social Connections: Moderately Integrated (11/2/2022)    Social Connection and Isolation Panel [NHANES]     Frequency of Communication with Friends and Family: More than three times a week     Frequency of Social Gatherings with Friends and Family: Twice a week     Attends Hoahaoism Services: 1 to 4 times per year     Active Member of Clubs or Organizations: No     Attends Club or Organization Meetings: Not on file     Marital Status:    Interpersonal Safety: Not on file   Housing Stability: High Risk (11/2/2022)    Housing Stability Vital Sign     Unable to Pay for Housing in the Last Year: Yes     Number of Places Lived in the Last Year: 1     Unstable Housing in the Last Year: No           EXAMINATION:  Gen:   No apparent distress  Neuro:   A&Ox3, no deficits  Psych:    Answering questions appropriately for age and situation with normal affect  Head:    NCAT  Eye:    Visual scanning without deficit  Ear:    Response to auditory stimuli wnl  Lung:    Non-labored breathing on RA noted  Abd:    NTND per patient report  Lymph:    Neg for pitting/non-pitting edema BLE  Vasc:    Pulses palpable, CFT minimally delayed  Neuro:    Light touch sensation intact to all sensory nerve  distributions without paresthesias  Derm:    Neg for nodules, lesions or ulcerations  MSK:    right lower extremity - plantar and mild plantar-lateral heel pain.  Otherwise neg  Calf:    Neg for redness, swelling or tenderness    Assessment:  58 year old female with right lower extremity plantar fasciitis      Medical Decision Making/Plan:  Discussed etiologies, anatomy and options  1.  Right lower extremity plantar fasciitis   -I personally reviewed and interpreted the patient's lower extremity history pertinent to today's visit, including imaging/labs, in preparation for initiating a treatment program.  -Regarding the heel pain, the Plantar Fasciitis handout was dispensed and discussed.  We talked about stretching, resting/activity modification, icing, NSAID/tylenol use as tolerated, inserts, supportive/comfortable shoes and minimizing shoeless walking.    -discussed Achilles, plantar fascial and hamstring stretches  -OTC insert information dispensed and discussed  -We discussed tier 2 options.  Patient elected proceed with a steroid injection, see procedure note for details    Right plantar fascial insertion steroid injection    Date/Time: 1/29/2024 2:11 PM    Performed by: Doc Peterson DPM  Authorized by: Doc Peterson DPM    Needle Size:  25 G  Approach:  Medial  Location:  Ankle  Medications:  40 mg triamcinolone 40 MG/ML; 0.5 mL BUPivacaine 0.5 %   After obtaining written consent, the skin was prepped with alcohol.  The needle was advance to the underlying right plantar fascial insertion, superior to the fat pad, aspiration was done with position change.  1 1/2cc mixture of 2:1 kenalog 40:0.25% marcaine plain was injected.  The patient tolerated the procedure without complication.  Risks that were discussed included possible joint/soft tissue damage, neuritis/numbness, infection, pigment change, steroid flare.             Follow up:  prn or sooner with acute issues      Patient's medical  history was reviewed today      Doc Peterson DPM FACFAS FACFAOM  Podiatric Foot & Ankle Surgeon  [facility]  [phone]    Disclaimer: This note consists of symbols derived from keyboarding, dictation and/or voice recognition software. As a result, there may be errors in the script that have gone undetected. Please consider this when interpreting information found in this chart.

## 2024-01-29 NOTE — PATIENT INSTRUCTIONS
Thank you for choosing [facility]!    DR. MINER'S CLINIC LOCATIONS:     [facility] (Friday) TRIAGE LINE: [phone] [address] [facility]  APPOINTMENTS: [phone]   ARCHIE Sullivan [address] RADIOLOGY: [phone]    PHYSICAL THERAPY: [phone]    SET UP SURGERY: [phone]   [place] (Mon-Tues AM-Thurs) BILLING QUESTIONS: [phone]   [address] FAX: [phone]   ARCHIE Brooks [address] [facility]: [phone]      PLANTAR FASCIITIS  Plantar fasciitis is often referred to as heel spurs or heel pain. Plantar fasciitis is a very common problem that affects people of all foot shapes, age, weight and activity level. Pain may be in the arch or on the weight-bearing surface of the heel. The pain may come on without injury or identifiable cause. Pain is generally present when first getting out of bed in the morning or up from a seated break.     CAUSES  The plantar fascia is a dense fibrous band of tissue that stretches across the bottom surface of the foot. The fascia helps support the foot muscles and arch. Plantar fasciitis is thought to be caused by mechanical strain or overload. Frequent walking without shoes or wearing unsupportive shoes is thought to cause structural overload and ultimately inflammation of the plantar fascia. Some people have heel spurs that can be seen on x-ray. The heel spur is actually a minor component of plantar fascitis and is largely ignored.       SELF TREATMENT   The easiest solution is to stop walking around your home without shoes. Plantar fasciitis is largely a shoe problem. Shoes are either not being worn often enough or your current shoes are inadequate for your weight, foot structure or activity level. The majority of shoes on the market today are not sufficient to resist development of plantar fasciitis or to promote healing. Assume that your current shoes are inadequate and will need to be replaced.  Even high quality shoes wear out with 6 months to one year of frequent use. Weight loss is another option. Losing ten pounds in the next two months may be enough to resolve the problem. Ice applied to the area of pain two to three times per day for ten minutes each session can be very helpful. Warm foot soaks in epsom salts can also relieve pain. This should continue until the problem resolves. Achilles tendon stretching is essential. Stretch multiple times daily to promote healing and to prevent recurrence in the future. Over all stretching of the body is helpful as well such as the calves, thighs and lower back. Normally when one area of the body is tight, other areas are too. Gentle Yoga can be good for this.     Over the counter topical anti inflammatories can be helpful such as biofreeze, bengay, salon pas, ect...  Oral ibuprofen or aleve is recommended as well to try to calm down inflammation.     Night splints can be helpful to gradually stretch the foot at night as a lot of pain is when you get up in the morning. Taking a towel or thera band and stretching the foot back multiple times before you get ou of bed can be beneficial as well.     MEDICAL TREATMENT  Medical treatments often include custom arch supports, cortisone injections, physical therapy, splints to be worn in bed, prescription medications and surgery. The home treatments listed above will be necessary regardless of these advanced medical treatments. Surgery is rarely needed but is very helpful in selected cases.     PROGNOSIS  Plantar fasciitis can last from one day to a lifetime. Some people get intermittent fascitis that is very short-lived. Others suffer daily for years. Excessive body weight, frequent bare foot walking, long hours on the feet, inadequate shoes, predisposing foot structures and excessive activity such as running are all potential issues that lead to chronic and/or recurring plantar fascitis. Having plantar fasciitis means  that you are forever prone to this problem and will require modification of some of the above factors. Most people seek treatment within one to four months. Healing usually requires a similar one to four month time frame. Healing time is relative to the amount of effort spent treating the problem.   Plantar fasciitis is highly recurrent. Risk factors often continue, including return to bare foot walking, inadequate shoes, excessive body weight, excessive activities, etc. Your life style and foot structure may predispose you to recurrent plantar fasciitis. A daily prevention regimen can be very helpful. Ongoing use of shoe inserts, careful attention to appropriate shoes, daily Achilles stretching, etc. may prevent recurrence. Prompt attention at the earliest warning signs of heel pain can resolve the problem in as short as a few days.     EXERCISES  Stair Exercise: Step on the stairs with the ball of your foot and hold your position for at least 15 seconds, then slowly step down with the heels of your foot. You can do this daily and as often as you want.   Picking the Towel: Sit comfortably and then pick the towel up with your toes. You can use any object other than a towel as long as the material can be soft and you can pick it up with your toes.  Rolling the Bottle: Use a small ball or frozen water bottle and then roll it around with your foot.   Flex the Toes: Sit comfortably and then flex your toes by pointing it towards the floor or towards your body. This will relax and flex your foot and exercise your plantar fascia, the calf, and the Achilles tendon. The inability of the foot to stretch often causes the bunching up of the plantar fascia area leading to the pain.  Calf/Achilles Stretching: Lay on you back and raise one foot, then point your toes towards the floor. See photo below:               Hold each stretch for 10 seconds. Stretch 10 times per set, three sets per day. Morning, afternoon and evening. If  your heel pain is very severe in the morning, consider doing the first set of stretches before you get out of bed.      OVER THE COUNTER INSERT RECOMMENDATIONS  SuperFeet   Sofsole Fit Spenco   Power Step   Walk-Fit Arch Cradles     Most of these can be found at your local [employer], [employer], or online.  **A good high quality over the counter insert should cost around $40-$50      [place] [facility]  [address] [place] [address]  [phone]   [address]  [phone] [place]  [address] [facility]  [phone]   [place]  [address]  [phone]   [place]  [address] [facility]  [phone] [place] [address]  [phone]   [place]  [address]  [phone]   [place]  [address] [address]  [phone] [place]  [address]  [phone]              OVER THE COUNTER INSERTS    Most of these can be found at your local [employer], [employer], [employer], or online:    SuperFeet   Sofsole Fit Spenco   Power Step   Walk-Fit (Target)  *For heel pain* Arch Cradles  *For heel pain*       ** A good high quality over the counter insert should cost around $40-$50    ** Capsulitis/Metarasalgia - try adding a metatarsal pad on your inserts or find an insert with one built in

## 2024-02-08 RX ORDER — BUPIVACAINE HYDROCHLORIDE 5 MG/ML
0.5 INJECTION, SOLUTION PERINEURAL
Status: SHIPPED | OUTPATIENT
Start: 2024-01-29

## 2024-02-08 RX ORDER — TRIAMCINOLONE ACETONIDE 40 MG/ML
40 INJECTION, SUSPENSION INTRA-ARTICULAR; INTRAMUSCULAR
Status: SHIPPED | OUTPATIENT
Start: 2024-01-29

## 2024-02-12 ENCOUNTER — HOSPITAL ENCOUNTER (OUTPATIENT)
Dept: MRI IMAGING | Facility: CLINIC | Age: 59
Discharge: HOME | End: 2024-02-12
Attending: PHYSICIAN ASSISTANT | Admitting: PHYSICIAN ASSISTANT
Payer: COMMERCIAL

## 2024-02-12 DIAGNOSIS — Z90.13 HISTORY OF BILATERAL MASTECTOMY: ICD-10-CM

## 2024-02-12 DIAGNOSIS — C50.911 MALIGNANT NEOPLASM OF RIGHT FEMALE BREAST, UNSPECIFIED ESTROGEN RECEPTOR STATUS, UNSPECIFIED SITE OF BREAST (H): ICD-10-CM

## 2024-02-12 PROCEDURE — A9585 GADOBUTROL INJECTION: HCPCS | Performed by: PHYSICIAN ASSISTANT

## 2024-02-12 PROCEDURE — 77049 MRI BREAST C-+ W/CAD BI: CPT

## 2024-02-12 PROCEDURE — 255N000002 HC RX 255 OP 636: Performed by: PHYSICIAN ASSISTANT

## 2024-02-12 RX ORDER — GADOBUTROL 604.72 MG/ML
10 INJECTION INTRAVENOUS ONCE
Status: COMPLETED | OUTPATIENT
Start: 2024-02-12 | End: 2024-02-12

## 2024-02-12 RX ADMIN — GADOBUTROL 10 ML: 604.72 INJECTION INTRAVENOUS at 13:57

## 2024-02-23 ENCOUNTER — OFFICE VISIT (OUTPATIENT)
Dept: FAMILY MEDICINE | Facility: CLINIC | Age: 59
End: 2024-02-23
Payer: COMMERCIAL

## 2024-02-23 VITALS
WEIGHT: 210.2 LBS | BODY MASS INDEX: 33.78 KG/M2 | TEMPERATURE: 98 F | SYSTOLIC BLOOD PRESSURE: 138 MMHG | HEIGHT: 66 IN | HEART RATE: 71 BPM | DIASTOLIC BLOOD PRESSURE: 82 MMHG | RESPIRATION RATE: 12 BRPM | OXYGEN SATURATION: 97 %

## 2024-02-23 DIAGNOSIS — C50.911 MALIGNANT NEOPLASM OF RIGHT FEMALE BREAST, UNSPECIFIED ESTROGEN RECEPTOR STATUS, UNSPECIFIED SITE OF BREAST (H): ICD-10-CM

## 2024-02-23 DIAGNOSIS — Z00.00 ROUTINE GENERAL MEDICAL EXAMINATION AT A HEALTH CARE FACILITY: Primary | ICD-10-CM

## 2024-02-23 DIAGNOSIS — E78.1 HIGH TRIGLYCERIDES: ICD-10-CM

## 2024-02-23 DIAGNOSIS — I10 ESSENTIAL HYPERTENSION WITH GOAL BLOOD PRESSURE LESS THAN 140/90: ICD-10-CM

## 2024-02-23 DIAGNOSIS — F32.0 MILD MAJOR DEPRESSION (H): ICD-10-CM

## 2024-02-23 DIAGNOSIS — R79.82 ELEVATED C-REACTIVE PROTEIN (CRP): ICD-10-CM

## 2024-02-23 PROBLEM — E66.01 MORBID OBESITY (H): Status: RESOLVED | Noted: 2021-11-02 | Resolved: 2024-02-23

## 2024-02-23 LAB
ALBUMIN SERPL BCG-MCNC: 4.7 G/DL (ref 3.5–5.2)
ALP SERPL-CCNC: 80 U/L (ref 40–150)
ALT SERPL W P-5'-P-CCNC: 34 U/L (ref 0–50)
ANION GAP SERPL CALCULATED.3IONS-SCNC: 15 MMOL/L (ref 7–15)
AST SERPL W P-5'-P-CCNC: 29 U/L (ref 0–45)
BILIRUB SERPL-MCNC: 0.5 MG/DL
BUN SERPL-MCNC: 14.5 MG/DL (ref 6–20)
CALCIUM SERPL-MCNC: 10.1 MG/DL (ref 8.6–10)
CHLORIDE SERPL-SCNC: 98 MMOL/L (ref 98–107)
CHOLEST SERPL-MCNC: 249 MG/DL
CREAT SERPL-MCNC: 0.64 MG/DL (ref 0.51–0.95)
CRP SERPL-MCNC: 7.23 MG/L
DEPRECATED HCO3 PLAS-SCNC: 26 MMOL/L (ref 22–29)
EGFRCR SERPLBLD CKD-EPI 2021: >90 ML/MIN/1.73M2
ERYTHROCYTE [DISTWIDTH] IN BLOOD BY AUTOMATED COUNT: 12.4 % (ref 10–15)
FASTING STATUS PATIENT QL REPORTED: YES
GLUCOSE SERPL-MCNC: 110 MG/DL (ref 70–99)
HBA1C MFR BLD: 6.2 % (ref 0–5.6)
HCT VFR BLD AUTO: 41.2 % (ref 35–47)
HDLC SERPL-MCNC: 53 MG/DL
HGB BLD-MCNC: 13.7 G/DL (ref 11.7–15.7)
LDLC SERPL CALC-MCNC: 164 MG/DL
MCH RBC QN AUTO: 29.7 PG (ref 26.5–33)
MCHC RBC AUTO-ENTMCNC: 33.3 G/DL (ref 31.5–36.5)
MCV RBC AUTO: 89 FL (ref 78–100)
NONHDLC SERPL-MCNC: 196 MG/DL
PLATELET # BLD AUTO: 427 10E3/UL (ref 150–450)
POTASSIUM SERPL-SCNC: 3.5 MMOL/L (ref 3.4–5.3)
PROT SERPL-MCNC: 8.2 G/DL (ref 6.4–8.3)
RBC # BLD AUTO: 4.61 10E6/UL (ref 3.8–5.2)
SODIUM SERPL-SCNC: 139 MMOL/L (ref 135–145)
TRIGL SERPL-MCNC: 158 MG/DL
WBC # BLD AUTO: 9.2 10E3/UL (ref 4–11)

## 2024-02-23 PROCEDURE — 99214 OFFICE O/P EST MOD 30 MIN: CPT | Mod: 25 | Performed by: PHYSICIAN ASSISTANT

## 2024-02-23 PROCEDURE — 86140 C-REACTIVE PROTEIN: CPT | Performed by: PHYSICIAN ASSISTANT

## 2024-02-23 PROCEDURE — 99396 PREV VISIT EST AGE 40-64: CPT | Performed by: PHYSICIAN ASSISTANT

## 2024-02-23 PROCEDURE — 36415 COLL VENOUS BLD VENIPUNCTURE: CPT | Performed by: PHYSICIAN ASSISTANT

## 2024-02-23 PROCEDURE — 85027 COMPLETE CBC AUTOMATED: CPT | Performed by: PHYSICIAN ASSISTANT

## 2024-02-23 PROCEDURE — 83036 HEMOGLOBIN GLYCOSYLATED A1C: CPT | Performed by: PHYSICIAN ASSISTANT

## 2024-02-23 PROCEDURE — 80053 COMPREHEN METABOLIC PANEL: CPT | Performed by: PHYSICIAN ASSISTANT

## 2024-02-23 PROCEDURE — 80061 LIPID PANEL: CPT | Performed by: PHYSICIAN ASSISTANT

## 2024-02-23 RX ORDER — ROSUVASTATIN CALCIUM 20 MG/1
20 TABLET, COATED ORAL DAILY
Qty: 90 TABLET | Refills: 1 | Status: SHIPPED | OUTPATIENT
Start: 2024-02-23

## 2024-02-23 RX ORDER — BUPROPION HYDROCHLORIDE 300 MG/1
300 TABLET ORAL EVERY MORNING
Qty: 90 TABLET | Refills: 3 | Status: SHIPPED | OUTPATIENT
Start: 2024-02-23

## 2024-02-23 RX ORDER — METOPROLOL SUCCINATE 50 MG/1
50 TABLET, EXTENDED RELEASE ORAL DAILY
Qty: 90 TABLET | Refills: 3 | Status: SHIPPED | OUTPATIENT
Start: 2024-02-23

## 2024-02-23 RX ORDER — LOSARTAN POTASSIUM AND HYDROCHLOROTHIAZIDE 25; 100 MG/1; MG/1
1 TABLET ORAL DAILY
Qty: 90 TABLET | Refills: 3 | Status: SHIPPED | OUTPATIENT
Start: 2024-02-23

## 2024-02-23 SDOH — HEALTH STABILITY: PHYSICAL HEALTH: ON AVERAGE, HOW MANY DAYS PER WEEK DO YOU ENGAGE IN MODERATE TO STRENUOUS EXERCISE (LIKE A BRISK WALK)?: 3 DAYS

## 2024-02-23 SDOH — HEALTH STABILITY: PHYSICAL HEALTH: ON AVERAGE, HOW MANY MINUTES DO YOU ENGAGE IN EXERCISE AT THIS LEVEL?: 30 MIN

## 2024-02-23 ASSESSMENT — LIFESTYLE VARIABLES: HOW OFTEN DO YOU HAVE A DRINK CONTAINING ALCOHOL: NEVER

## 2024-02-23 ASSESSMENT — SOCIAL DETERMINANTS OF HEALTH (SDOH)
IN A TYPICAL WEEK, HOW MANY TIMES DO YOU TALK ON THE PHONE WITH FAMILY, FRIENDS, OR NEIGHBORS?: ONCE A WEEK
HOW OFTEN DO YOU ATTEND CHURCH OR RELIGIOUS SERVICES?: 1 TO 4 TIMES PER YEAR
HOW OFTEN DO YOU GET TOGETHER WITH FRIENDS OR RELATIVES?: ONCE A WEEK
HOW OFTEN DO YOU GET TOGETHER WITH FRIENDS OR RELATIVES?: ONCE A WEEK
DO YOU BELONG TO ANY CLUBS OR ORGANIZATIONS SUCH AS CHURCH GROUPS UNIONS, FRATERNAL OR ATHLETIC GROUPS, OR SCHOOL GROUPS?: YES
HOW OFTEN DO YOU GET TOGETHER WITH FRIENDS OR RELATIVES?: ONCE A WEEK
HOW OFTEN DO YOU ATTENT MEETINGS OF THE CLUB OR ORGANIZATION YOU BELONG TO?: MORE THAN 4 TIMES PER YEAR

## 2024-02-23 NOTE — PROGRESS NOTES
"Preventive Care Visit  [facility]  Susan Chaves PA-C, Family Medicine  Feb 23, 2024    Assessment & Plan     (Z00.00) Routine general medical examination at a health care facility  (primary encounter diagnosis)  Comment:   Plan: CBC with platelets, Comprehensive metabolic         panel (BMP + Alb, Alk Phos, ALT, AST, Total.         Bili, TP), Hemoglobin A1c            (C50.911) Malignant neoplasm of right female breast, unspecified estrogen receptor status, unspecified site of breast (H)  Comment:   Plan: no symptoms. Recent MRI normal.     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     (I10) Essential hypertension with goal blood pressure less than 140/90  Comment: metoprolol adjusted. Bp not optimized.   Nurse only bp check 4 weeks.   Plan: metoprolol succinate ER (TOPROL XL) 50 MG 24 hr        tablet, losartan-hydrochlorothiazide (HYZAAR)         100-25 MG tablet            (E78.1) High triglycerides  Comment: has been off the statin for 2 months. Restart and then follow-up with cardiology in 3 months.   Plan: Lipid panel reflex to direct LDL Non-fasting,         rosuvastatin (CRESTOR) 20 MG tablet            (R79.82) Elevated C-reactive protein (CRP)  Comment: await labs.   Plan: CRP, inflammation            Patient has been advised of split billing requirements and indicates understanding: No          BMI  Estimated body mass index is 34.45 kg/m  as calculated from the following:    Height as of this encounter: 1.664 m (5' 5.5\").    Weight as of this encounter: 95.3 kg (210 lb 3.2 oz).   Weight management plan: Discussed healthy diet and exercise guidelines    Counseling  Appropriate preventive services were discussed with this patient, including applicable screening as appropriate for fall prevention, nutrition, physical activity, Tobacco-use cessation, weight loss and cognition.  Checklist reviewing preventive services available has been given to the " patient.  Reviewed patient's diet, addressing concerns and/or questions.   She is at risk for lack of exercise and has been provided with information to increase physical activity for the benefit of her well-being.   The patient was instructed to see the dentist every 6 months.       See Patient Instructions    Subjective   Alison is a 58 year old, presenting for the following:  Physical        2/23/2024     1:26 PM   Additional Questions   Roomed by Yoli WANG        Health Care Directive  Patient does not have a Health Care Directive or Living Will: Discussed advance care planning with patient; however, patient declined at this time.    Healthy Habits:     Getting at least 3 servings of Calcium per day:  Yes    Bi-annual eye exam:  NO    Dental care twice a year:  NO    Sleep apnea or symptoms of sleep apnea:  None    Diet:  Regular (no restrictions)    Frequency of exercise:  2-3 days/week    Duration of exercise:  15-30 minutes    Taking medications regularly:  Yes    Barriers to taking medications:  None    Medication side effects:  None    Additional concerns today:  No                2/23/2024   General Health   How would you rate your overall physical health? Good   Feel stress (tense, anxious, or unable to sleep) To some extent    To some extent   (!) STRESS CONCERN      2/23/2024   Nutrition   Three or more servings of calcium each day? Yes   Diet: Regular (no restrictions)   How many servings of fruit and vegetables per day? (!) 2-3   How many sweetened beverages each day? 0-1         2/23/2024   Exercise   Days per week of moderate/strenous exercise 3 days    3 days   Average minutes spent exercising at this level 30 min         2/23/2024   Social Factors   Frequency of gathering with friends or relatives Once a week    Once a week   Worry food won't last until get money to buy more No    Yes   Food not last or not have enough money for food? No    Yes   Do you have housing?  Yes    Yes   Are you worried  about losing your housing? No    No   Lack of transportation? No    No   Unable to get utilities (heat,electricity)? No    No         2024   Fall Risk   Fallen 2 or more times in the past year? No    No   Trouble with walking or balance? No    No          2024   Dental   Dentist two times every year? (!) NO         2024   TB Screening   Were you born outside of [place]?  No         Today's PHQ-9 Score:       2023     2:58 PM   PHQ-9 SCORE   PHQ-9 Total Score MyChart 3 (Minimal depression)   PHQ-9 Total Score 3           2024   Substance Use   Frequency of drinking alcohol? Never   Alcohol more than 3/day or more than 7/wk No   Do you use any other substances recreationally? No     Social History     Tobacco Use    Smoking status: Former     Years: 10     Types: Cigarettes     Start date: 6/15/1982     Quit date: 1998     Years since quittin.7    Smokeless tobacco: Never    Tobacco comments:     Quit in    Vaping Use    Vaping Use: Never used   Substance Use Topics    Alcohol use: No     Comment: none    Drug use: No           2021   LAST FHS-7 RESULTS   1st degree relative breast or ovarian cancer Yes   Any relative bilateral breast cancer Unknown   Any male have breast cancer Yes   Any woman have breast and ovarian cancer Yes   Any woman with breast cancer before 50yrs Yes   2 or more relatives with breast and/or ovarian cancer Yes   2 or more relatives with breast and/or bowel cancer Yes        Mammogram/breast MRI every 6 months.         2024   STI Screening   New sexual partner(s) since last STI/HIV test? No     History of abnormal Pap smear: NO - age 30-65 PAP every 5 years with negative HPV co-testing recommended        2010    12:00 AM 3/24/2009    12:00 AM   PAP / HPV   PAP (Historical) OTHER-NIL EM>40  OTHER-NIL EM>40      ASCVD Risk   The 10-year ASCVD risk score (Lukas LEACH, et al., 2019) is: 6.3%    Values used to calculate the score:      Age: 58  "years      Sex: Female      Is Non- : No      Diabetic: No      Tobacco smoker: No      Systolic Blood Pressure: 148 mmHg      Is BP treated: Yes      HDL Cholesterol: 50 mg/dL      Total Cholesterol: 250 mg/dL           Reviewed and updated as needed this visit by Provider                    Past Medical History:   Diagnosis Date    Allergic rhinitis, cause unspecified     Anxiety     pt requests relaxant prior to surgery    BENIGN HYPERTENSION 11/02/2004    Calculus of kidney     Elevated rheumatoid factor 12/02/2011    HTN, goal below 140/90 06/13/2013    Infectious mononucleosis     Malignant neoplasm (H)     Migraine 02/20/2012         Review of Systems  Constitutional, neuro, ENT, endocrine, pulmonary, cardiac, gastrointestinal, genitourinary, musculoskeletal, integument and psychiatric systems are negative, except as otherwise noted.     Objective    Exam  /82   Pulse 71   Temp 98  F (36.7  C) (Oral)   Resp 12   Ht 1.664 m (5' 5.5\")   Wt 95.3 kg (210 lb 3.2 oz)   LMP 09/09/2010   SpO2 97%   BMI 34.45 kg/m     Estimated body mass index is 34.45 kg/m  as calculated from the following:    Height as of this encounter: 1.664 m (5' 5.5\").    Weight as of this encounter: 95.3 kg (210 lb 3.2 oz).    Physical Exam  GENERAL: alert and no distress  EYES: Eyes grossly normal to inspection, PERRL and conjunctivae and sclerae normal  HENT: ear canals and TM's normal, nose and mouth without ulcers or lesions  NECK: no adenopathy, no asymmetry, masses, or scars  RESP: lungs clear to auscultation - no rales, rhonchi or wheezes  CV: regular rate and rhythm, normal S1 S2, no S3 or S4, no murmur, click or rub, no peripheral edema  ABDOMEN: soft, nontender, no hepatosplenomegaly, no masses and bowel sounds normal  MS: no gross musculoskeletal defects noted, no edema  SKIN: no suspicious lesions or rashes  NEURO: Normal strength and tone, mentation intact and speech normal  PSYCH: mentation " appears normal, affect normal/bright      Signed Electronically by: Susan Chaves PA-C

## 2024-02-23 NOTE — COMMUNITY RESOURCES LIST (ENGLISH)
02/23/2024   [facility]  N/A  For questions about this resource list or additional care needs, please contact your primary care clinic or care manager.  Phone: [phone]   Email: N/A   Address: [address] [place] [address]   Hours: N/A        Food and Nutrition       Food pantry  1  [address] [facility] Food Shelf Distance: 1.21 miles      In-Person, Pickup   [address] [place] [address]  Language: English  Hours: Mon 12:00 PM - 6:00 PM Appt. Only, Tue 10:00 AM - 6:00 PM Appt. Only, Thu 10:00 AM - 6:00 PM Appt. Only, Sat 9:00 AM - 12:00 PM Appt. Only  Fees: Free   Phone: (139) 824-3181 Email: info@[employer] Website: https://www.[employer]/     2  [facility] [employer] - Food Shelf Distance: 2.71 miles      [facility]   [address] [place] [address]  Language: English  Hours: Mon 4:00 PM - 6:00 PM , Tue 11:00 AM - 2:00 PM , Wed 4:00 PM - 6:00 PM , Thu 1:00 PM - 3:00 PM  Fees: Free   Phone: (584) 100-7073 Email: resources@[employer] Website: https://[employer]hack.org/mission/mission-outpost/     SNAP application assistance  3  [address] [place] [address] Distance: 4.74 miles      In-Person   [address] [place] [address]  Language: English  Hours: Mon 8:00 AM - 4:00 PM , Tue 8:00 AM - 7:00 PM , Wed - Thu 8:00 AM - 4:00 PM  Fees: Free   Phone: (806) 246-6235 Email: info@[employer] Website: https://[employer]/resources/resource-centers/     4  Community Action Partnership (CAP) [facility]  [facility] Distance: 6.01 miles      In-Person   [address], [place] [address]  Language: English, Faroese  Hours: Mon - Fri 8:00 AM - 8:00 PM  Fees: Free   Phone: (507) 112-4595 Email: info@[employer] Website: http://www.[employer].org     Soup kitchen or free meals  5  [place] [facility] [place] [facility] - Loaves and Fishes Distance: 5.68 miles       Pickup   [address] [place] [address]  Language: English, Amharic  Hours: Mon - Thu 5:30 PM - 6:30 PM  Fees: Free   Phone: (883) 414-4124 Email: gavin@[employer] Website: http://[employer]/?page_id=5168     6  [facility] and [facility] Distance: 9.02 miles      Pickup   [address] [place] [address]  Language: English  Hours: Mon - Fri 5:00 PM - 6:00 PM  Fees: Free   Phone: (330) 322-5146 Email: contactus@[employer].org Website: https://www.[employer].org/          Important Numbers & Websites       Emergency Services   911  [facility]   311  Poison Control   (417) 495-2490  Suicide Prevention Lifeline   (890) 884-5133 (TALK)  Child Abuse Hotline   (255) 426-5041 (4-A-Child)  Sexual Assault Hotline   (261) 405-2391 (HOPE)  National Runaway Safeline   (558) 583-2893 (RUNAWAY)  All-Options Talkline   (730) 910-8995  Substance Abuse Referral   (116) 626-7699 (HELP)

## 2024-02-23 NOTE — COMMUNITY RESOURCES LIST (ENGLISH)
02/23/2024   [facility]  N/A  For questions about this resource list or additional care needs, please contact your primary care clinic or care manager.  Phone: [phone]   Email: N/A   Address: [address] [place] [address]   Hours: N/A        Food and Nutrition       Food pantry  1  [address] [facility] Food Shelf Distance: 1.21 miles      In-Person, Pickup   [address] [place] [address]  Language: English  Hours: Mon 12:00 PM - 6:00 PM Appt. Only, Tue 10:00 AM - 6:00 PM Appt. Only, Thu 10:00 AM - 6:00 PM Appt. Only, Sat 9:00 AM - 12:00 PM Appt. Only  Fees: Free   Phone: (803) 872-6723 Email: info@[employer] Website: https://www.[employer]/     2  [facility] - Food Shelf Distance: 2.71 miles      [facility]   [address] [place] [address]  Language: English  Hours: Mon 4:00 PM - 6:00 PM , Tue 11:00 AM - 2:00 PM , Wed 4:00 PM - 6:00 PM , Thu 1:00 PM - 3:00 PM  Fees: Free   Phone: (568) 302-1313 Email: resources@[employer] Website: https://[employer].org/mission/mission-outpost/     SNAP application assistance  3  [address] [place] [address] Distance: 4.74 miles      In-Person   [address] [place] [address]  Language: English  Hours: Mon 8:00 AM - 4:00 PM , Tue 8:00 AM - 7:00 PM , Wed - Thu 8:00 AM - 4:00 PM  Fees: Free   Phone: (951) 506-9475 Email: info@[employer] Website: https://[employer]/resources/resource-centers/     4  Community Action Partnership (CAP) [facility]  [address] [facility] Distance: 6.01 miles      In-Person   [address], [place] [address]  Language: English, Georgian  Hours: Mon - Fri 8:00 AM - 8:00 PM  Fees: Free   Phone: (861) 300-9900 Email: info@[employer] Website: http://www.[employer].org     Soup kitchen or free meals  5  [place] by the [facility] - Loaves [employer] Fishes Distance: 5.68 miles       Pickup   [address] [place] [address]  Language: English, Frisian  Hours: Mon - Thu 5:30 PM - 6:30 PM  Fees: Free   Phone: (715) 974-7841 Email: gavin@[employer] Website: http://[employer]/?page_id=5168     6  [facility] Distance: 9.02 miles      Pickup   [address] [place] [address]  Language: English  Hours: Mon - Fri 5:00 PM - 6:00 PM  Fees: Free   Phone: (490) 429-6630 Email: contactus@[employer].org Website: https://www.EpiBone.org/          Important Numbers & Websites       Emergency Services   911  [facility]   311  Poison Control   (734) 128-1963  Suicide Prevention Lifeline   (216) 440-1796 (TALK)  Child Abuse Hotline   (593) 250-4478 (4-A-Child)  Sexual Assault Hotline   (166) 756-1545 (HOPE)  National Runaway Safeline   (913) 423-2094 (RUNAWAY)  All-Options Talkline   (749) 447-3931  Substance Abuse Referral   (734) 888-6157 (HELP)

## 2024-02-23 NOTE — PATIENT INSTRUCTIONS
Preventive Care Advice   This is general advice given by our system to help you stay healthy. However, your care team may have specific advice just for you. Please talk to your care team about your preventive care needs.  Nutrition  Eat 5 or more servings of fruits and vegetables each day.  Try wheat bread, brown rice and whole grain pasta (instead of white bread, rice, and pasta).  Get enough calcium and vitamin D. Check the label on foods and aim for 100% of the RDA (recommended daily allowance).  Lifestyle  Exercise at least 150 minutes each week  (30 minutes a day, 5 days a week).  Do muscle strengthening activities 2 days a week. These help control your weight and prevent disease.  No smoking.  Wear sunscreen to prevent skin cancer.  Have a dental exam and cleaning every 6 months.  Yearly exams  See your health care team every year to talk about:  Any changes in your health.  Any medicines your care team has prescribed.  Preventive care, family planning, and ways to prevent chronic diseases.  Shots (vaccines)   HPV shots (up to age 26), if you've never had them before.  Hepatitis B shots (up to age 59), if you've never had them before.  COVID-19 shot: Get this shot when it's due.  Flu shot: Get a flu shot every year.  Tetanus shot: Get a tetanus shot every 10 years.  Pneumococcal, hepatitis A, and RSV shots: Ask your care team if you need these based on your risk.  Shingles shot (for age 50 and up)  General health tests  Diabetes screening:  Starting at age 35, Get screened for diabetes at least every 3 years.  If you are younger than age 35, ask your care team if you should be screened for diabetes.  Cholesterol test: At age 39, start having a cholesterol test every 5 years, or more often if advised.  Bone density scan (DEXA): At age 50, ask your care team if you should have this scan for osteoporosis (brittle bones).  Hepatitis C: Get tested at least once in your life.  STIs (sexually transmitted  infections)  Before age 24: Ask your care team if you should be screened for STIs.  After age 24: Get screened for STIs if you're at risk. You are at risk for STIs (including HIV) if:  You are sexually active with more than one person.  You don't use condoms every time.  You or a partner was diagnosed with a sexually transmitted infection.  If you are at risk for HIV, ask about PrEP medicine to prevent HIV.  Get tested for HIV at least once in your life, whether you are at risk for HIV or not.  Cancer screening tests  Cervical cancer screening: If you have a cervix, begin getting regular cervical cancer screening tests starting at age 21.  Breast cancer scan (mammogram): If you've ever had breasts, begin having regular mammograms starting at age 40. This is a scan to check for breast cancer.  Colon cancer screening: It is important to start screening for colon cancer at age 45.  Have a colonoscopy test every 10 years (or more often if you're at risk) Or, ask your provider about stool tests like a FIT test every year or Cologuard test every 3 years.  To learn more about your testing options, visit:   https://www.[employer]/542646.pdf.  For help making a decision, visit:   https://bit.ly/mk58654.  Prostate cancer screening test: If you have a prostate, ask your care team if a prostate cancer screening test (PSA) at age 55 is right for you.  Lung cancer screening: If you are a current or former smoker ages 50 to 80, ask your care team if ongoing lung cancer screenings are right for you.  For informational purposes only. Not to replace the advice of your health care provider. Copyright   2023 [facility]. All rights reserved. Clinically reviewed by the [facility]. Migo.me 027103 - REV 01/24.    Learning About Stress  What is stress?     Stress is your body's response to a hard situation. Your body can have a physical, emotional, or mental response. Stress is a fact of life for  most people, and it affects everyone differently. What causes stress for you may not be stressful for someone else.  A lot of things can cause stress. You may feel stress when you go on a job interview, take a test, or run a race. This kind of short-term stress is normal and even useful. It can help you if you need to work hard or react quickly. For example, stress can help you finish an important job on time.  Long-term stress is caused by ongoing stressful situations or events. Examples of long-term stress include long-term health problems, ongoing problems at work, or conflicts in your family. Long-term stress can harm your health.  How does stress affect your health?  When you are stressed, your body responds as though you are in danger. It makes hormones that speed up your heart, make you breathe faster, and give you a burst of energy. This is called the fight-or-flight stress response. If the stress is over quickly, your body goes back to normal and no harm is done.  But if stress happens too often or lasts too long, it can have bad effects. Long-term stress can make you more likely to get sick, and it can make symptoms of some diseases worse. If you tense up when you are stressed, you may develop neck, shoulder, or low back pain. Stress is linked to high blood pressure and heart disease.  Stress also harms your emotional health. It can make you candelario, tense, or depressed. Your relationships may suffer, and you may not do well at work or school.  What can you do to manage stress?  You can try these things to help manage stress:   Do something active. Exercise or activity can help reduce stress. Walking is a great way to get started. Even everyday activities such as housecleaning or yard work can help.  Try yoga or pamela chi. These techniques combine exercise and meditation. You may need some training at first to learn them.  Do something you enjoy. For example, listen to music or go to a movie. Practice your  "hobby or do volunteer work.  Meditate. This can help you relax, because you are not worrying about what happened before or what may happen in the future.  Do guided imagery. Imagine yourself in any setting that helps you feel calm. You can use online videos, books, or a teacher to guide you.  Do breathing exercises. For example:  From a standing position, bend forward from the waist with your knees slightly bent. Let your arms dangle close to the floor.  Breathe in slowly and deeply as you return to a standing position. Roll up slowly and lift your head last.  Hold your breath for just a few seconds in the standing position.  Breathe out slowly and bend forward from the waist.  Let your feelings out. Talk, laugh, cry, and express anger when you need to. Talking with supportive friends or family, a counselor, or a michelle leader about your feelings is a healthy way to relieve stress. Avoid discussing your feelings with people who make you feel worse.  Write. It may help to write about things that are bothering you. This helps you find out how much stress you feel and what is causing it. When you know this, you can find better ways to cope.  What can you do to prevent stress?  You might try some of these things to help prevent stress:  Manage your time. This helps you find time to do the things you want and need to do.  Get enough sleep. Your body recovers from the stresses of the day while you are sleeping.  Get support. Your family, friends, and community can make a difference in how you experience stress.  Limit your news feed. Avoid or limit time on social media or news that may make you feel stressed.  Do something active. Exercise or activity can help reduce stress. Walking is a great way to get started.  Where can you learn more?  Go to https://www.healthwise.net/patiented  Enter N032 in the search box to learn more about \"Learning About Stress.\"  Current as of: February 26, 2023               Content Version: " 13.8    3002-8124 [employer].   Care instructions adapted under license by your healthcare professional. If you have questions about a medical condition or this instruction, always ask your healthcare professional. [employer] disclaims any warranty or liability for your use of this information.

## 2024-06-18 DIAGNOSIS — E78.1 HIGH TRIGLYCERIDES: ICD-10-CM

## 2024-06-18 RX ORDER — ROSUVASTATIN CALCIUM 20 MG/1
20 TABLET, COATED ORAL DAILY
Qty: 90 TABLET | Refills: 1 | OUTPATIENT
Start: 2024-06-18

## 2024-06-24 ENCOUNTER — VIRTUAL VISIT (OUTPATIENT)
Dept: URGENT CARE | Facility: CLINIC | Age: 59
End: 2024-06-24
Payer: COMMERCIAL

## 2024-06-24 DIAGNOSIS — R30.0 DYSURIA: Primary | ICD-10-CM

## 2024-06-24 PROCEDURE — 99441 PR PHYSICIAN TELEPHONE EVALUATION 5-10 MIN: CPT | Mod: 93

## 2024-06-24 RX ORDER — NITROFURANTOIN 25; 75 MG/1; MG/1
100 CAPSULE ORAL 2 TIMES DAILY
Qty: 10 CAPSULE | Refills: 0 | Status: SHIPPED | OUTPATIENT
Start: 2024-06-24 | End: 2024-06-29

## 2024-06-24 RX ORDER — PHENAZOPYRIDINE HYDROCHLORIDE 100 MG/1
100 TABLET, FILM COATED ORAL 3 TIMES DAILY PRN
Qty: 6 TABLET | Refills: 0 | Status: SHIPPED | OUTPATIENT
Start: 2024-06-24 | End: 2024-06-26

## 2024-06-24 NOTE — PROGRESS NOTES
Alison is a 58 year old who is being evaluated via a billable telephone visit.    What phone number would you like to be contacted at? [phone]  How would you like to obtain your AVS? MyChart      Assessment & Plan     (R30.0) Dysuria  (primary encounter diagnosis)    Plan: nitroFURantoin macrocrystal-monohydrate         (MACROBID) 100 MG capsule  Pyridium TID prn X 2 days  REX Juarez CNP      Subjective   Alison is a 58 year old, presenting for the following health issues:  UTI    HPI   Has urinary sx pressure urgency frequency cloudy a couple days worsening yesterday and today  No hematuria vomiting flank pain fever chills vaginal discharge  Hydrated     Objective           Vitals:  No vitals were obtained today due to virtual visit.    Physical Exam   GENERAL: alert and no distress  PSYCH: Appropriate affect, tone, and pace of words      Telephone time spent 10 minutes  REX Juarez CNP  Signed Electronically by: [facility]

## 2025-01-16 DIAGNOSIS — I10 ESSENTIAL HYPERTENSION WITH GOAL BLOOD PRESSURE LESS THAN 140/90: ICD-10-CM

## 2025-01-27 RX ORDER — METOPROLOL SUCCINATE 50 MG/1
50 TABLET, EXTENDED RELEASE ORAL DAILY
Qty: 90 TABLET | Refills: 0 | Status: SHIPPED | OUTPATIENT
Start: 2025-01-27

## 2025-01-27 NOTE — TELEPHONE ENCOUNTER
Patient calling for refill. Pharmacy sent request on 1/16/25. She took her last dose of metoprolol today.     Appointments in Next Year      Mar 06, 2025 8:30 AM  (Arrive by 8:10 AM)  Annual Wellness Visit with REX Keen CNP  [facility] ([facility] [place] ) [phone]          Stephanie Briggs RN 1/27/2025 10:37 AM  [facility]

## 2025-03-06 ENCOUNTER — OFFICE VISIT (OUTPATIENT)
Dept: FAMILY MEDICINE | Facility: CLINIC | Age: 60
End: 2025-03-06
Payer: COMMERCIAL

## 2025-03-06 ENCOUNTER — ORDERS ONLY (AUTO-RELEASED) (OUTPATIENT)
Dept: FAMILY MEDICINE | Facility: CLINIC | Age: 60
End: 2025-03-06

## 2025-03-06 VITALS
SYSTOLIC BLOOD PRESSURE: 138 MMHG | OXYGEN SATURATION: 97 % | DIASTOLIC BLOOD PRESSURE: 78 MMHG | TEMPERATURE: 98.3 F | RESPIRATION RATE: 14 BRPM | WEIGHT: 220.5 LBS | HEART RATE: 85 BPM | BODY MASS INDEX: 36.74 KG/M2 | HEIGHT: 65 IN

## 2025-03-06 DIAGNOSIS — I25.10 CORONARY ARTERY CALCIFICATION: ICD-10-CM

## 2025-03-06 DIAGNOSIS — E66.812 CLASS 2 OBESITY WITHOUT SERIOUS COMORBIDITY WITH BODY MASS INDEX (BMI) OF 36.0 TO 36.9 IN ADULT, UNSPECIFIED OBESITY TYPE: ICD-10-CM

## 2025-03-06 DIAGNOSIS — R79.82 ELEVATED C-REACTIVE PROTEIN (CRP): ICD-10-CM

## 2025-03-06 DIAGNOSIS — T78.40XD ALLERGIC SYMPTOMS, SUBSEQUENT ENCOUNTER: ICD-10-CM

## 2025-03-06 DIAGNOSIS — F32.0 MILD MAJOR DEPRESSION: ICD-10-CM

## 2025-03-06 DIAGNOSIS — R73.03 PRE-DIABETES: ICD-10-CM

## 2025-03-06 DIAGNOSIS — Z00.00 ROUTINE GENERAL MEDICAL EXAMINATION AT A HEALTH CARE FACILITY: Primary | ICD-10-CM

## 2025-03-06 DIAGNOSIS — R76.8 ELEVATED RHEUMATOID FACTOR: ICD-10-CM

## 2025-03-06 DIAGNOSIS — E78.1 HIGH TRIGLYCERIDES: ICD-10-CM

## 2025-03-06 DIAGNOSIS — Z12.11 SCREEN FOR COLON CANCER: ICD-10-CM

## 2025-03-06 DIAGNOSIS — J44.89 CHRONIC RECURRENT BRONCHIOLITIS (H): ICD-10-CM

## 2025-03-06 DIAGNOSIS — I10 ESSENTIAL HYPERTENSION WITH GOAL BLOOD PRESSURE LESS THAN 140/90: ICD-10-CM

## 2025-03-06 LAB
ALBUMIN SERPL BCG-MCNC: 4.6 G/DL (ref 3.5–5.2)
ALP SERPL-CCNC: 76 U/L (ref 40–150)
ALT SERPL W P-5'-P-CCNC: 48 U/L (ref 0–50)
ANION GAP SERPL CALCULATED.3IONS-SCNC: 12 MMOL/L (ref 7–15)
AST SERPL W P-5'-P-CCNC: 36 U/L (ref 0–45)
BILIRUB SERPL-MCNC: 0.4 MG/DL
BUN SERPL-MCNC: 20.1 MG/DL (ref 8–23)
CALCIUM SERPL-MCNC: 9.8 MG/DL (ref 8.8–10.4)
CHLORIDE SERPL-SCNC: 98 MMOL/L (ref 98–107)
CHOLEST SERPL-MCNC: 240 MG/DL
CREAT SERPL-MCNC: 0.68 MG/DL (ref 0.51–0.95)
CRP SERPL-MCNC: 7 MG/L
EGFRCR SERPLBLD CKD-EPI 2021: >90 ML/MIN/1.73M2
ERYTHROCYTE [DISTWIDTH] IN BLOOD BY AUTOMATED COUNT: 12.6 % (ref 10–15)
EST. AVERAGE GLUCOSE BLD GHB EST-MCNC: 134 MG/DL
FASTING STATUS PATIENT QL REPORTED: ABNORMAL
FASTING STATUS PATIENT QL REPORTED: ABNORMAL
GLUCOSE SERPL-MCNC: 145 MG/DL (ref 70–99)
HBA1C MFR BLD: 6.3 % (ref 0–5.6)
HCO3 SERPL-SCNC: 28 MMOL/L (ref 22–29)
HCT VFR BLD AUTO: 41.6 % (ref 35–47)
HDLC SERPL-MCNC: 45 MG/DL
HGB BLD-MCNC: 14.2 G/DL (ref 11.7–15.7)
LDLC SERPL CALC-MCNC: 146 MG/DL
MCH RBC QN AUTO: 30.3 PG (ref 26.5–33)
MCHC RBC AUTO-ENTMCNC: 34.1 G/DL (ref 31.5–36.5)
MCV RBC AUTO: 89 FL (ref 78–100)
NONHDLC SERPL-MCNC: 195 MG/DL
PLATELET # BLD AUTO: 438 10E3/UL (ref 150–450)
POTASSIUM SERPL-SCNC: 3.8 MMOL/L (ref 3.4–5.3)
PROT SERPL-MCNC: 8.1 G/DL (ref 6.4–8.3)
RBC # BLD AUTO: 4.68 10E6/UL (ref 3.8–5.2)
SODIUM SERPL-SCNC: 138 MMOL/L (ref 135–145)
TRIGL SERPL-MCNC: 246 MG/DL
TSH SERPL DL<=0.005 MIU/L-ACNC: 1.5 UIU/ML (ref 0.3–4.2)
WBC # BLD AUTO: 9.3 10E3/UL (ref 4–11)

## 2025-03-06 RX ORDER — ROSUVASTATIN CALCIUM 20 MG/1
20 TABLET, COATED ORAL DAILY
Qty: 90 TABLET | Refills: 3 | Status: SHIPPED | OUTPATIENT
Start: 2025-03-06

## 2025-03-06 RX ORDER — METOPROLOL SUCCINATE 50 MG/1
50 TABLET, EXTENDED RELEASE ORAL DAILY
Qty: 90 TABLET | Refills: 3 | Status: SHIPPED | OUTPATIENT
Start: 2025-03-06

## 2025-03-06 RX ORDER — BUPROPION HYDROCHLORIDE 150 MG/1
150 TABLET ORAL EVERY MORNING
Qty: 30 TABLET | Refills: 0 | Status: SHIPPED | OUTPATIENT
Start: 2025-03-06

## 2025-03-06 RX ORDER — LOSARTAN POTASSIUM AND HYDROCHLOROTHIAZIDE 25; 100 MG/1; MG/1
1 TABLET ORAL DAILY
Qty: 90 TABLET | Refills: 3 | Status: SHIPPED | OUTPATIENT
Start: 2025-03-06

## 2025-03-06 RX ORDER — FLUTICASONE PROPIONATE 50 MCG
1 SPRAY, SUSPENSION (ML) NASAL DAILY
Qty: 16 G | Refills: 1 | Status: SHIPPED | OUTPATIENT
Start: 2025-03-06

## 2025-03-06 RX ORDER — BUPROPION HYDROCHLORIDE 300 MG/1
300 TABLET ORAL EVERY MORNING
Qty: 90 TABLET | Refills: 3 | Status: SHIPPED | OUTPATIENT
Start: 2025-03-27

## 2025-03-06 RX ORDER — ALBUTEROL SULFATE 90 UG/1
2 INHALANT RESPIRATORY (INHALATION) EVERY 4 HOURS PRN
Qty: 18 G | Refills: 3 | Status: SHIPPED | OUTPATIENT
Start: 2025-03-06

## 2025-03-06 SDOH — HEALTH STABILITY: PHYSICAL HEALTH: ON AVERAGE, HOW MANY DAYS PER WEEK DO YOU ENGAGE IN MODERATE TO STRENUOUS EXERCISE (LIKE A BRISK WALK)?: 3 DAYS

## 2025-03-06 ASSESSMENT — PATIENT HEALTH QUESTIONNAIRE - PHQ9
SUM OF ALL RESPONSES TO PHQ QUESTIONS 1-9: 6
SUM OF ALL RESPONSES TO PHQ QUESTIONS 1-9: 6
10. IF YOU CHECKED OFF ANY PROBLEMS, HOW DIFFICULT HAVE THESE PROBLEMS MADE IT FOR YOU TO DO YOUR WORK, TAKE CARE OF THINGS AT HOME, OR GET ALONG WITH OTHER PEOPLE: SOMEWHAT DIFFICULT

## 2025-03-06 ASSESSMENT — SOCIAL DETERMINANTS OF HEALTH (SDOH): HOW OFTEN DO YOU GET TOGETHER WITH FRIENDS OR RELATIVES?: TWICE A WEEK

## 2025-03-06 NOTE — PROGRESS NOTES
Preventive Care Visit  [facility]  Alisson HaddadREX CNP, Family Medicine  Mar 6, 2025      Assessment & Plan     Essential hypertension with goal blood pressure less than 140/90:  - Blood pressure was high during the visit. Patient has been on high blood pressure medication since age 18.  - Refill losartan hydrochlorothiazide. Recommend spot checking blood pressure at home. If consistently above 140/90, notify the office.    High triglycerides:  Coronary artery calcification:  - Check cholesterol levels as part of blood work.  - Statin reordered.    Screen for colon cancer:  - Send Cologuard test for at-home screening. If positive, a colonoscopy will be required.    Routine general medical examination at a health care facility:  - Conduct blood work including cholesterol, hemoglobin A1c, kidney function, liver function, electrolytes, and thyroid levels. Follow-up in three months.    Class 2 obesity without serious comorbidity with body mass index (BMI) of 36.0 to 36.9 in adult, unspecified obesity type:  - Difficulty with weight management despite diet and exercise.  - Start semaglutide (Wegovy) for weight management, pending insurance approval. Recommend dietary changes to eat more frequently and increase protein intake. Follow-up in three months.  - No absolute contraindications including a person or family history of multiple endocrine neoplasia or medullary thyroid cancer.   - Risks and side effects: Discussed potential side effects of semaglutide, including nausea, diarrhea, constipation, heartburn, and fatigue.    Acute bronchitis, unspecified organism:  - Refill albuterol inhaler for use as needed.    Allergic symptoms, subsequent encounter:  - Refill fluticasone.    Mild major depression:  - Wellbutrin helped with depressive symptoms.  - Refill Wellbutrin, starting with 150 mg for one month, then increase to 300 mg.    Elevated rheumatoid factor:  - History of elevated  "rheumatoid factor.  - Check inflammation markers as part of blood work.    Elevated C-reactive protein (CRP):  - History of elevated CRP.  - Check inflammation markers as part of blood work.    Pre-diabetes:  - Check hemoglobin A1c as part of blood work.  - Recommended continuing to work on therapeutic lifestyle changes including a heart healthy diet, regular physical activity and weight management/loss efforts.      Consent was obtained from the patient to use an AI documentation tool in the creation of this note.      BMI  Estimated body mass index is 36.69 kg/m  as calculated from the following:    Height as of this encounter: 1.651 m (5' 5\").    Weight as of this encounter: 100 kg (220 lb 8 oz).   Weight management plan: Discussed healthy diet and exercise guidelines    Counseling  Appropriate preventive services were addressed with this patient via screening, questionnaire, or discussion as appropriate for fall prevention, nutrition, physical activity, Tobacco-use cessation, social engagement, weight loss and cognition.  Checklist reviewing preventive services available has been given to the patient.  Reviewed patient's diet, addressing concerns and/or questions.   She is at risk for lack of exercise and has been provided with information to increase physical activity for the benefit of her well-being.   The patient was instructed to see the dentist every 6 months.   She is at risk for psychosocial distress and has been provided with information to reduce risk.   The patient's PHQ-9 score is consistent with mild depression. She was provided with information regarding depression.         Harley Rosas is a 59 year old, presenting for the following:  Wellness Visit and Hypertension        3/6/2025     8:17 AM   Additional Questions   Roomed by Miya DEVINE  - Alisonjerome Barfield, 59-year-old female, is here for an annual physical exam and follow-up on chronic medical conditions.  - She has a " history of high blood pressure since age 18, managed with medication, and reports headaches as a symptom of high blood pressure.  - She previously used Wellbutrin for slight depression and weight management, feeling it helped with depression but not weight management.  - She has difficulty losing weight despite diet and exercise efforts, possibly related to age and menopause.  - Nearly 15 years ago, she underwent a double mastectomy and full hysterectomy due to breast cancer and is concerned about weight management in preparation for potential surgery to address aging breast implants.  - She reports sinus infections a few times a year, which can lead to chest issues if not treated promptly.  - Previously, she had moderate plaque buildup in coronary arteries, leading to a recommendation for statin use.  - Her brother recently  from pancreatic cancer, but she has no personal history of pancreatitis.    Hypertension Follow-up    Do you check your blood pressure regularly outside of the clinic? No   Are you following a low salt diet? No  Are your blood pressures ever more than 140 on the top number (systolic) OR more   than 90 on the bottom number (diastolic), for example 140/90? N/A          3/6/2025   General Health   How would you rate your overall physical health? Good   Feel stress (tense, anxious, or unable to sleep) To some extent   (!) STRESS CONCERN      3/6/2025   Nutrition   Three or more servings of calcium each day? Yes   Diet: Regular (no restrictions)   How many servings of fruit and vegetables per day? (!) 2-3   How many sweetened beverages each day? 0-1         3/6/2025   Exercise   Days per week of moderate/strenous exercise 3 days         3/6/2025   Social Factors   Frequency of gathering with friends or relatives Twice a week   Worry food won't last until get money to buy more No   Food not last or not have enough money for food? No   Do you have housing? (Housing is defined as stable permanent  housing and does not include staying ouside in a car, in a tent, in an abandoned building, in an overnight shelter, or couch-surfing.) Yes   Are you worried about losing your housing? Patient declined   Lack of transportation? No   Unable to get utilities (heat,electricity)? No         3/6/2025   Fall Risk   Fallen 2 or more times in the past year? No   Trouble with walking or balance? No          3/6/2025   Dental   Dentist two times every year? (!) NO         2024   TB Screening   Were you born outside of the [place]? No       Today's PHQ-9 Score:       3/6/2025     8:15 AM   PHQ-9 SCORE   PHQ-9 Total Score MyChart 6 (Mild depression)   PHQ-9 Total Score 6        Patient-reported         3/6/2025   Substance Use   Alcohol more than 3/day or more than 7/wk No   Do you use any other substances recreationally? No     Social History     Tobacco Use    Smoking status: Former     Current packs/day: 0.00     Types: Cigarettes     Start date: 6/15/1982     Quit date: 1998     Years since quittin.8    Smokeless tobacco: Never    Tobacco comments:     Quit in    Vaping Use    Vaping status: Never Used   Substance Use Topics    Alcohol use: No     Comment: none    Drug use: No           2021   LAST FHS-7 RESULTS   1st degree relative breast or ovarian cancer Yes    Any relative bilateral breast cancer Unknown    Any male have breast cancer Yes    Any ONE woman have BOTH breast AND ovarian cancer Yes    Any woman with breast cancer before 50yrs Yes    2 or more relatives with breast AND/OR ovarian cancer Yes    2 or more relatives with breast AND/OR bowel cancer Yes        Proxy-reported        Mammogram Screening - Mammography discussed, not appropriate due to bilateral mastectomy.  Surgical history and/or SOGI form updated.        3/6/2025   STI Screening   New sexual partner(s) since last STI/HIV test? No     History of abnormal Pap smear: Status post hysterectomy with removal of cervix and no history of  "CIN2 or greater or cervical cancer. Health Maintenance and Surgical History updated.        9/14/2010    12:00 AM 3/24/2009    12:00 AM   PAP / HPV   PAP (Historical) OTHER-NIL EM>40  OTHER-NIL EM>40      ASCVD Risk   The 10-year ASCVD risk score (Lukas LEACH, et al., 2019) is: 5.7%    Values used to calculate the score:      Age: 59 years      Sex: Female      Is Non- : No      Diabetic: No      Tobacco smoker: No      Systolic Blood Pressure: 138 mmHg      Is BP treated: Yes      HDL Cholesterol: 53 mg/dL      Total Cholesterol: 249 mg/dL         Reviewed and updated as needed this visit by Provider   Tobacco  Allergies  Meds  Problems  Med Hx  Surg Hx  Fam Hx             Objective    Exam  /78   Pulse 85   Temp 98.3  F (36.8  C) (Oral)   Resp 14   Ht 1.651 m (5' 5\")   Wt 100 kg (220 lb 8 oz)   LMP 09/09/2010   SpO2 97%   BMI 36.69 kg/m     Estimated body mass index is 36.69 kg/m  as calculated from the following:    Height as of this encounter: 1.651 m (5' 5\").    Weight as of this encounter: 100 kg (220 lb 8 oz).    Physical Exam  GENERAL: alert and no distress  RESP: lungs clear to auscultation - no rales, rhonchi or wheezes  CV: regular rate and rhythm, normal S1 S2, no S3 or S4, no murmur, click or rub, no peripheral edema        Signed Electronically by: REX Keen CNP    Answers submitted by the patient for this visit:  Patient Health Questionnaire (Submitted on 3/6/2025)  If you checked off any problems, how difficult have these problems made it for you to do your work, take care of things at home, or get along with other people?: Somewhat difficult  PHQ9 TOTAL SCORE: 6    "

## 2025-03-10 ENCOUNTER — MYC MEDICAL ADVICE (OUTPATIENT)
Dept: FAMILY MEDICINE | Facility: CLINIC | Age: 60
End: 2025-03-10
Payer: COMMERCIAL

## 2025-03-10 NOTE — TELEPHONE ENCOUNTER
See my chart message    Replied via Zachary Sutton RN BSN  Clinic Nurse  [facility]

## 2025-03-10 NOTE — TELEPHONE ENCOUNTER
Please let patient know that this is likely an immune response from the vaccines and likely not worrisome. Please let her know that I would like for her to be evaluated if not improving after a week. Thanks.

## 2025-04-02 ENCOUNTER — MYC MEDICAL ADVICE (OUTPATIENT)
Dept: FAMILY MEDICINE | Facility: CLINIC | Age: 60
End: 2025-04-02
Payer: COMMERCIAL

## 2025-04-02 ENCOUNTER — MYC REFILL (OUTPATIENT)
Dept: FAMILY MEDICINE | Facility: CLINIC | Age: 60
End: 2025-04-02
Payer: COMMERCIAL

## 2025-04-02 DIAGNOSIS — E66.812 CLASS 2 OBESITY WITHOUT SERIOUS COMORBIDITY WITH BODY MASS INDEX (BMI) OF 36.0 TO 36.9 IN ADULT, UNSPECIFIED OBESITY TYPE: Primary | ICD-10-CM

## 2025-04-02 DIAGNOSIS — I10 ESSENTIAL HYPERTENSION WITH GOAL BLOOD PRESSURE LESS THAN 140/90: ICD-10-CM

## 2025-04-02 DIAGNOSIS — E78.1 HIGH TRIGLYCERIDES: ICD-10-CM

## 2025-04-02 DIAGNOSIS — E66.812 CLASS 2 OBESITY WITHOUT SERIOUS COMORBIDITY WITH BODY MASS INDEX (BMI) OF 36.0 TO 36.9 IN ADULT, UNSPECIFIED OBESITY TYPE: ICD-10-CM

## 2025-04-02 NOTE — TELEPHONE ENCOUNTER
Alisson is out of office.  Is patient wanting to increase dose to 0.5 mg since she is tolerating 0.25 mg dose?    REX Garcia CNP

## 2025-04-02 NOTE — TELEPHONE ENCOUNTER
REX Garcia CNP/covering providers    Patient does not wish to increase semaglutide dose at this time.     RN called patient and she would like to remain at 0.25 MG dose for one more month prior to increasing to 0.5 MG dose.   -Patient restarted a few medications and wants to give it a little more time prior to making chages. -Patient will send an update in one month prior to next refill.     semaglutide-weight management (WEGOVY) 0.25 MG/0.5ML pen Inject 0.5 mLs (0.25 mg) subcutaneously once a week.     -Patient asked if buproprion dose would increase from 150 to 300 mg. RN informed of visit note below:   Mild major depression:  - Wellbutrin helped with depressive symptoms.  - Refill Wellbutrin, starting with 150 mg for one month, then increase to 300 mg.  RN informed prescription sent 3/27/25  -Patient verbalized understanding and agreeable to plan.     RN scheduled patient for follow-up from visit on 3/6/25 as recommended.     Future Appointments 4/2/2025 - 9/29/2025        Date Visit Type Length Department Provider     6/6/2025 10:00 AM OFFICE VISIT 30 min CR FP/IM/Alisson Ponce APRN CNP    Location Instructions:     [facility] is located at [address] next to [employer] [address]. Free parking is available; access the lot by turning east from [facility] onto [address] [place] [address].                     SAL Guardado, RN  [facility]

## 2025-05-07 ENCOUNTER — MYC REFILL (OUTPATIENT)
Dept: FAMILY MEDICINE | Facility: CLINIC | Age: 60
End: 2025-05-07
Payer: COMMERCIAL

## 2025-05-07 DIAGNOSIS — I10 ESSENTIAL HYPERTENSION WITH GOAL BLOOD PRESSURE LESS THAN 140/90: ICD-10-CM

## 2025-05-07 DIAGNOSIS — E66.812 CLASS 2 OBESITY WITHOUT SERIOUS COMORBIDITY WITH BODY MASS INDEX (BMI) OF 36.0 TO 36.9 IN ADULT, UNSPECIFIED OBESITY TYPE: ICD-10-CM

## 2025-05-07 DIAGNOSIS — E78.1 HIGH TRIGLYCERIDES: ICD-10-CM

## 2025-06-06 PROBLEM — K21.9 GASTROESOPHAGEAL REFLUX DISEASE WITHOUT ESOPHAGITIS: Status: ACTIVE | Noted: 2025-06-06

## 2025-06-30 ENCOUNTER — TELEPHONE (OUTPATIENT)
Dept: FAMILY MEDICINE | Facility: CLINIC | Age: 60
End: 2025-06-30
Payer: COMMERCIAL

## 2025-06-30 NOTE — TELEPHONE ENCOUNTER
Patient Quality Outreach    Patient is due for the following:   Colon Cancer Screening    Action(s) Taken:   Contacted patient to remind them to schedule their Colon Cancer screening     Type of outreach:    Sent Optim[employer] message.    Questions for provider review:    None         Lauren Lopez CMA  Chart routed to None.

## 2025-07-08 DIAGNOSIS — T78.40XD ALLERGIC SYMPTOMS, SUBSEQUENT ENCOUNTER: ICD-10-CM

## 2025-07-08 RX ORDER — FLUTICASONE PROPIONATE 50 MCG
1 SPRAY, SUSPENSION (ML) NASAL DAILY
Qty: 16 G | Refills: 1 | Status: SHIPPED | OUTPATIENT
Start: 2025-07-08

## 2025-07-28 ENCOUNTER — MYC REFILL (OUTPATIENT)
Dept: FAMILY MEDICINE | Facility: CLINIC | Age: 60
End: 2025-07-28
Payer: COMMERCIAL

## 2025-07-28 DIAGNOSIS — I10 ESSENTIAL HYPERTENSION WITH GOAL BLOOD PRESSURE LESS THAN 140/90: ICD-10-CM

## 2025-07-28 RX ORDER — LOSARTAN POTASSIUM AND HYDROCHLOROTHIAZIDE 25; 100 MG/1; MG/1
1 TABLET ORAL DAILY
Qty: 90 TABLET | Refills: 1 | Status: SHIPPED | OUTPATIENT
Start: 2025-07-28